# Patient Record
Sex: FEMALE | Race: WHITE | Employment: OTHER
[De-identification: names, ages, dates, MRNs, and addresses within clinical notes are randomized per-mention and may not be internally consistent; named-entity substitution may affect disease eponyms.]

---

## 2017-09-05 PROBLEM — G89.4 CHRONIC PAIN SYNDROME: Status: ACTIVE | Noted: 2017-09-05

## 2017-09-11 PROBLEM — M71.9 BURSITIS: Chronic | Status: ACTIVE | Noted: 2017-09-11

## 2017-09-25 ENCOUNTER — TELEPHONE (OUTPATIENT)
Dept: CASE MANAGEMENT | Age: 57
End: 2017-09-25

## 2017-10-10 PROBLEM — Z15.89 HLA B27 (HLA B27 POSITIVE): Status: ACTIVE | Noted: 2017-10-10

## 2018-03-06 PROBLEM — D72.820 LYMPHOCYTOSIS: Status: ACTIVE | Noted: 2018-03-06

## 2018-03-15 ENCOUNTER — TELEPHONE (OUTPATIENT)
Dept: ENT CLINIC | Age: 58
End: 2018-03-15

## 2018-03-15 NOTE — TELEPHONE ENCOUNTER
Called pt from referral queue to schedule with Dr. Rosalina Jarvis. Patient is a hem onc patient. I left a message for her to call us back to be scheduled. If you can call her to schedule her asap please call her back. Thank you.

## 2018-03-20 ENCOUNTER — TELEPHONE (OUTPATIENT)
Dept: ENT CLINIC | Age: 58
End: 2018-03-20

## 2018-04-03 ENCOUNTER — OFFICE VISIT (OUTPATIENT)
Dept: ENT CLINIC | Age: 58
End: 2018-04-03
Payer: MEDICAID

## 2018-04-03 VITALS
WEIGHT: 154.5 LBS | HEIGHT: 57 IN | HEART RATE: 53 BPM | DIASTOLIC BLOOD PRESSURE: 83 MMHG | SYSTOLIC BLOOD PRESSURE: 141 MMHG | BODY MASS INDEX: 33.33 KG/M2

## 2018-04-03 DIAGNOSIS — K21.9 LPRD (LARYNGOPHARYNGEAL REFLUX DISEASE): ICD-10-CM

## 2018-04-03 DIAGNOSIS — E04.1 THYROID NODULE: Primary | ICD-10-CM

## 2018-04-03 DIAGNOSIS — R49.0 HOARSE: ICD-10-CM

## 2018-04-03 PROCEDURE — G8417 CALC BMI ABV UP PARAM F/U: HCPCS | Performed by: OTOLARYNGOLOGY

## 2018-04-03 PROCEDURE — G8427 DOCREV CUR MEDS BY ELIG CLIN: HCPCS | Performed by: OTOLARYNGOLOGY

## 2018-04-03 PROCEDURE — 4004F PT TOBACCO SCREEN RCVD TLK: CPT | Performed by: OTOLARYNGOLOGY

## 2018-04-03 PROCEDURE — 99204 OFFICE O/P NEW MOD 45 MIN: CPT | Performed by: OTOLARYNGOLOGY

## 2018-04-03 PROCEDURE — 3017F COLORECTAL CA SCREEN DOC REV: CPT | Performed by: OTOLARYNGOLOGY

## 2018-04-03 PROCEDURE — 3014F SCREEN MAMMO DOC REV: CPT | Performed by: OTOLARYNGOLOGY

## 2018-04-03 RX ORDER — AZELASTINE 1 MG/ML
2 SPRAY, METERED NASAL 2 TIMES DAILY
Qty: 1 BOTTLE | Refills: 3 | Status: SHIPPED | OUTPATIENT
Start: 2018-04-03 | End: 2018-05-16 | Stop reason: SDUPTHER

## 2018-04-03 RX ORDER — OMEPRAZOLE 40 MG/1
40 CAPSULE, DELAYED RELEASE ORAL DAILY
Qty: 30 CAPSULE | Refills: 3 | Status: SHIPPED | OUTPATIENT
Start: 2018-04-03 | End: 2018-05-16 | Stop reason: SDUPTHER

## 2018-04-05 ENCOUNTER — TELEPHONE (OUTPATIENT)
Dept: FAMILY MEDICINE CLINIC | Age: 58
End: 2018-04-05

## 2018-04-05 PROBLEM — G47.33 OSA (OBSTRUCTIVE SLEEP APNEA): Status: ACTIVE | Noted: 2018-04-05

## 2018-04-06 ENCOUNTER — TELEPHONE (OUTPATIENT)
Dept: ENT CLINIC | Age: 58
End: 2018-04-06

## 2018-04-06 DIAGNOSIS — E04.1 LEFT THYROID NODULE: Primary | ICD-10-CM

## 2018-04-14 ASSESSMENT — ENCOUNTER SYMPTOMS
BLURRED VISION: 0
SHORTNESS OF BREATH: 0
DOUBLE VISION: 0
COUGH: 0
VOMITING: 0
HEARTBURN: 0

## 2018-04-20 ENCOUNTER — HOSPITAL ENCOUNTER (OUTPATIENT)
Dept: ULTRASOUND IMAGING | Age: 58
Discharge: HOME | End: 2018-04-22
Payer: MEDICAID

## 2018-04-20 DIAGNOSIS — E04.1 THYROID NODULE: ICD-10-CM

## 2018-04-20 DIAGNOSIS — E04.1 LEFT THYROID NODULE: ICD-10-CM

## 2018-04-20 PROCEDURE — 88173 CYTOPATH EVAL FNA REPORT: CPT

## 2018-04-20 PROCEDURE — 76942 ECHO GUIDE FOR BIOPSY: CPT

## 2018-04-20 PROCEDURE — 88305 TISSUE EXAM BY PATHOLOGIST: CPT

## 2018-04-20 PROCEDURE — 10022 US FINE NEEDLE ASPIRATION: CPT

## 2018-05-01 ENCOUNTER — TELEPHONE (OUTPATIENT)
Dept: ADMINISTRATIVE | Age: 58
End: 2018-05-01

## 2018-05-03 DIAGNOSIS — Z12.39 SCREENING FOR BREAST CANCER: Primary | ICD-10-CM

## 2018-05-15 ENCOUNTER — OFFICE VISIT (OUTPATIENT)
Dept: FAMILY MEDICINE CLINIC | Age: 58
End: 2018-05-15
Payer: MEDICAID

## 2018-05-15 ENCOUNTER — HOSPITAL ENCOUNTER (OUTPATIENT)
Age: 58
Discharge: HOME | End: 2018-05-17
Payer: MEDICAID

## 2018-05-15 VITALS
BODY MASS INDEX: 36.29 KG/M2 | TEMPERATURE: 97.9 F | HEIGHT: 59 IN | WEIGHT: 180 LBS | DIASTOLIC BLOOD PRESSURE: 80 MMHG | SYSTOLIC BLOOD PRESSURE: 118 MMHG | OXYGEN SATURATION: 94 % | HEART RATE: 56 BPM

## 2018-05-15 DIAGNOSIS — R63.5 RAPID WEIGHT GAIN: ICD-10-CM

## 2018-05-15 DIAGNOSIS — I10 ESSENTIAL HYPERTENSION: Primary | ICD-10-CM

## 2018-05-15 DIAGNOSIS — R49.0 HOARSENESS: ICD-10-CM

## 2018-05-15 DIAGNOSIS — Z15.89 HLA B27 (HLA B27 POSITIVE): ICD-10-CM

## 2018-05-15 LAB — TSH SERPL DL<=0.05 MIU/L-ACNC: 2.56 UIU/ML (ref 0.27–4.2)

## 2018-05-15 PROCEDURE — 84443 ASSAY THYROID STIM HORMONE: CPT

## 2018-05-15 PROCEDURE — 99214 OFFICE O/P EST MOD 30 MIN: CPT | Performed by: FAMILY MEDICINE

## 2018-05-15 PROCEDURE — 36415 COLL VENOUS BLD VENIPUNCTURE: CPT | Performed by: FAMILY MEDICINE

## 2018-05-15 PROCEDURE — 3017F COLORECTAL CA SCREEN DOC REV: CPT | Performed by: FAMILY MEDICINE

## 2018-05-15 PROCEDURE — G8427 DOCREV CUR MEDS BY ELIG CLIN: HCPCS | Performed by: FAMILY MEDICINE

## 2018-05-15 PROCEDURE — 4004F PT TOBACCO SCREEN RCVD TLK: CPT | Performed by: FAMILY MEDICINE

## 2018-05-15 PROCEDURE — G8417 CALC BMI ABV UP PARAM F/U: HCPCS | Performed by: FAMILY MEDICINE

## 2018-05-15 RX ORDER — CHLORTHALIDONE 25 MG/1
25 TABLET ORAL DAILY
Qty: 30 TABLET | Refills: 2 | Status: SHIPPED | OUTPATIENT
Start: 2018-05-15 | End: 2018-07-24 | Stop reason: SDUPTHER

## 2018-05-15 RX ORDER — MIRTAZAPINE 30 MG/1
30 TABLET, FILM COATED ORAL NIGHTLY
COMMUNITY
Start: 2018-05-15 | End: 2018-11-01 | Stop reason: SDUPTHER

## 2018-05-15 RX ORDER — LOSARTAN POTASSIUM 50 MG/1
50 TABLET ORAL DAILY
Qty: 30 TABLET | Refills: 2 | Status: SHIPPED | OUTPATIENT
Start: 2018-05-15 | End: 2018-07-15 | Stop reason: SDUPTHER

## 2018-05-16 ENCOUNTER — OFFICE VISIT (OUTPATIENT)
Dept: ENT CLINIC | Age: 58
End: 2018-05-16
Payer: MEDICAID

## 2018-05-16 VITALS
HEIGHT: 57 IN | DIASTOLIC BLOOD PRESSURE: 78 MMHG | SYSTOLIC BLOOD PRESSURE: 139 MMHG | WEIGHT: 182.2 LBS | BODY MASS INDEX: 39.31 KG/M2 | HEART RATE: 55 BPM

## 2018-05-16 DIAGNOSIS — E04.1 LEFT THYROID NODULE: Primary | ICD-10-CM

## 2018-05-16 DIAGNOSIS — J38.1 REINKE'S EDEMA OF VOCAL FOLDS: ICD-10-CM

## 2018-05-16 DIAGNOSIS — R49.0 HOARSE: ICD-10-CM

## 2018-05-16 PROCEDURE — 3017F COLORECTAL CA SCREEN DOC REV: CPT | Performed by: OTOLARYNGOLOGY

## 2018-05-16 PROCEDURE — 4004F PT TOBACCO SCREEN RCVD TLK: CPT | Performed by: OTOLARYNGOLOGY

## 2018-05-16 PROCEDURE — G8417 CALC BMI ABV UP PARAM F/U: HCPCS | Performed by: OTOLARYNGOLOGY

## 2018-05-16 PROCEDURE — G8427 DOCREV CUR MEDS BY ELIG CLIN: HCPCS | Performed by: OTOLARYNGOLOGY

## 2018-05-16 PROCEDURE — 31575 DIAGNOSTIC LARYNGOSCOPY: CPT | Performed by: OTOLARYNGOLOGY

## 2018-05-16 PROCEDURE — 99214 OFFICE O/P EST MOD 30 MIN: CPT | Performed by: OTOLARYNGOLOGY

## 2018-05-16 RX ORDER — AZELASTINE 1 MG/ML
2 SPRAY, METERED NASAL 2 TIMES DAILY
Qty: 1 BOTTLE | Refills: 3 | Status: SHIPPED | OUTPATIENT
Start: 2018-05-16 | End: 2018-08-30 | Stop reason: SDUPTHER

## 2018-05-16 RX ORDER — OMEPRAZOLE 40 MG/1
40 CAPSULE, DELAYED RELEASE ORAL DAILY
Qty: 30 CAPSULE | Refills: 3 | Status: SHIPPED | OUTPATIENT
Start: 2018-05-16 | End: 2018-08-30 | Stop reason: SDUPTHER

## 2018-05-16 ASSESSMENT — ENCOUNTER SYMPTOMS
EYES NEGATIVE: 1
ALLERGIC/IMMUNOLOGIC NEGATIVE: 1
GASTROINTESTINAL NEGATIVE: 1
SORE THROAT: 1
TROUBLE SWALLOWING: 0
RESPIRATORY NEGATIVE: 1
VOICE CHANGE: 1

## 2018-06-05 LAB
ALBUMIN SERPL-MCNC: NORMAL G/DL
ALP BLD-CCNC: NORMAL U/L
ALT SERPL-CCNC: NORMAL U/L
ANION GAP SERPL CALCULATED.3IONS-SCNC: NORMAL MMOL/L
AST SERPL-CCNC: NORMAL U/L
BILIRUB SERPL-MCNC: NORMAL MG/DL (ref 0.1–1.4)
BUN BLDV-MCNC: 12 MG/DL
CALCIUM SERPL-MCNC: 9.6 MG/DL
CHLORIDE BLD-SCNC: 97 MMOL/L
CO2: 31 MMOL/L
CREAT SERPL-MCNC: 0.84 MG/DL
GFR CALCULATED: >60
GLUCOSE BLD-MCNC: 86 MG/DL
POTASSIUM SERPL-SCNC: 4.2 MMOL/L
SODIUM BLD-SCNC: 137 MMOL/L
TOTAL PROTEIN: NORMAL

## 2018-07-15 DIAGNOSIS — I10 ESSENTIAL HYPERTENSION: ICD-10-CM

## 2018-07-17 RX ORDER — LOSARTAN POTASSIUM 50 MG/1
50 TABLET ORAL DAILY
Qty: 30 TABLET | Refills: 0 | Status: SHIPPED | OUTPATIENT
Start: 2018-07-17 | End: 2018-08-16

## 2018-07-23 DIAGNOSIS — I10 ESSENTIAL HYPERTENSION: ICD-10-CM

## 2018-07-23 NOTE — TELEPHONE ENCOUNTER
Requested Prescriptions     Pending Prescriptions Disp Refills    metoprolol succinate (TOPROL XL) 100 MG extended release tablet 30 tablet 2     Sig: TAKE 1 TABLET BY MOUTH DAILY       Next appt is Visit date not found  Last appt was 5/15/2018

## 2018-07-24 DIAGNOSIS — K21.9 GASTROESOPHAGEAL REFLUX DISEASE, ESOPHAGITIS PRESENCE NOT SPECIFIED: ICD-10-CM

## 2018-07-24 DIAGNOSIS — R63.5 RAPID WEIGHT GAIN: ICD-10-CM

## 2018-07-24 DIAGNOSIS — I10 ESSENTIAL HYPERTENSION: ICD-10-CM

## 2018-07-24 RX ORDER — RANITIDINE 150 MG/1
TABLET ORAL
Qty: 60 TABLET | Refills: 2 | Status: SHIPPED | OUTPATIENT
Start: 2018-07-24 | End: 2018-08-30 | Stop reason: SDUPTHER

## 2018-07-24 RX ORDER — METOPROLOL SUCCINATE 100 MG/1
TABLET, EXTENDED RELEASE ORAL
Qty: 30 TABLET | Refills: 2 | Status: SHIPPED | OUTPATIENT
Start: 2018-07-24 | End: 2018-08-30 | Stop reason: SDUPTHER

## 2018-07-24 RX ORDER — CHLORTHALIDONE 25 MG/1
25 TABLET ORAL DAILY
Qty: 30 TABLET | Refills: 2 | Status: SHIPPED | OUTPATIENT
Start: 2018-07-24 | End: 2018-11-01 | Stop reason: SDUPTHER

## 2018-08-14 DIAGNOSIS — I10 ESSENTIAL HYPERTENSION: ICD-10-CM

## 2018-08-16 RX ORDER — LOSARTAN POTASSIUM 50 MG/1
50 TABLET ORAL DAILY
Qty: 30 TABLET | Refills: 0 | Status: SHIPPED | OUTPATIENT
Start: 2018-08-16 | End: 2018-08-30 | Stop reason: SDUPTHER

## 2018-08-30 ENCOUNTER — OFFICE VISIT (OUTPATIENT)
Dept: FAMILY MEDICINE CLINIC | Age: 58
End: 2018-08-30
Payer: MEDICAID

## 2018-08-30 ENCOUNTER — HOSPITAL ENCOUNTER (OUTPATIENT)
Age: 58
Discharge: HOME | End: 2018-09-01
Payer: MEDICAID

## 2018-08-30 VITALS
HEART RATE: 80 BPM | SYSTOLIC BLOOD PRESSURE: 128 MMHG | HEIGHT: 59 IN | TEMPERATURE: 98.4 F | BODY MASS INDEX: 36.49 KG/M2 | DIASTOLIC BLOOD PRESSURE: 80 MMHG | WEIGHT: 181 LBS | OXYGEN SATURATION: 94 %

## 2018-08-30 DIAGNOSIS — R23.2 VASOMOTOR FLUSHING: ICD-10-CM

## 2018-08-30 DIAGNOSIS — R23.2 VASOMOTOR FLUSHING: Primary | ICD-10-CM

## 2018-08-30 DIAGNOSIS — R30.0 DYSURIA: ICD-10-CM

## 2018-08-30 DIAGNOSIS — K21.9 GASTROESOPHAGEAL REFLUX DISEASE, ESOPHAGITIS PRESENCE NOT SPECIFIED: ICD-10-CM

## 2018-08-30 DIAGNOSIS — Z12.39 SCREENING FOR BREAST CANCER: ICD-10-CM

## 2018-08-30 DIAGNOSIS — I10 ESSENTIAL HYPERTENSION: ICD-10-CM

## 2018-08-30 LAB
ALBUMIN SERPL-MCNC: 4 G/DL (ref 3.5–5.2)
ALP BLD-CCNC: 79 U/L (ref 35–104)
ALT SERPL-CCNC: 18 U/L (ref 0–32)
ANION GAP SERPL CALCULATED.3IONS-SCNC: 17 MMOL/L (ref 7–16)
AST SERPL-CCNC: 24 U/L (ref 0–31)
BASOPHILS ABSOLUTE: 0.09 E9/L (ref 0–0.2)
BASOPHILS RELATIVE PERCENT: 0.9 % (ref 0–2)
BILIRUB SERPL-MCNC: 0.3 MG/DL (ref 0–1.2)
BILIRUBIN URINE: NEGATIVE
BILIRUBIN, POC: NORMAL
BLOOD URINE, POC: NORMAL
BLOOD, URINE: NEGATIVE
BUN BLDV-MCNC: 19 MG/DL (ref 6–20)
CALCIUM SERPL-MCNC: 9.2 MG/DL (ref 8.6–10.2)
CHLORIDE BLD-SCNC: 98 MMOL/L (ref 98–107)
CLARITY, POC: CLEAR
CLARITY: CLEAR
CO2: 26 MMOL/L (ref 22–29)
COLOR, POC: YELLOW
COLOR: YELLOW
CREAT SERPL-MCNC: 0.9 MG/DL (ref 0.5–1)
EOSINOPHILS ABSOLUTE: 0.31 E9/L (ref 0.05–0.5)
EOSINOPHILS RELATIVE PERCENT: 3 % (ref 0–6)
FOLLICLE STIMULATING HORMONE: 82.8 MIU/ML
GFR AFRICAN AMERICAN: >60
GFR NON-AFRICAN AMERICAN: >60 ML/MIN/1.73
GLUCOSE BLD-MCNC: 96 MG/DL (ref 74–109)
GLUCOSE URINE, POC: NORMAL
GLUCOSE URINE: NEGATIVE MG/DL
HCT VFR BLD CALC: 48.8 % (ref 34–48)
HEMOGLOBIN: 15.7 G/DL (ref 11.5–15.5)
IMMATURE GRANULOCYTES #: 0.04 E9/L
IMMATURE GRANULOCYTES %: 0.4 % (ref 0–5)
KETONES, POC: NORMAL
KETONES, URINE: NEGATIVE MG/DL
LEUKOCYTE EST, POC: NORMAL
LEUKOCYTE ESTERASE, URINE: NEGATIVE
LUTEINIZING HORMONE: 36.6 MIU/ML
LYMPHOCYTES ABSOLUTE: 2.91 E9/L (ref 1.5–4)
LYMPHOCYTES RELATIVE PERCENT: 27.7 % (ref 20–42)
MCH RBC QN AUTO: 30.5 PG (ref 26–35)
MCHC RBC AUTO-ENTMCNC: 32.2 % (ref 32–34.5)
MCV RBC AUTO: 94.8 FL (ref 80–99.9)
MONOCYTES ABSOLUTE: 1.04 E9/L (ref 0.1–0.95)
MONOCYTES RELATIVE PERCENT: 9.9 % (ref 2–12)
NEUTROPHILS ABSOLUTE: 6.11 E9/L (ref 1.8–7.3)
NEUTROPHILS RELATIVE PERCENT: 58.1 % (ref 43–80)
NITRITE, POC: NORMAL
NITRITE, URINE: NEGATIVE
PDW BLD-RTO: 13.9 FL (ref 11.5–15)
PH UA: 5 (ref 5–9)
PH, POC: 5
PLATELET # BLD: 290 E9/L (ref 130–450)
PMV BLD AUTO: 11.6 FL (ref 7–12)
POTASSIUM SERPL-SCNC: 4 MMOL/L (ref 3.5–5)
PROTEIN UA: NEGATIVE MG/DL
PROTEIN, POC: NORMAL
RBC # BLD: 5.15 E12/L (ref 3.5–5.5)
SODIUM BLD-SCNC: 141 MMOL/L (ref 132–146)
SPECIFIC GRAVITY UA: >=1.03 (ref 1–1.03)
SPECIFIC GRAVITY, POC: 1.03
T4 FREE: 0.96 NG/DL (ref 0.93–1.7)
TOTAL PROTEIN: 7.1 G/DL (ref 6.4–8.3)
TSH SERPL DL<=0.05 MIU/L-ACNC: 3.32 UIU/ML (ref 0.27–4.2)
UROBILINOGEN, POC: 0.2
UROBILINOGEN, URINE: 0.2 E.U./DL
WBC # BLD: 10.5 E9/L (ref 4.5–11.5)

## 2018-08-30 PROCEDURE — 3017F COLORECTAL CA SCREEN DOC REV: CPT | Performed by: FAMILY MEDICINE

## 2018-08-30 PROCEDURE — 81002 URINALYSIS NONAUTO W/O SCOPE: CPT | Performed by: FAMILY MEDICINE

## 2018-08-30 PROCEDURE — 87088 URINE BACTERIA CULTURE: CPT

## 2018-08-30 PROCEDURE — 84443 ASSAY THYROID STIM HORMONE: CPT

## 2018-08-30 PROCEDURE — 4004F PT TOBACCO SCREEN RCVD TLK: CPT | Performed by: FAMILY MEDICINE

## 2018-08-30 PROCEDURE — 81003 URINALYSIS AUTO W/O SCOPE: CPT

## 2018-08-30 PROCEDURE — 85025 COMPLETE CBC W/AUTO DIFF WBC: CPT

## 2018-08-30 PROCEDURE — 83001 ASSAY OF GONADOTROPIN (FSH): CPT

## 2018-08-30 PROCEDURE — 36415 COLL VENOUS BLD VENIPUNCTURE: CPT | Performed by: FAMILY MEDICINE

## 2018-08-30 PROCEDURE — G8417 CALC BMI ABV UP PARAM F/U: HCPCS | Performed by: FAMILY MEDICINE

## 2018-08-30 PROCEDURE — 80053 COMPREHEN METABOLIC PANEL: CPT

## 2018-08-30 PROCEDURE — 99214 OFFICE O/P EST MOD 30 MIN: CPT | Performed by: FAMILY MEDICINE

## 2018-08-30 PROCEDURE — G8428 CUR MEDS NOT DOCUMENT: HCPCS | Performed by: FAMILY MEDICINE

## 2018-08-30 PROCEDURE — 84439 ASSAY OF FREE THYROXINE: CPT

## 2018-08-30 PROCEDURE — 83002 ASSAY OF GONADOTROPIN (LH): CPT

## 2018-08-30 RX ORDER — OMEPRAZOLE 20 MG/1
20 CAPSULE, DELAYED RELEASE ORAL DAILY PRN
Qty: 30 CAPSULE | Refills: 2 | Status: SHIPPED | OUTPATIENT
Start: 2018-08-30 | End: 2018-11-01 | Stop reason: SDUPTHER

## 2018-08-30 RX ORDER — MELOXICAM 15 MG/1
TABLET ORAL
Qty: 30 TABLET | Refills: 3 | Status: SHIPPED | OUTPATIENT
Start: 2018-08-30 | End: 2018-11-01

## 2018-08-30 RX ORDER — VARENICLINE TARTRATE 1 MG/1
1 TABLET, FILM COATED ORAL 2 TIMES DAILY
Qty: 60 TABLET | Refills: 2 | Status: SHIPPED | OUTPATIENT
Start: 2018-08-30 | End: 2019-05-30 | Stop reason: SDUPTHER

## 2018-08-30 RX ORDER — METOPROLOL SUCCINATE 100 MG/1
TABLET, EXTENDED RELEASE ORAL
Qty: 30 TABLET | Refills: 2 | Status: SHIPPED | OUTPATIENT
Start: 2018-08-30 | End: 2018-11-01 | Stop reason: SDUPTHER

## 2018-08-30 RX ORDER — AZELASTINE 1 MG/ML
2 SPRAY, METERED NASAL 2 TIMES DAILY
Qty: 1 BOTTLE | Refills: 3 | Status: SHIPPED | OUTPATIENT
Start: 2018-08-30 | End: 2019-07-02 | Stop reason: SDUPTHER

## 2018-08-30 RX ORDER — CLONIDINE HYDROCHLORIDE 0.1 MG/1
0.1 TABLET ORAL 3 TIMES DAILY
Qty: 90 TABLET | Refills: 2 | Status: ON HOLD | OUTPATIENT
Start: 2018-08-30 | End: 2018-09-25 | Stop reason: HOSPADM

## 2018-08-30 RX ORDER — FLUTICASONE PROPIONATE 50 MCG
2 SPRAY, SUSPENSION (ML) NASAL DAILY
Qty: 1 BOTTLE | Refills: 3 | Status: SHIPPED | OUTPATIENT
Start: 2018-08-30 | End: 2018-09-22 | Stop reason: ALTCHOICE

## 2018-08-30 RX ORDER — LOSARTAN POTASSIUM 50 MG/1
50 TABLET ORAL DAILY
Qty: 30 TABLET | Refills: 0 | Status: SHIPPED | OUTPATIENT
Start: 2018-08-30 | End: 2018-10-09 | Stop reason: SDUPTHER

## 2018-08-30 RX ORDER — ACETAMINOPHEN 160 MG
2000 TABLET,DISINTEGRATING ORAL DAILY
Qty: 30 CAPSULE | Refills: 5 | Status: SHIPPED | OUTPATIENT
Start: 2018-08-30 | End: 2018-11-01 | Stop reason: SDUPTHER

## 2018-08-30 RX ORDER — RANITIDINE 150 MG/1
TABLET ORAL
Qty: 60 TABLET | Refills: 2 | Status: ON HOLD | OUTPATIENT
Start: 2018-08-30 | End: 2018-09-25 | Stop reason: HOSPADM

## 2018-08-30 ASSESSMENT — PATIENT HEALTH QUESTIONNAIRE - PHQ9
1. LITTLE INTEREST OR PLEASURE IN DOING THINGS: 1
SUM OF ALL RESPONSES TO PHQ QUESTIONS 1-9: 1
SUM OF ALL RESPONSES TO PHQ9 QUESTIONS 1 & 2: 1
SUM OF ALL RESPONSES TO PHQ QUESTIONS 1-9: 1
2. FEELING DOWN, DEPRESSED OR HOPELESS: 0

## 2018-08-30 ASSESSMENT — ENCOUNTER SYMPTOMS
WHEEZING: 0
COUGH: 0

## 2018-08-30 NOTE — PROGRESS NOTES
NERVE BLOCK Right 09/11/2017    hip #2    OTHER SURGICAL HISTORY Right 2 9 15    lumbar radiofrequency    OTHER SURGICAL HISTORY Right 3/9/16    lumbar radiofrequency    OTHER SURGICAL HISTORY Right 11/14/2016    right hip injection #1    OTHER SURGICAL HISTORY Left 12/19/2016    genicular radiofrequency    ROTATOR CUFF REPAIR  1999 at 42 years    Dr Akbar Abebe Left 7/22/14 at 48years old    Dr. Loraine Boggs  8/20/12       Past Family Hx:  Reviewed with patient  Family History   Problem Relation Age of Onset    Arthritis Mother     High Blood Pressure Mother     Rheum Arthritis Mother     High Blood Pressure Father     Other Father         emphysema    High Blood Pressure Brother     Depression Daughter     High Blood Pressure Brother        Social Hx:  Reviewed with patient  Social History   Substance Use Topics    Smoking status: Current Every Day Smoker     Packs/day: 1.50     Years: 40.00     Types: Cigarettes    Smokeless tobacco: Never Used      Comment: started at age 21-quit three times previously    Alcohol use No       Immunization History   Administered Date(s) Administered    Influenza Virus Vaccine 10/28/2013, 11/11/2015    Influenza, High Dose (Fluzone 65 yrs and older) 10/18/2017    Influenza, Gab Marte, 3 Years and older, IM (Fluzone 3 yrs and older or Afluria 5 yrs and older) 11/04/2016    Influenza, Gab Marte, 3 yrs and older, IM, PF (Fluzone 3 yrs and older or Afluria 5 yrs and older) 10/03/2017    Pneumococcal 13-valent Conjugate ([id number]) 01/26/2017    Tdap (Boostrix, Adacel) 07/20/2017       Review of Systems  Review of Systems   Constitutional: Positive for diaphoresis and malaise/fatigue. Negative for chills and fever. Respiratory: Negative for cough and wheezing. Cardiovascular: Negative for chest pain and leg swelling.    Musculoskeletal: - rto if ua/ urine culture normal  -     POCT Urinalysis no Micro  -     Urinalysis; Future  -     Urine Culture; Future    Screening for breast cancer  -     ALFONSO DIGITAL SCREEN W CAD BILATERAL; Future    refills  -     azelastine (ASTELIN) 0.1 % nasal spray; 2 sprays by Nasal route 2 times daily Use in each nostril as directed  -     fluticasone (FLONASE) 50 MCG/ACT nasal spray; 2 sprays by Nasal route daily  -     omeprazole (PRILOSEC) 20 MG delayed release capsule; Take 1 capsule by mouth daily as needed (heartburn)  -     varenicline (CHANTIX CONTINUING MONTH MARICEL) 1 MG tablet; Take 1 tablet by mouth 2 times daily  -     Cholecalciferol (VITAMIN D3) 2000 units CAPS; Take 1 capsule by mouth daily    Spent 25 minutes in direct patient care with patient of which greater than 50% was spent counseling and/or coordinating care regarding the above issues/ diagnoses      Return in about 1 month (around 9/30/2018). Advised patient to call with any new medication issues. All questions answered.   Call or go to ED immediately if symptoms worsen or persist.

## 2018-09-01 LAB — URINE CULTURE, ROUTINE: NORMAL

## 2018-09-22 ENCOUNTER — APPOINTMENT (OUTPATIENT)
Dept: GENERAL RADIOLOGY | Age: 58
DRG: 190 | End: 2018-09-22
Payer: MEDICAID

## 2018-09-22 ENCOUNTER — HOSPITAL ENCOUNTER (INPATIENT)
Age: 58
LOS: 2 days | Discharge: HOME | DRG: 190 | End: 2018-09-25
Attending: EMERGENCY MEDICINE | Admitting: INTERNAL MEDICINE
Payer: MEDICAID

## 2018-09-22 DIAGNOSIS — R07.89 ATYPICAL CHEST PAIN: Primary | ICD-10-CM

## 2018-09-22 PROBLEM — E78.2 MIXED HYPERLIPIDEMIA: Chronic | Status: ACTIVE | Noted: 2018-09-22

## 2018-09-22 PROBLEM — M71.9 BURSITIS: Chronic | Status: RESOLVED | Noted: 2017-09-11 | Resolved: 2018-09-22

## 2018-09-22 PROBLEM — I31.9 PERICARDITIS: Status: ACTIVE | Noted: 2018-09-22

## 2018-09-22 PROBLEM — G89.4 CHRONIC PAIN SYNDROME: Chronic | Status: ACTIVE | Noted: 2017-09-05

## 2018-09-22 PROBLEM — G47.33 OSA (OBSTRUCTIVE SLEEP APNEA): Chronic | Status: ACTIVE | Noted: 2018-04-05

## 2018-09-22 LAB
ALBUMIN SERPL-MCNC: 4.3 G/DL (ref 3.5–5.2)
ALP BLD-CCNC: 81 U/L (ref 35–104)
ALT SERPL-CCNC: 18 U/L (ref 0–32)
ANION GAP SERPL CALCULATED.3IONS-SCNC: 11 MMOL/L (ref 7–16)
AST SERPL-CCNC: 18 U/L (ref 0–31)
BASOPHILS ABSOLUTE: 0.09 E9/L (ref 0–0.2)
BASOPHILS RELATIVE PERCENT: 0.8 % (ref 0–2)
BILIRUB SERPL-MCNC: 0.2 MG/DL (ref 0–1.2)
BUN BLDV-MCNC: 20 MG/DL (ref 6–20)
C-REACTIVE PROTEIN: 0.4 MG/DL (ref 0–0.4)
CALCIUM SERPL-MCNC: 9.7 MG/DL (ref 8.6–10.2)
CHLORIDE BLD-SCNC: 100 MMOL/L (ref 98–107)
CO2: 29 MMOL/L (ref 22–29)
CREAT SERPL-MCNC: 0.8 MG/DL (ref 0.5–1)
EOSINOPHILS ABSOLUTE: 0.11 E9/L (ref 0.05–0.5)
EOSINOPHILS RELATIVE PERCENT: 0.9 % (ref 0–6)
FILM ARRAY ADENOVIRUS: NORMAL
FILM ARRAY BORDETELLA PERTUSSIS: NORMAL
FILM ARRAY CHLAMYDOPHILIA PNEUMONIAE: NORMAL
FILM ARRAY CORONAVIRUS 229E: NORMAL
FILM ARRAY CORONAVIRUS HKU1: NORMAL
FILM ARRAY CORONAVIRUS NL63: NORMAL
FILM ARRAY CORONAVIRUS OC43: NORMAL
FILM ARRAY INFLUENZA A VIRUS 09H1: NORMAL
FILM ARRAY INFLUENZA A VIRUS H1: NORMAL
FILM ARRAY INFLUENZA A VIRUS H3: NORMAL
FILM ARRAY INFLUENZA A VIRUS: NORMAL
FILM ARRAY INFLUENZA B: NORMAL
FILM ARRAY METAPNEUMOVIRUS: NORMAL
FILM ARRAY MYCOPLASMA PNEUMONIAE: NORMAL
FILM ARRAY PARAINFLUENZA VIRUS 1: NORMAL
FILM ARRAY PARAINFLUENZA VIRUS 2: NORMAL
FILM ARRAY PARAINFLUENZA VIRUS 3: NORMAL
FILM ARRAY PARAINFLUENZA VIRUS 4: NORMAL
FILM ARRAY RESPIRATORY SYNCITIAL VIRUS: NORMAL
FILM ARRAY RHINOVIRUS/ENTEROVIRUS: NORMAL
GFR AFRICAN AMERICAN: >60
GFR NON-AFRICAN AMERICAN: >60 ML/MIN/1.73
GLUCOSE BLD-MCNC: 128 MG/DL (ref 74–109)
HCT VFR BLD CALC: 45.3 % (ref 34–48)
HEMOGLOBIN: 14.8 G/DL (ref 11.5–15.5)
IMMATURE GRANULOCYTES #: 0.06 E9/L
IMMATURE GRANULOCYTES %: 0.5 % (ref 0–5)
INFLUENZA A BY PCR: NOT DETECTED
INFLUENZA B BY PCR: NOT DETECTED
LACTIC ACID: 1.6 MMOL/L (ref 0.5–2.2)
LYMPHOCYTES ABSOLUTE: 1.99 E9/L (ref 1.5–4)
LYMPHOCYTES RELATIVE PERCENT: 17.2 % (ref 20–42)
MCH RBC QN AUTO: 29.9 PG (ref 26–35)
MCHC RBC AUTO-ENTMCNC: 32.7 % (ref 32–34.5)
MCV RBC AUTO: 91.5 FL (ref 80–99.9)
MONOCYTES ABSOLUTE: 0.8 E9/L (ref 0.1–0.95)
MONOCYTES RELATIVE PERCENT: 6.9 % (ref 2–12)
NEUTROPHILS ABSOLUTE: 8.54 E9/L (ref 1.8–7.3)
NEUTROPHILS RELATIVE PERCENT: 73.7 % (ref 43–80)
PDW BLD-RTO: 13.5 FL (ref 11.5–15)
PLATELET # BLD: 304 E9/L (ref 130–450)
PMV BLD AUTO: 11.1 FL (ref 7–12)
POTASSIUM SERPL-SCNC: 4.2 MMOL/L (ref 3.5–5)
PRO-BNP: 423 PG/ML (ref 0–125)
RBC # BLD: 4.95 E12/L (ref 3.5–5.5)
SODIUM BLD-SCNC: 140 MMOL/L (ref 132–146)
TOTAL PROTEIN: 7.5 G/DL (ref 6.4–8.3)
TROPONIN: 0.03 NG/ML (ref 0–0.03)
TROPONIN: 0.41 NG/ML (ref 0–0.03)
TROPONIN: 0.6 NG/ML (ref 0–0.03)
WBC # BLD: 11.6 E9/L (ref 4.5–11.5)

## 2018-09-22 PROCEDURE — 6370000000 HC RX 637 (ALT 250 FOR IP): Performed by: INTERNAL MEDICINE

## 2018-09-22 PROCEDURE — 99285 EMERGENCY DEPT VISIT HI MDM: CPT

## 2018-09-22 PROCEDURE — 80053 COMPREHEN METABOLIC PANEL: CPT

## 2018-09-22 PROCEDURE — 87502 INFLUENZA DNA AMP PROBE: CPT

## 2018-09-22 PROCEDURE — 87040 BLOOD CULTURE FOR BACTERIA: CPT

## 2018-09-22 PROCEDURE — 87486 CHLMYD PNEUM DNA AMP PROBE: CPT

## 2018-09-22 PROCEDURE — 84484 ASSAY OF TROPONIN QUANT: CPT

## 2018-09-22 PROCEDURE — 96374 THER/PROPH/DIAG INJ IV PUSH: CPT

## 2018-09-22 PROCEDURE — G0378 HOSPITAL OBSERVATION PER HR: HCPCS

## 2018-09-22 PROCEDURE — 93005 ELECTROCARDIOGRAM TRACING: CPT | Performed by: INTERNAL MEDICINE

## 2018-09-22 PROCEDURE — 83880 ASSAY OF NATRIURETIC PEPTIDE: CPT

## 2018-09-22 PROCEDURE — 94760 N-INVAS EAR/PLS OXIMETRY 1: CPT

## 2018-09-22 PROCEDURE — 85025 COMPLETE CBC W/AUTO DIFF WBC: CPT

## 2018-09-22 PROCEDURE — 87581 M.PNEUMON DNA AMP PROBE: CPT

## 2018-09-22 PROCEDURE — 87798 DETECT AGENT NOS DNA AMP: CPT

## 2018-09-22 PROCEDURE — 6360000002 HC RX W HCPCS: Performed by: EMERGENCY MEDICINE

## 2018-09-22 PROCEDURE — 86140 C-REACTIVE PROTEIN: CPT

## 2018-09-22 PROCEDURE — 71045 X-RAY EXAM CHEST 1 VIEW: CPT

## 2018-09-22 PROCEDURE — 36415 COLL VENOUS BLD VENIPUNCTURE: CPT

## 2018-09-22 PROCEDURE — 85651 RBC SED RATE NONAUTOMATED: CPT

## 2018-09-22 PROCEDURE — 96375 TX/PRO/DX INJ NEW DRUG ADDON: CPT

## 2018-09-22 PROCEDURE — 87633 RESP VIRUS 12-25 TARGETS: CPT

## 2018-09-22 PROCEDURE — 83605 ASSAY OF LACTIC ACID: CPT

## 2018-09-22 PROCEDURE — 99255 IP/OBS CONSLTJ NEW/EST HI 80: CPT | Performed by: INTERNAL MEDICINE

## 2018-09-22 PROCEDURE — 2580000003 HC RX 258: Performed by: EMERGENCY MEDICINE

## 2018-09-22 PROCEDURE — 2580000003 HC RX 258: Performed by: INTERNAL MEDICINE

## 2018-09-22 RX ORDER — AZELASTINE 1 MG/ML
2 SPRAY, METERED NASAL 2 TIMES DAILY
Status: DISCONTINUED | OUTPATIENT
Start: 2018-09-22 | End: 2018-09-22 | Stop reason: CLARIF

## 2018-09-22 RX ORDER — IPRATROPIUM BROMIDE AND ALBUTEROL SULFATE 2.5; .5 MG/3ML; MG/3ML
1 SOLUTION RESPIRATORY (INHALATION) EVERY 4 HOURS PRN
Status: DISCONTINUED | OUTPATIENT
Start: 2018-09-22 | End: 2018-09-25 | Stop reason: HOSPADM

## 2018-09-22 RX ORDER — HYDROCODONE BITARTRATE AND ACETAMINOPHEN 7.5; 325 MG/1; MG/1
1 TABLET ORAL EVERY 6 HOURS PRN
Status: DISCONTINUED | OUTPATIENT
Start: 2018-09-22 | End: 2018-09-23

## 2018-09-22 RX ORDER — SODIUM CHLORIDE 0.9 % (FLUSH) 0.9 %
10 SYRINGE (ML) INJECTION PRN
Status: DISCONTINUED | OUTPATIENT
Start: 2018-09-22 | End: 2018-09-25 | Stop reason: HOSPADM

## 2018-09-22 RX ORDER — HYDRALAZINE HYDROCHLORIDE 20 MG/ML
10 INJECTION INTRAMUSCULAR; INTRAVENOUS EVERY 6 HOURS PRN
Status: DISCONTINUED | OUTPATIENT
Start: 2018-09-22 | End: 2018-09-23

## 2018-09-22 RX ORDER — PANTOPRAZOLE SODIUM 40 MG/1
40 TABLET, DELAYED RELEASE ORAL
Status: DISCONTINUED | OUTPATIENT
Start: 2018-09-23 | End: 2018-09-25 | Stop reason: HOSPADM

## 2018-09-22 RX ORDER — METOPROLOL SUCCINATE 100 MG/1
100 TABLET, EXTENDED RELEASE ORAL DAILY
Status: DISCONTINUED | OUTPATIENT
Start: 2018-09-23 | End: 2018-09-25 | Stop reason: HOSPADM

## 2018-09-22 RX ORDER — PREGABALIN 100 MG/1
100 CAPSULE ORAL 2 TIMES DAILY
Status: DISCONTINUED | OUTPATIENT
Start: 2018-09-22 | End: 2018-09-25 | Stop reason: HOSPADM

## 2018-09-22 RX ORDER — NITROGLYCERIN 0.4 MG/1
0.4 TABLET SUBLINGUAL EVERY 5 MIN PRN
Status: DISCONTINUED | OUTPATIENT
Start: 2018-09-22 | End: 2018-09-25 | Stop reason: HOSPADM

## 2018-09-22 RX ORDER — CLONIDINE HYDROCHLORIDE 0.1 MG/1
0.1 TABLET ORAL 3 TIMES DAILY
Status: DISCONTINUED | OUTPATIENT
Start: 2018-09-22 | End: 2018-09-23

## 2018-09-22 RX ORDER — ASPIRIN 81 MG/1
324 TABLET, CHEWABLE ORAL ONCE
Status: COMPLETED | OUTPATIENT
Start: 2018-09-22 | End: 2018-09-22

## 2018-09-22 RX ORDER — 0.9 % SODIUM CHLORIDE 0.9 %
500 INTRAVENOUS SOLUTION INTRAVENOUS ONCE
Status: COMPLETED | OUTPATIENT
Start: 2018-09-22 | End: 2018-09-22

## 2018-09-22 RX ORDER — ISOSORBIDE MONONITRATE 30 MG/1
60 TABLET, EXTENDED RELEASE ORAL DAILY
Status: DISCONTINUED | OUTPATIENT
Start: 2018-09-22 | End: 2018-09-25 | Stop reason: HOSPADM

## 2018-09-22 RX ORDER — CETIRIZINE HYDROCHLORIDE 10 MG/1
10 TABLET ORAL NIGHTLY
Status: DISCONTINUED | OUTPATIENT
Start: 2018-09-22 | End: 2018-09-25 | Stop reason: HOSPADM

## 2018-09-22 RX ORDER — ACETAMINOPHEN 325 MG/1
650 TABLET ORAL EVERY 4 HOURS PRN
Status: DISCONTINUED | OUTPATIENT
Start: 2018-09-22 | End: 2018-09-25 | Stop reason: HOSPADM

## 2018-09-22 RX ORDER — MELOXICAM 7.5 MG/1
15 TABLET ORAL DAILY
Status: DISCONTINUED | OUTPATIENT
Start: 2018-09-23 | End: 2018-09-25 | Stop reason: HOSPADM

## 2018-09-22 RX ORDER — MORPHINE SULFATE 4 MG/ML
4 INJECTION, SOLUTION INTRAMUSCULAR; INTRAVENOUS ONCE
Status: COMPLETED | OUTPATIENT
Start: 2018-09-22 | End: 2018-09-22

## 2018-09-22 RX ORDER — KETOROLAC TROMETHAMINE 30 MG/ML
15 INJECTION, SOLUTION INTRAMUSCULAR; INTRAVENOUS ONCE
Status: COMPLETED | OUTPATIENT
Start: 2018-09-22 | End: 2018-09-22

## 2018-09-22 RX ORDER — SODIUM CHLORIDE 0.9 % (FLUSH) 0.9 %
10 SYRINGE (ML) INJECTION EVERY 12 HOURS SCHEDULED
Status: DISCONTINUED | OUTPATIENT
Start: 2018-09-22 | End: 2018-09-25 | Stop reason: HOSPADM

## 2018-09-22 RX ORDER — CHLORTHALIDONE 25 MG/1
25 TABLET ORAL DAILY
Status: DISCONTINUED | OUTPATIENT
Start: 2018-09-23 | End: 2018-09-25 | Stop reason: HOSPADM

## 2018-09-22 RX ORDER — AMLODIPINE BESYLATE 10 MG/1
10 TABLET ORAL DAILY
Status: DISCONTINUED | OUTPATIENT
Start: 2018-09-22 | End: 2018-09-25 | Stop reason: HOSPADM

## 2018-09-22 RX ORDER — BUPROPION HYDROCHLORIDE 150 MG/1
150 TABLET, EXTENDED RELEASE ORAL 2 TIMES DAILY
Status: DISCONTINUED | OUTPATIENT
Start: 2018-09-22 | End: 2018-09-25 | Stop reason: HOSPADM

## 2018-09-22 RX ORDER — LOSARTAN POTASSIUM 50 MG/1
50 TABLET ORAL DAILY
Status: DISCONTINUED | OUTPATIENT
Start: 2018-09-23 | End: 2018-09-25 | Stop reason: HOSPADM

## 2018-09-22 RX ORDER — MIRTAZAPINE 15 MG/1
30 TABLET, FILM COATED ORAL NIGHTLY
Status: DISCONTINUED | OUTPATIENT
Start: 2018-09-22 | End: 2018-09-25 | Stop reason: HOSPADM

## 2018-09-22 RX ORDER — ONDANSETRON 2 MG/ML
4 INJECTION INTRAMUSCULAR; INTRAVENOUS EVERY 6 HOURS PRN
Status: DISCONTINUED | OUTPATIENT
Start: 2018-09-22 | End: 2018-09-25 | Stop reason: HOSPADM

## 2018-09-22 RX ORDER — ZOLPIDEM TARTRATE 5 MG/1
10 TABLET ORAL NIGHTLY PRN
Status: DISCONTINUED | OUTPATIENT
Start: 2018-09-22 | End: 2018-09-25 | Stop reason: HOSPADM

## 2018-09-22 RX ADMIN — ISOSORBIDE MONONITRATE 60 MG: 30 TABLET ORAL at 16:33

## 2018-09-22 RX ADMIN — CLONIDINE HYDROCHLORIDE 0.1 MG: 0.1 TABLET ORAL at 21:07

## 2018-09-22 RX ADMIN — HYDROCODONE BITARTRATE AND ACETAMINOPHEN 1 TABLET: 7.5; 325 TABLET ORAL at 21:06

## 2018-09-22 RX ADMIN — KETOROLAC TROMETHAMINE 15 MG: 30 INJECTION, SOLUTION INTRAMUSCULAR at 13:13

## 2018-09-22 RX ADMIN — Medication 10 ML: at 21:07

## 2018-09-22 RX ADMIN — ASPIRIN 81 MG CHEWABLE TABLET 324 MG: 81 TABLET CHEWABLE at 21:41

## 2018-09-22 RX ADMIN — MORPHINE SULFATE 4 MG: 4 INJECTION INTRAVENOUS at 15:12

## 2018-09-22 RX ADMIN — SODIUM CHLORIDE 500 ML: 9 INJECTION, SOLUTION INTRAVENOUS at 13:12

## 2018-09-22 RX ADMIN — MIRTAZAPINE 30 MG: 15 TABLET, FILM COATED ORAL at 21:07

## 2018-09-22 RX ADMIN — PREGABALIN 100 MG: 100 CAPSULE ORAL at 21:06

## 2018-09-22 RX ADMIN — NITROGLYCERIN 0.4 MG: 0.4 TABLET SUBLINGUAL at 21:41

## 2018-09-22 RX ADMIN — AMLODIPINE BESYLATE 10 MG: 10 TABLET ORAL at 16:32

## 2018-09-22 RX ADMIN — BUPROPION HYDROCHLORIDE 150 MG: 150 TABLET, EXTENDED RELEASE ORAL at 21:07

## 2018-09-22 ASSESSMENT — PAIN DESCRIPTION - LOCATION
LOCATION: CHEST

## 2018-09-22 ASSESSMENT — PAIN SCALES - GENERAL
PAINLEVEL_OUTOF10: 7
PAINLEVEL_OUTOF10: 0
PAINLEVEL_OUTOF10: 6
PAINLEVEL_OUTOF10: 5
PAINLEVEL_OUTOF10: 7

## 2018-09-22 ASSESSMENT — PAIN DESCRIPTION - ORIENTATION: ORIENTATION: MID

## 2018-09-22 ASSESSMENT — PAIN DESCRIPTION - PAIN TYPE
TYPE: ACUTE PAIN

## 2018-09-22 NOTE — CONSULTS
Consult received  Patient interviewed and examined  Sudden onset of substernal chest pain described as a squeezing and wringing sensation without radiation, associated with nausea, diaphoresis, finally dry heaving  Ultimately self resolved after 15 minutes then recurred at lower intensity  The initial severity prompted presentation here  Since arrival has had intermittent discomfort at such a low intensity she has not reported it  Analy 0.03  ECGs reviewed  All NSR, but with precordial STEs that have improved with each ECG  Possible component of pericarditis- definitely agree with viral panel. Will send of CRP and ESR. However the intermittent \"squeezing\" and transient STEs make me suspicious for coronary vasospasm. Can also be stuttering typical anginal symptoms with type 1 NSTEMI. Also she is hypertensive  Will start oral nitrates  If second set ANALY rises will start heparin gtt  Needs CCB for possible vasospasm  NPO after MN and admit observation. Will order TTE. +/- MPI (likely this as opposed to [address] [facility]). Olga Cardoza M.D.   Advanced Heart Failure and Pulmonary Hypertension  Mobile [phone]
2:42 PM       Cardiac Testing:    -will request records from Dr. Manuelito Jacobs     ECG: ECGs reviewed. All NSR, but with precordial STEs that have improved with each ECG      Assessment:  Possible component of pericarditis- definitely agree with viral panel. Will send of CRP and ESR. However the intermittent \"squeezing\" and transient STEs make me suspicious for coronary vasospasm. Can also be stuttering typical anginal symptoms with type 1 NSTEMI. Also she is hypertensive  Will start oral nitrates  If second set ANALY rises will start heparin gtt  Needs CCB for possible vasospasm  TTE, admit observation for now      Roland Jim M.D.   Advanced Heart Failure and Pulmonary Hypertension  Mobile [phone]

## 2018-09-22 NOTE — ED PROVIDER NOTES
Department of Emergency Medicine   ED  Provider Note  Admit Date/RoomTime: 9/22/2018 12:27 PM  ED Room: 12/12   Chief Complaint     Chest Pain (arms and chest hurt since 7am, pt states she thinks it is pnuemonia but denies cough. )    History of Present Illness   Source of history provided by:  patient. History/Exam Limitations: none. Gagan Juares is a 66 y.o. old female who has a past medical history of:   Past Medical History:   Diagnosis Date    Anxiety and depression 9/11/2013    Baker's cyst, ruptured     Bunion     Chronic myocarditis ([address] 12/1/2016    Deformity of right foot 11/30/2016    Dr Eulogio Howell    Diverticulosis of sigmoid colon     Duodenal ulcer     Facet arthropathy ([address]     Fibrocystic breast changes     Fracture of metatarsal bone     RIGHT FOOT    Genital warts     GERD (gastroesophageal reflux disease)     Hammertoe     History of blood transfusion     loss of blood during pregnancy    History of cervical dysplasia 12/16/2013    Hyperkalemia     Hyperlipidemia     Hypertension     IGT (impaired glucose tolerance) 11/11/2015    Myocarditis ([address]     Dr. Oxana Estrada    Occipital headache 2/26/2015    Occipital neuralgia 5/6/2015    OLIVIA (obstructive sleep apnea)     CPAP    Osteoarthritis of multiple joints 8/13/2013    Osteomyelitis ([address]     Primary insomnia 11/11/2015    Protruded cervical disc/DDD with neural foraminal stenosis 6/10/2015    Pulmonary nodule 9/27/2016    Sacroiliitis ([address] 6/2/2014    Shingles     Tobacco abuse     Valvular heart disease 11/30/2016    Per cardiac MRI 11/8/16-Mild TR/Mild AI Follows with [facility] Dr Oxana Estrada     Patient presents with URI symptoms which began last night. She states she feels as though she may have pneumonia. She has sinus congestion and productive cough. She also states she has felt feverish. This morning upon waking at approximately 7 AM, she developed chest tightness.  She states that she follows with  [facility] in [place] and has had recurrent myocarditis. She is a smoker and is currently on Chantix. She also was recently diagnosed with ankylosing spondylitis and is on Humira. She denies history of coronary artery disease. .  ROS    Pertinent positives and negatives are stated within HPI, all other systems reviewed and are negative.     Past Surgical History:   Procedure Laterality Date    BUNIONECTOMY  great toe    COLONOSCOPY  8/20/12    Dr. Adams Sow  11/2016    Dr Monahan Client great toe/skin    FOOT [facility]  2001    elective due to abnormal cells    JOINT REPLACEMENT Right     KNEE SURGERY Bilateral     arthroscopy    NERVE BLOCK  07/16/14    right sacroiliac joint #1    NERVE BLOCK Right 11/3/14    SI injection #2    NERVE BLOCK Right 11 19 14    si inj #3    NERVE BLOCK Bilateral 4/22/2015    greater occipital nerve block  #1    NERVE BLOCK Bilateral 5/6/15    greater occipital nerve block #2    NERVE BLOCK Bilateral 05/13/15    greater occipital nerve block #3    NERVE BLOCK Right 6/10/15    cervical transforaminal #1    NERVE BLOCK      Pain Management    NERVE BLOCK Right 1/18/2016    right hip injection  #1    NERVE BLOCK Right 1/25/16    hip injection #2    NERVE BLOCK  2/17/15    bilateral occipital     NERVE BLOCK Left 08/15/2016    genicular #1    NERVE BLOCK  09/07/2016    left genicular nerve block #2    NERVE BLOCK Right 12/07/2016    hip injection #2    NERVE BLOCK Right 04/19/2017    hip #1    NERVE BLOCK Right 09/11/2017    hip #2    OTHER SURGICAL HISTORY Right 2 9 15    lumbar radiofrequency    OTHER SURGICAL HISTORY Right 3/9/16    lumbar radiofrequency    OTHER SURGICAL HISTORY Right 11/14/2016    right hip injection #1    OTHER SURGICAL HISTORY Left 12/19/2016    genicular radiofrequency    ROTATOR CUFF REPAIR  1999 at 42 years    Dr Miesha Mays Not Detected   CBC Auto Differential   Result Value Ref Range    WBC 11.6 (H) 4.5 - 11.5 E9/L    RBC 4.95 3.50 - 5.50 E12/L    Hemoglobin 14.8 11.5 - 15.5 g/dL    Hematocrit 45.3 34.0 - 48.0 %    MCV 91.5 80.0 - 99.9 fL    MCH 29.9 26.0 - 35.0 pg    MCHC 32.7 32.0 - 34.5 %    RDW 13.5 11.5 - 15.0 fL    Platelets 543 650 - 714 E9/L    MPV 11.1 7.0 - 12.0 fL    Neutrophils % 73.7 43.0 - 80.0 %    Immature Granulocytes % 0.5 0.0 - 5.0 %    Lymphocytes % 17.2 (L) 20.0 - 42.0 %    Monocytes % 6.9 2.0 - 12.0 %    Eosinophils % 0.9 0.0 - 6.0 %    Basophils % 0.8 0.0 - 2.0 %    Neutrophils # 8.54 (H) 1.80 - 7.30 E9/L    Immature Granulocytes # 0.06 E9/L    Lymphocytes # 1.99 1.50 - 4.00 E9/L    Monocytes # 0.80 0.10 - 0.95 E9/L    Eosinophils # 0.11 0.05 - 0.50 E9/L    Basophils # 0.09 0.00 - 0.20 E9/L   Comprehensive Metabolic Panel   Result Value Ref Range    Sodium 140 132 - 146 mmol/L    Potassium 4.2 3.5 - 5.0 mmol/L    Chloride 100 98 - 107 mmol/L    CO2 29 22 - 29 mmol/L    Anion Gap 11 7 - 16 mmol/L    Glucose 128 (H) 74 - 109 mg/dL    BUN 20 6 - 20 mg/dL    CREATININE 0.8 0.5 - 1.0 mg/dL    GFR Non-African American >60 >=60 mL/min/1.73    GFR African American >60     Calcium 9.7 8.6 - 10.2 mg/dL    Total Protein 7.5 6.4 - 8.3 g/dL    Alb 4.3 3.5 - 5.2 g/dL    Total Bilirubin 0.2 0.0 - 1.2 mg/dL    Alkaline Phosphatase 81 35 - 104 U/L    ALT 18 0 - 32 U/L    AST 18 0 - 31 U/L   Troponin   Result Value Ref Range    Troponin 0.03 0.00 - 0.03 ng/mL   Lactic Acid, Plasma   Result Value Ref Range    Lactic Acid 1.6 0.5 - 2.2 mmol/L   Brain Natriuretic Peptide   Result Value Ref Range    Pro- (H) 0 - 125 pg/mL   EKG 12 Lead   Result Value Ref Range    Ventricular Rate 54 BPM    Atrial Rate 54 BPM    P-R Interval 122 ms    QRS Duration 96 ms    Q-T Interval 450 ms    QTc Calculation (Bazett) 426 ms    P Axis 56 degrees    R Axis 50 degrees    T Axis 77 degrees       Imaging:   All Radiology results interpreted by Radiologist unless otherwise noted. XR CHEST PORTABLE   Final Result   Cardiomegaly   Findings compatible with atherosclerotic disease of the aorta. EKG #1:  Interpreted by emergency department physician unless otherwise noted. Time:  12:31    Rate: 58  Rhythm: Sinus. Interpretation: Diffuse ST elevations. ED Course / Medical Decision Making     Medications   0.9 % sodium chloride bolus (0 mLs Intravenous Stopped 9/22/18 1426)   ketorolac (TORADOL) injection 15 mg (15 mg Intravenous Given 9/22/18 1313)   morphine (PF) injection 4 mg (4 mg Intravenous Given 9/22/18 1512)        Re-Evaluations:  9/22/18      Time:14:08  Patients symptoms are improving. Repeat EKG improved, ST elevations less pronounced    Consultations:             IP CONSULT TO CARDIOLOGY  IP CONSULT TO INTERNAL MEDICINE    Procedures:   none    MDM:  Patient with history of recurrent myocarditis presents with shortness of breath, URI symptoms and chest tightness. Initial EKG with diffuse ST elevations. Likely this is infectious rather than ischemic as there are no reciprocal changes. Chest x-ray with no evidence of pneumonia. Troponin 0.03 elevated from her last baseline of less than 0.01. Laboratory studies otherwise grossly unremarkable except for mild leukocytosis, 11.6. Influenza negative. Film array was sent. Repeat EKG shows improvement after Toradol. Patient's symptoms are decreased. She will be seen by cardiology in the emergency department and admitted for further testing. Counseling:   I have spoken with the patient and discussed todays results, in addition to providing specific details for the plan of care and counseling regarding the diagnosis and prognosis and are agreeable with the plan. Assessment      1. Atypical chest pain    2.  Possible pericarditis vs myocarditis    This patient's ED course included: a personal history and physicial examination  This patient has remained hemodynamically stable during their ED course. Plan   Admit to telemetry. Patient condition is stable. New Medications     New Prescriptions    No medications on file     Electronically signed by Lorna Mcfarlane DO   DD: 9/22/18  **This report was transcribed using voice recognition software. Every effort was made to ensure accuracy; however, inadvertent computerized transcription errors may be present.   END OF PROVIDER NOTE        Lorna Mcfarlane DO  09/22/18 [id number]

## 2018-09-22 NOTE — ED NOTES
Pt signed release of records for Dr Manuelito Jacobs RN unable to find a fax number to send release to obtain previous records   No answer on Saturday office is closed  Release of Information is attached to soft chart     Adamaris Guardado RN  09/22/18 [id number]

## 2018-09-23 PROBLEM — I21.4 NON-STEMI (NON-ST ELEVATED MYOCARDIAL INFARCTION) (HCC): Status: ACTIVE | Noted: 2018-09-23

## 2018-09-23 LAB
ANION GAP SERPL CALCULATED.3IONS-SCNC: 12 MMOL/L (ref 7–16)
APTT: 30.2 SEC (ref 24.5–35.1)
APTT: 77.6 SEC (ref 24.5–35.1)
BUN BLDV-MCNC: 17 MG/DL (ref 6–20)
CALCIUM SERPL-MCNC: 8.7 MG/DL (ref 8.6–10.2)
CHLORIDE BLD-SCNC: 102 MMOL/L (ref 98–107)
CO2: 28 MMOL/L (ref 22–29)
CREAT SERPL-MCNC: 0.8 MG/DL (ref 0.5–1)
GFR AFRICAN AMERICAN: >60
GFR NON-AFRICAN AMERICAN: >60 ML/MIN/1.73
GLUCOSE BLD-MCNC: 92 MG/DL (ref 74–109)
HCT VFR BLD CALC: 42.2 % (ref 34–48)
HEMOGLOBIN: 13.6 G/DL (ref 11.5–15.5)
LV EF: 65 %
LVEF MODALITY: NORMAL
MCH RBC QN AUTO: 30.4 PG (ref 26–35)
MCHC RBC AUTO-ENTMCNC: 32.2 % (ref 32–34.5)
MCV RBC AUTO: 94.4 FL (ref 80–99.9)
PDW BLD-RTO: 13.8 FL (ref 11.5–15)
PLATELET # BLD: 282 E9/L (ref 130–450)
PMV BLD AUTO: 11.4 FL (ref 7–12)
POTASSIUM SERPL-SCNC: 3.9 MMOL/L (ref 3.5–5)
RBC # BLD: 4.47 E12/L (ref 3.5–5.5)
SEDIMENTATION RATE, ERYTHROCYTE: 19 MM/HR (ref 0–20)
SODIUM BLD-SCNC: 142 MMOL/L (ref 132–146)
WBC # BLD: 15.3 E9/L (ref 4.5–11.5)

## 2018-09-23 PROCEDURE — 6370000000 HC RX 637 (ALT 250 FOR IP): Performed by: INTERNAL MEDICINE

## 2018-09-23 PROCEDURE — 85027 COMPLETE CBC AUTOMATED: CPT

## 2018-09-23 PROCEDURE — 93005 ELECTROCARDIOGRAM TRACING: CPT | Performed by: INTERNAL MEDICINE

## 2018-09-23 PROCEDURE — 99233 SBSQ HOSP IP/OBS HIGH 50: CPT | Performed by: INTERNAL MEDICINE

## 2018-09-23 PROCEDURE — 80048 BASIC METABOLIC PNL TOTAL CA: CPT

## 2018-09-23 PROCEDURE — 36415 COLL VENOUS BLD VENIPUNCTURE: CPT

## 2018-09-23 PROCEDURE — 93306 TTE W/DOPPLER COMPLETE: CPT

## 2018-09-23 PROCEDURE — 2700000000 HC OXYGEN THERAPY PER DAY

## 2018-09-23 PROCEDURE — 85730 THROMBOPLASTIN TIME PARTIAL: CPT

## 2018-09-23 PROCEDURE — 2140000000 HC CCU INTERMEDIATE R&B

## 2018-09-23 PROCEDURE — 6360000002 HC RX W HCPCS: Performed by: INTERNAL MEDICINE

## 2018-09-23 PROCEDURE — 2580000003 HC RX 258: Performed by: INTERNAL MEDICINE

## 2018-09-23 RX ORDER — HEPARIN SODIUM 1000 [USP'U]/ML
2000 INJECTION, SOLUTION INTRAVENOUS; SUBCUTANEOUS PRN
Status: DISCONTINUED | OUTPATIENT
Start: 2018-09-23 | End: 2018-09-24

## 2018-09-23 RX ORDER — HEPARIN SODIUM 1000 [USP'U]/ML
4000 INJECTION, SOLUTION INTRAVENOUS; SUBCUTANEOUS PRN
Status: DISCONTINUED | OUTPATIENT
Start: 2018-09-23 | End: 2018-09-24

## 2018-09-23 RX ORDER — ATORVASTATIN CALCIUM 40 MG/1
80 TABLET, FILM COATED ORAL NIGHTLY
Status: DISCONTINUED | OUTPATIENT
Start: 2018-09-23 | End: 2018-09-25 | Stop reason: HOSPADM

## 2018-09-23 RX ORDER — HYDROCODONE BITARTRATE AND ACETAMINOPHEN 7.5; 325 MG/1; MG/1
1 TABLET ORAL EVERY 4 HOURS PRN
Status: DISCONTINUED | OUTPATIENT
Start: 2018-09-23 | End: 2018-09-25 | Stop reason: HOSPADM

## 2018-09-23 RX ORDER — HEPARIN SODIUM 10000 [USP'U]/100ML
12 INJECTION, SOLUTION INTRAVENOUS CONTINUOUS
Status: DISCONTINUED | OUTPATIENT
Start: 2018-09-23 | End: 2018-09-24

## 2018-09-23 RX ORDER — HEPARIN SODIUM 1000 [USP'U]/ML
4000 INJECTION, SOLUTION INTRAVENOUS; SUBCUTANEOUS ONCE
Status: COMPLETED | OUTPATIENT
Start: 2018-09-23 | End: 2018-09-23

## 2018-09-23 RX ADMIN — ACETAMINOPHEN 650 MG: 325 TABLET, FILM COATED ORAL at 13:04

## 2018-09-23 RX ADMIN — HEPARIN SODIUM 4000 UNITS: 1000 INJECTION INTRAVENOUS; SUBCUTANEOUS at 15:03

## 2018-09-23 RX ADMIN — CETIRIZINE HYDROCHLORIDE 10 MG: 10 TABLET, FILM COATED ORAL at 21:36

## 2018-09-23 RX ADMIN — Medication 10 ML: at 10:42

## 2018-09-23 RX ADMIN — HYDROCODONE BITARTRATE AND ACETAMINOPHEN 1 TABLET: 7.5; 325 TABLET ORAL at 04:59

## 2018-09-23 RX ADMIN — MELOXICAM 15 MG: 7.5 TABLET ORAL at 17:17

## 2018-09-23 RX ADMIN — MIRTAZAPINE 30 MG: 15 TABLET, FILM COATED ORAL at 21:36

## 2018-09-23 RX ADMIN — BUPROPION HYDROCHLORIDE 150 MG: 150 TABLET, EXTENDED RELEASE ORAL at 21:37

## 2018-09-23 RX ADMIN — ISOSORBIDE MONONITRATE 60 MG: 30 TABLET ORAL at 10:46

## 2018-09-23 RX ADMIN — BUPROPION HYDROCHLORIDE 150 MG: 150 TABLET, EXTENDED RELEASE ORAL at 10:39

## 2018-09-23 RX ADMIN — CHLORTHALIDONE 25 MG: 25 TABLET ORAL at 10:40

## 2018-09-23 RX ADMIN — ACETAMINOPHEN 650 MG: 325 TABLET, FILM COATED ORAL at 07:03

## 2018-09-23 RX ADMIN — PREGABALIN 100 MG: 100 CAPSULE ORAL at 21:36

## 2018-09-23 RX ADMIN — ATORVASTATIN CALCIUM 80 MG: 40 TABLET, FILM COATED ORAL at 21:36

## 2018-09-23 RX ADMIN — LOSARTAN POTASSIUM 50 MG: 50 TABLET, FILM COATED ORAL at 21:36

## 2018-09-23 RX ADMIN — METOPROLOL SUCCINATE 100 MG: 100 TABLET, FILM COATED, EXTENDED RELEASE ORAL at 13:47

## 2018-09-23 RX ADMIN — HEPARIN SODIUM AND DEXTROSE 12 UNITS/KG/HR: 10000; 5 INJECTION INTRAVENOUS at 15:18

## 2018-09-23 RX ADMIN — PANTOPRAZOLE SODIUM 40 MG: 40 TABLET, DELAYED RELEASE ORAL at 07:00

## 2018-09-23 RX ADMIN — PREGABALIN 100 MG: 100 CAPSULE ORAL at 10:42

## 2018-09-23 RX ADMIN — HYDROCODONE BITARTRATE AND ACETAMINOPHEN 1 TABLET: 7.5; 325 TABLET ORAL at 13:48

## 2018-09-23 RX ADMIN — HYDROCODONE BITARTRATE AND ACETAMINOPHEN 1 TABLET: 7.5; 325 TABLET ORAL at 21:37

## 2018-09-23 RX ADMIN — AMLODIPINE BESYLATE 10 MG: 10 TABLET ORAL at 17:16

## 2018-09-23 ASSESSMENT — PAIN SCALES - GENERAL
PAINLEVEL_OUTOF10: 0
PAINLEVEL_OUTOF10: 8
PAINLEVEL_OUTOF10: 8
PAINLEVEL_OUTOF10: 0
PAINLEVEL_OUTOF10: 4
PAINLEVEL_OUTOF10: 10
PAINLEVEL_OUTOF10: 0
PAINLEVEL_OUTOF10: 8
PAINLEVEL_OUTOF10: 9
PAINLEVEL_OUTOF10: 7

## 2018-09-23 ASSESSMENT — PAIN DESCRIPTION - LOCATION
LOCATION: HEAD
LOCATION: HEAD
LOCATION: BACK

## 2018-09-23 ASSESSMENT — PAIN DESCRIPTION - PAIN TYPE
TYPE: CHRONIC PAIN
TYPE: CHRONIC PAIN
TYPE: ACUTE PAIN
TYPE: ACUTE PAIN

## 2018-09-23 ASSESSMENT — PAIN DESCRIPTION - DESCRIPTORS: DESCRIPTORS: ACHING

## 2018-09-23 NOTE — H&P
rash  Neuro:  Cranial nerves 2-12 intact, no focal deficits  Breast: deferred  Rectal: deferred  Genitalia:  deferred    LABS:  CBC:   Lab Results   Component Value Date    WBC 15.3 09/23/2018    RBC 4.47 09/23/2018    HGB 13.6 09/23/2018    HCT 42.2 09/23/2018    MCV 94.4 09/23/2018    MCH 30.4 09/23/2018    MCHC 32.2 09/23/2018    RDW 13.8 09/23/2018     09/23/2018    MPV 11.4 09/23/2018     BMP:    Lab Results   Component Value Date     09/23/2018    K 3.9 09/23/2018     09/23/2018    CO2 28 09/23/2018    BUN 17 09/23/2018    CREATININE 0.8 09/23/2018    CALCIUM 8.7 09/23/2018    GFRAA >60 09/23/2018    LABGLOM >60 09/23/2018    GLUCOSE 92 09/23/2018     PTT:    Lab Results   Component Value Date    APTT 30.2 09/23/2018     Last 3 Troponin:    Lab Results   Component Value Date    TROPONINI 0.60 09/22/2018    TROPONINI 0.41 09/22/2018    TROPONINI 0.03 09/22/2018       EKG and imaging studies reviewed      ASSESSMENT:      Patient Active Problem List   Diagnosis    Non-STEMI (non-ST elevated myocardial infarction)    HTN (hypertension), benign    History of myocarditis     Ankylosing spondylitis    OLIVIA (obstructive sleep apnea)    Mixed hyperlipidemia    Morbid obesity       PLAN:    1) admit to monitored bed  2) cardiology consult (done)  3) daily aspirin  4) continue beta blocker and start statin  5) for heart cath tomorrow  6) the patient is expected to stay 2 or more midnights to evaluate and treat her non STEMI      Please note that over 50 minutes was spent in evaluating the patient, review of records and results, discussion with staff/family, etc.    Marielena Upton MD  3:45 PM  9/23/2018

## 2018-09-24 ENCOUNTER — APPOINTMENT (OUTPATIENT)
Dept: CARDIAC CATH/INVASIVE PROCEDURES | Age: 58
DRG: 190 | End: 2018-09-24
Payer: MEDICAID

## 2018-09-24 LAB
ABO/RH: NORMAL
ANION GAP SERPL CALCULATED.3IONS-SCNC: 13 MMOL/L (ref 7–16)
ANTIBODY SCREEN: NORMAL
APTT: 116.2 SEC (ref 24.5–35.1)
BUN BLDV-MCNC: 22 MG/DL (ref 6–20)
CALCIUM SERPL-MCNC: 9 MG/DL (ref 8.6–10.2)
CHLORIDE BLD-SCNC: 103 MMOL/L (ref 98–107)
CHOLESTEROL, TOTAL: 247 MG/DL (ref 0–199)
CO2: 27 MMOL/L (ref 22–29)
CREAT SERPL-MCNC: 0.9 MG/DL (ref 0.5–1)
EKG ATRIAL RATE: 53 BPM
EKG P AXIS: 49 DEGREES
EKG P-R INTERVAL: 118 MS
EKG Q-T INTERVAL: 486 MS
EKG QRS DURATION: 92 MS
EKG QTC CALCULATION (BAZETT): 456 MS
EKG R AXIS: 12 DEGREES
EKG T AXIS: 23 DEGREES
EKG VENTRICULAR RATE: 53 BPM
GFR AFRICAN AMERICAN: >60
GFR NON-AFRICAN AMERICAN: >60 ML/MIN/1.73
GLUCOSE BLD-MCNC: 106 MG/DL (ref 74–109)
HCT VFR BLD CALC: 43.5 % (ref 34–48)
HDLC SERPL-MCNC: 35 MG/DL
HEMOGLOBIN: 14.1 G/DL (ref 11.5–15.5)
LDL CHOLESTEROL CALCULATED: 158 MG/DL (ref 0–99)
MCH RBC QN AUTO: 30.1 PG (ref 26–35)
MCHC RBC AUTO-ENTMCNC: 32.4 % (ref 32–34.5)
MCV RBC AUTO: 92.9 FL (ref 80–99.9)
PDW BLD-RTO: 13.8 FL (ref 11.5–15)
PLATELET # BLD: 290 E9/L (ref 130–450)
PMV BLD AUTO: 11.4 FL (ref 7–12)
POTASSIUM SERPL-SCNC: 4 MMOL/L (ref 3.5–5)
RBC # BLD: 4.68 E12/L (ref 3.5–5.5)
SODIUM BLD-SCNC: 143 MMOL/L (ref 132–146)
TRIGL SERPL-MCNC: 272 MG/DL (ref 0–149)
VLDLC SERPL CALC-MCNC: 54 MG/DL
WBC # BLD: 10.8 E9/L (ref 4.5–11.5)

## 2018-09-24 PROCEDURE — 2580000003 HC RX 258: Performed by: INTERNAL MEDICINE

## 2018-09-24 PROCEDURE — 6370000000 HC RX 637 (ALT 250 FOR IP): Performed by: INTERNAL MEDICINE

## 2018-09-24 PROCEDURE — C1725 CATH, TRANSLUMIN NON-LASER: HCPCS

## 2018-09-24 PROCEDURE — 86850 RBC ANTIBODY SCREEN: CPT

## 2018-09-24 PROCEDURE — 2709999900 HC NON-CHARGEABLE SUPPLY

## 2018-09-24 PROCEDURE — 86900 BLOOD TYPING SEROLOGIC ABO: CPT

## 2018-09-24 PROCEDURE — B2151ZZ FLUOROSCOPY OF LEFT HEART USING LOW OSMOLAR CONTRAST: ICD-10-PCS | Performed by: INTERNAL MEDICINE

## 2018-09-24 PROCEDURE — 99233 SBSQ HOSP IP/OBS HIGH 50: CPT | Performed by: INTERNAL MEDICINE

## 2018-09-24 PROCEDURE — C1769 GUIDE WIRE: HCPCS

## 2018-09-24 PROCEDURE — 2700000000 HC OXYGEN THERAPY PER DAY

## 2018-09-24 PROCEDURE — 85027 COMPLETE CBC AUTOMATED: CPT

## 2018-09-24 PROCEDURE — 4A023N7 MEASUREMENT OF CARDIAC SAMPLING AND PRESSURE, LEFT HEART, PERCUTANEOUS APPROACH: ICD-10-PCS | Performed by: INTERNAL MEDICINE

## 2018-09-24 PROCEDURE — 93010 ELECTROCARDIOGRAM REPORT: CPT | Performed by: INTERNAL MEDICINE

## 2018-09-24 PROCEDURE — 6360000002 HC RX W HCPCS

## 2018-09-24 PROCEDURE — 86901 BLOOD TYPING SEROLOGIC RH(D): CPT

## 2018-09-24 PROCEDURE — 36415 COLL VENOUS BLD VENIPUNCTURE: CPT

## 2018-09-24 PROCEDURE — 2500000003 HC RX 250 WO HCPCS

## 2018-09-24 PROCEDURE — 2140000000 HC CCU INTERMEDIATE R&B

## 2018-09-24 PROCEDURE — 80061 LIPID PANEL: CPT

## 2018-09-24 PROCEDURE — C1887 CATHETER, GUIDING: HCPCS

## 2018-09-24 PROCEDURE — 6370000000 HC RX 637 (ALT 250 FOR IP)

## 2018-09-24 PROCEDURE — 93458 L HRT ARTERY/VENTRICLE ANGIO: CPT | Performed by: INTERNAL MEDICINE

## 2018-09-24 PROCEDURE — B2111ZZ FLUOROSCOPY OF MULTIPLE CORONARY ARTERIES USING LOW OSMOLAR CONTRAST: ICD-10-PCS | Performed by: INTERNAL MEDICINE

## 2018-09-24 PROCEDURE — 80048 BASIC METABOLIC PNL TOTAL CA: CPT

## 2018-09-24 PROCEDURE — 85730 THROMBOPLASTIN TIME PARTIAL: CPT

## 2018-09-24 PROCEDURE — C1894 INTRO/SHEATH, NON-LASER: HCPCS

## 2018-09-24 RX ORDER — MORPHINE SULFATE 2 MG/ML
2 INJECTION, SOLUTION INTRAMUSCULAR; INTRAVENOUS ONCE
Status: DISCONTINUED | OUTPATIENT
Start: 2018-09-24 | End: 2018-09-25 | Stop reason: HOSPADM

## 2018-09-24 RX ORDER — SODIUM CHLORIDE 9 MG/ML
INJECTION, SOLUTION INTRAVENOUS CONTINUOUS
Status: DISCONTINUED | OUTPATIENT
Start: 2018-09-24 | End: 2018-09-25 | Stop reason: HOSPADM

## 2018-09-24 RX ORDER — SODIUM CHLORIDE 0.9 % (FLUSH) 0.9 %
10 SYRINGE (ML) INJECTION EVERY 12 HOURS SCHEDULED
Status: DISCONTINUED | OUTPATIENT
Start: 2018-09-24 | End: 2018-09-24 | Stop reason: SDUPTHER

## 2018-09-24 RX ORDER — SODIUM CHLORIDE 0.9 % (FLUSH) 0.9 %
10 SYRINGE (ML) INJECTION PRN
Status: DISCONTINUED | OUTPATIENT
Start: 2018-09-24 | End: 2018-09-24 | Stop reason: SDUPTHER

## 2018-09-24 RX ORDER — CLOPIDOGREL BISULFATE 75 MG/1
75 TABLET ORAL DAILY
Status: DISCONTINUED | OUTPATIENT
Start: 2018-09-25 | End: 2018-09-25 | Stop reason: HOSPADM

## 2018-09-24 RX ADMIN — PREGABALIN 100 MG: 100 CAPSULE ORAL at 09:28

## 2018-09-24 RX ADMIN — BUPROPION HYDROCHLORIDE 150 MG: 150 TABLET, EXTENDED RELEASE ORAL at 20:41

## 2018-09-24 RX ADMIN — HYDROCODONE BITARTRATE AND ACETAMINOPHEN 1 TABLET: 7.5; 325 TABLET ORAL at 20:41

## 2018-09-24 RX ADMIN — PANTOPRAZOLE SODIUM 40 MG: 40 TABLET, DELAYED RELEASE ORAL at 09:28

## 2018-09-24 RX ADMIN — LOSARTAN POTASSIUM 50 MG: 50 TABLET, FILM COATED ORAL at 09:28

## 2018-09-24 RX ADMIN — Medication 10 ML: at 20:42

## 2018-09-24 RX ADMIN — AMLODIPINE BESYLATE 10 MG: 10 TABLET ORAL at 09:28

## 2018-09-24 RX ADMIN — CHLORTHALIDONE 25 MG: 25 TABLET ORAL at 09:28

## 2018-09-24 RX ADMIN — BUPROPION HYDROCHLORIDE 150 MG: 150 TABLET, EXTENDED RELEASE ORAL at 09:28

## 2018-09-24 RX ADMIN — HYDROCODONE BITARTRATE AND ACETAMINOPHEN 1 TABLET: 7.5; 325 TABLET ORAL at 14:38

## 2018-09-24 RX ADMIN — HYDROCODONE BITARTRATE AND ACETAMINOPHEN 1 TABLET: 7.5; 325 TABLET ORAL at 05:14

## 2018-09-24 RX ADMIN — ATORVASTATIN CALCIUM 80 MG: 40 TABLET, FILM COATED ORAL at 20:42

## 2018-09-24 RX ADMIN — Medication 10 ML: at 09:28

## 2018-09-24 RX ADMIN — MELOXICAM 15 MG: 7.5 TABLET ORAL at 09:28

## 2018-09-24 RX ADMIN — PREGABALIN 100 MG: 100 CAPSULE ORAL at 20:41

## 2018-09-24 RX ADMIN — CETIRIZINE HYDROCHLORIDE 10 MG: 10 TABLET, FILM COATED ORAL at 20:41

## 2018-09-24 RX ADMIN — ACETAMINOPHEN 650 MG: 325 TABLET, FILM COATED ORAL at 18:53

## 2018-09-24 RX ADMIN — ISOSORBIDE MONONITRATE 60 MG: 30 TABLET ORAL at 09:28

## 2018-09-24 RX ADMIN — HYDROCODONE BITARTRATE AND ACETAMINOPHEN 1 TABLET: 7.5; 325 TABLET ORAL at 10:58

## 2018-09-24 RX ADMIN — MIRTAZAPINE 30 MG: 15 TABLET, FILM COATED ORAL at 20:41

## 2018-09-24 RX ADMIN — METOPROLOL SUCCINATE 100 MG: 100 TABLET, FILM COATED, EXTENDED RELEASE ORAL at 09:28

## 2018-09-24 RX ADMIN — ACETAMINOPHEN 650 MG: 325 TABLET, FILM COATED ORAL at 09:24

## 2018-09-24 ASSESSMENT — PAIN SCALES - GENERAL
PAINLEVEL_OUTOF10: 9
PAINLEVEL_OUTOF10: 3
PAINLEVEL_OUTOF10: 9
PAINLEVEL_OUTOF10: 8
PAINLEVEL_OUTOF10: 8
PAINLEVEL_OUTOF10: 9

## 2018-09-24 ASSESSMENT — PAIN DESCRIPTION - LOCATION
LOCATION: BACK
LOCATION: BACK;SHOULDER
LOCATION: BACK;ARM

## 2018-09-24 ASSESSMENT — PAIN DESCRIPTION - PROGRESSION: CLINICAL_PROGRESSION: NOT CHANGED

## 2018-09-24 ASSESSMENT — PAIN DESCRIPTION - PAIN TYPE
TYPE: ACUTE PAIN
TYPE: ACUTE PAIN

## 2018-09-24 ASSESSMENT — PAIN DESCRIPTION - ORIENTATION
ORIENTATION: MID;RIGHT
ORIENTATION: MID;RIGHT

## 2018-09-24 ASSESSMENT — PAIN DESCRIPTION - ONSET
ONSET: ON-GOING
ONSET: ON-GOING

## 2018-09-24 ASSESSMENT — PAIN DESCRIPTION - FREQUENCY
FREQUENCY: CONTINUOUS
FREQUENCY: CONTINUOUS

## 2018-09-24 ASSESSMENT — PAIN DESCRIPTION - DESCRIPTORS
DESCRIPTORS: ACHING;SORE;DISCOMFORT
DESCRIPTORS: ACHING;SORE;DISCOMFORT
DESCRIPTORS: ACHING

## 2018-09-24 NOTE — PROGRESS NOTES
[facility] Cardiology Inpatient Progress Note    Patient is a 66 y.o. female of Lissett Longoria MD seen in hospital follow up. Chief complaint: CP/NSTEMI    HPI: No CP or SOB.      Patient Active Problem List   Diagnosis    HTN (hypertension), benign    Osteoarthritis of multiple joints    Anxiety and depression    IGT (impaired glucose tolerance)    Diverticulosis of sigmoid colon    GERD (gastroesophageal reflux disease)    Osteoarthritis of bilateral hip    Facet syndrome, lumbar ([facility]    Pulmonary nodule    Valvular heart disease    Deformity of right foot    Chronic myocarditis ([facility]    Chronic pain syndrome    HLA B27 (HLA B27 positive)    OLIVIA (obstructive sleep apnea)    Mixed hyperlipidemia    Non-STEMI (non-ST elevated myocardial infarction) ([facility]       No Known Allergies    Current Facility-Administered Medications   Medication Dose Route Frequency Provider Last Rate Last Dose    heparin (porcine) injection 4,000 Units  4,000 Units Intravenous PRN Kar Graves MD        heparin (porcine) injection 2,000 Units  2,000 Units Intravenous PRN Kar Graves MD        heparin 25,000 units in dextrose 5% 250 mL infusion  12 Units/kg/hr Intravenous Continuous Bethany Bell MD 6.9 mL/hr at 09/24/18 0800 9 Units/kg/hr at 09/24/18 0800    atorvastatin (LIPITOR) tablet 80 mg  80 mg Oral Nightly Kar Graves MD   80 mg at 09/23/18 2136    HYDROcodone-acetaminophen (Yolie Dillon) 7.5-325 MG per tablet 1 tablet  1 tablet Oral Q4H PRN Zeina Cooney MD   1 tablet at 09/24/18 0514    isosorbide mononitrate (IMDUR) extended release tablet 60 mg  60 mg Oral Daily Bethany Bell MD   60 mg at 09/24/18 0928    amLODIPine (NORVASC) tablet 10 mg  10 mg Oral Daily Kar Graves MD   10 mg at 09/24/18 0928    buPROPion [facility]) extended release tablet 150 mg  150 mg Oral BID Zeina Cooney MD   150 mg at 09/24/18 0928    chlorthalidone (HYGROTON) tablet mood changed, anxiety, depression. Physical Exam   /67   Pulse 57   Temp 98.1 °F (36.7 °C) (Oral)   Resp 16   Ht 4' 9\" (1.448 m)   Wt 170 lb (77.1 kg)   SpO2 96%   BMI 36.79 kg/m²   Constitutional: Oriented to person, place, and time. No distress. Well developed. Head: Normocephalic and atraumatic. Neck: Neck supple. No hepatojugular reflux. No JVD present. Carotid bruit is not present. No tracheal deviation present. No thyromegaly present. Cardiovascular: Normal rate, regular rhythm, normal heart sounds. intact distal pulses. No gallop and no friction rub. No murmur heard. Pulmonary: Breath sounds normal. No respiratory distress. No wheezes. No rales. Chest: Effort normal. No tenderness. Abdominal: Soft. Bowel sounds are normal. No distension or mass. No tenderness, rebound or guarding. Musculoskeletal: . No tenderness. No clubbing or cyanosis. Extremitites: Intact distal pulses. No edema  Neurological: Alert and oriented to person, place, and time. Skin: Skin is warm and dry. No rash noted. Not diaphoretic. No erythema. Psychiatric: Normal mood and affect. Behavior is normal.   Lymphadenopathy: No cervical adenopathy. No groin adenopathy.       CBC:   Lab Results   Component Value Date    WBC 10.8 09/24/2018    RBC 4.68 09/24/2018    HGB 14.1 09/24/2018    HCT 43.5 09/24/2018    MCV 92.9 09/24/2018    MCH 30.1 09/24/2018    MCHC 32.4 09/24/2018    RDW 13.8 09/24/2018     09/24/2018    MPV 11.4 09/24/2018     BMP:   Lab Results   Component Value Date     09/24/2018    K 4.0 09/24/2018     09/24/2018    CO2 27 09/24/2018    BUN 22 09/24/2018    LABALBU 4.3 09/22/2018    LABALBU 4.4 04/05/2012    CREATININE 0.9 09/24/2018    CALCIUM 9.0 09/24/2018    GFRAA >60 09/24/2018    LABGLOM >60 09/24/2018     Magnesium:    Lab Results   Component Value Date    MG 1.8 09/12/2016     Cardiac Enzymes:   Lab Results   Component Value Date    CKTOTAL 70 09/17/2016    CKTOTAL

## 2018-09-24 NOTE — OP NOTE
CARDIAC CATHETERIZATION    : Neelam Sommers        Date of procedure: 9/24/2018      Indication:  1. NSTEMI  2. AUC indication: 3  3. AUC score: 9    Procedure: Left Heart Catheterization, coronary angiography, left ventriculography    Anesthesia: local and moderate conscious sedation  Time sedation was administered: 12.50. I was present in the room when sedation was administered. Procedure end time: 13.35  Time spent with face to face monitoring of moderate sedation: 45min    LHC performed via right radial approach using a Slender 6F sheath. 2.5mg of diluted Verapamil and 200mcg of nitroglycerine administered through the sheath. 5000U heparin administered IV after access. Informed consent was obtained. The patient was transferred to the catheterization laboratory in a stable condition. The right groin and right wrist were sterilely prepped and draped. Two mg of Versed and 50mcg of fentanyl was administered for conscious sedation. Then, 2% Xylocaine was infiltrated in the right wrist. Using Seldinger technique, the right radial arteriotomy was performed and a Slender 6F sheath was placed in the right radial artery. Sheath was adequately aspirated and flushed. Using diluted contrast, angiogram of the radial and brachial artery was obtained. Then, 2.5 mg of diluted verapamil and 200 mcg of diluted nitroglycerin was administered through a sheath. Angiogram of the left and right coronary system were obtained using a 5-Dutch Ritesh catheter. A pigtail was then used to prolapse into the aortic valve of the left ventricle. LVEDP was obtained. The LV was opacified using a power injection. Catheter was pulled back to measure a gradient    Based on the angiogram it was unclear if the RCA was nondominant or co dominant since it was 100% occluded. Heparin bolus was administered. RCA was engaged with a 6F JR4 guide. Angiogram revealed recannalization of the RCA.  There were two significant lesions in the RCA: however the caliber was < 2.0mm. In setting of normal EF and small caliber vessel that recannalized: it was decided not to proceed with PCI. At the end of the procedure, a TR band was successfully deployed with good hemostasis. There were no complications. Patient tolerated the procedure well and was transferred to floor for monitoring. Findings:  Left main: 0% stenosis  LAD: mild diffuse disease. Circumflex: CD. OM1: moderate proximal disease. RCA: CD.  Mid 90 and 99% stenosis. Small caliber vessel. LV angio: 60%  ejection fraction    Hemodynamics:  LV: 99/11(21) mmHg. No gradient across AV. Ao: 111/63(88). Sheath removed and TR band applied. There was good hemostasis achieved and the distal pulses were intact. Complication: None   Estimated blood loss: 10ml. Contrast use: 60vv    Post op diagnosis:  1. Severe sequential lesion in small caliber CD RCA  2. Normal EF    PLAN:  1. Medical therapy.

## 2018-09-25 ENCOUNTER — APPOINTMENT (OUTPATIENT)
Dept: GENERAL RADIOLOGY | Age: 58
DRG: 190 | End: 2018-09-25
Payer: MEDICAID

## 2018-09-25 VITALS
WEIGHT: 170 LBS | BODY MASS INDEX: 36.68 KG/M2 | TEMPERATURE: 97.8 F | SYSTOLIC BLOOD PRESSURE: 124 MMHG | RESPIRATION RATE: 16 BRPM | HEIGHT: 57 IN | HEART RATE: 60 BPM | DIASTOLIC BLOOD PRESSURE: 68 MMHG | OXYGEN SATURATION: 98 %

## 2018-09-25 LAB
ANION GAP SERPL CALCULATED.3IONS-SCNC: 14 MMOL/L (ref 7–16)
BUN BLDV-MCNC: 29 MG/DL (ref 6–20)
CALCIUM SERPL-MCNC: 8.6 MG/DL (ref 8.6–10.2)
CHLORIDE BLD-SCNC: 101 MMOL/L (ref 98–107)
CO2: 27 MMOL/L (ref 22–29)
CREAT SERPL-MCNC: 1.2 MG/DL (ref 0.5–1)
EKG ATRIAL RATE: 53 BPM
EKG P AXIS: 50 DEGREES
EKG P-R INTERVAL: 114 MS
EKG Q-T INTERVAL: 466 MS
EKG QRS DURATION: 90 MS
EKG QTC CALCULATION (BAZETT): 437 MS
EKG R AXIS: 27 DEGREES
EKG T AXIS: 32 DEGREES
EKG VENTRICULAR RATE: 53 BPM
GFR AFRICAN AMERICAN: 56
GFR NON-AFRICAN AMERICAN: 46 ML/MIN/1.73
GLUCOSE BLD-MCNC: 114 MG/DL (ref 74–109)
POTASSIUM REFLEX MAGNESIUM: 4.3 MMOL/L (ref 3.5–5)
SODIUM BLD-SCNC: 142 MMOL/L (ref 132–146)

## 2018-09-25 PROCEDURE — 6370000000 HC RX 637 (ALT 250 FOR IP): Performed by: INTERNAL MEDICINE

## 2018-09-25 PROCEDURE — 2580000003 HC RX 258: Performed by: INTERNAL MEDICINE

## 2018-09-25 PROCEDURE — 99233 SBSQ HOSP IP/OBS HIGH 50: CPT | Performed by: INTERNAL MEDICINE

## 2018-09-25 PROCEDURE — 93005 ELECTROCARDIOGRAM TRACING: CPT | Performed by: INTERNAL MEDICINE

## 2018-09-25 PROCEDURE — 71045 X-RAY EXAM CHEST 1 VIEW: CPT

## 2018-09-25 PROCEDURE — 36415 COLL VENOUS BLD VENIPUNCTURE: CPT

## 2018-09-25 PROCEDURE — 2700000000 HC OXYGEN THERAPY PER DAY

## 2018-09-25 PROCEDURE — 80048 BASIC METABOLIC PNL TOTAL CA: CPT

## 2018-09-25 RX ORDER — AMLODIPINE BESYLATE 10 MG/1
10 TABLET ORAL DAILY
Qty: 30 TABLET | Refills: 3 | Status: SHIPPED | OUTPATIENT
Start: 2018-09-26 | End: 2018-10-12 | Stop reason: SDUPTHER

## 2018-09-25 RX ORDER — ISOSORBIDE MONONITRATE 60 MG/1
60 TABLET, EXTENDED RELEASE ORAL DAILY
Qty: 30 TABLET | Refills: 3 | Status: ON HOLD | OUTPATIENT
Start: 2018-09-26 | End: 2018-10-02 | Stop reason: HOSPADM

## 2018-09-25 RX ORDER — CLOPIDOGREL BISULFATE 75 MG/1
75 TABLET ORAL DAILY
Qty: 30 TABLET | Refills: 3 | Status: SHIPPED | OUTPATIENT
Start: 2018-09-26 | End: 2020-03-13 | Stop reason: SDUPTHER

## 2018-09-25 RX ORDER — ATORVASTATIN CALCIUM 80 MG/1
80 TABLET, FILM COATED ORAL NIGHTLY
Qty: 30 TABLET | Refills: 3 | Status: SHIPPED | OUTPATIENT
Start: 2018-09-25 | End: 2019-03-07 | Stop reason: SDUPTHER

## 2018-09-25 RX ADMIN — ISOSORBIDE MONONITRATE 60 MG: 30 TABLET ORAL at 09:16

## 2018-09-25 RX ADMIN — PREGABALIN 100 MG: 100 CAPSULE ORAL at 09:16

## 2018-09-25 RX ADMIN — PANTOPRAZOLE SODIUM 40 MG: 40 TABLET, DELAYED RELEASE ORAL at 05:06

## 2018-09-25 RX ADMIN — AMLODIPINE BESYLATE 10 MG: 10 TABLET ORAL at 09:16

## 2018-09-25 RX ADMIN — BUPROPION HYDROCHLORIDE 150 MG: 150 TABLET, EXTENDED RELEASE ORAL at 09:16

## 2018-09-25 RX ADMIN — HYDROCODONE BITARTRATE AND ACETAMINOPHEN 1 TABLET: 7.5; 325 TABLET ORAL at 16:22

## 2018-09-25 RX ADMIN — HYDROCODONE BITARTRATE AND ACETAMINOPHEN 1 TABLET: 7.5; 325 TABLET ORAL at 06:11

## 2018-09-25 RX ADMIN — MELOXICAM 15 MG: 7.5 TABLET ORAL at 09:16

## 2018-09-25 RX ADMIN — HYDROCODONE BITARTRATE AND ACETAMINOPHEN 1 TABLET: 7.5; 325 TABLET ORAL at 10:47

## 2018-09-25 RX ADMIN — METOPROLOL SUCCINATE 100 MG: 100 TABLET, FILM COATED, EXTENDED RELEASE ORAL at 09:16

## 2018-09-25 RX ADMIN — CHLORTHALIDONE 25 MG: 25 TABLET ORAL at 09:16

## 2018-09-25 RX ADMIN — CLOPIDOGREL 75 MG: 75 TABLET, FILM COATED ORAL at 09:16

## 2018-09-25 RX ADMIN — Medication 10 ML: at 09:17

## 2018-09-25 RX ADMIN — LOSARTAN POTASSIUM 50 MG: 50 TABLET, FILM COATED ORAL at 09:16

## 2018-09-25 RX ADMIN — ACETAMINOPHEN 650 MG: 325 TABLET, FILM COATED ORAL at 00:22

## 2018-09-25 ASSESSMENT — PAIN DESCRIPTION - ONSET: ONSET: ON-GOING

## 2018-09-25 ASSESSMENT — PAIN SCALES - GENERAL
PAINLEVEL_OUTOF10: 0
PAINLEVEL_OUTOF10: 5
PAINLEVEL_OUTOF10: 9
PAINLEVEL_OUTOF10: 3
PAINLEVEL_OUTOF10: 8
PAINLEVEL_OUTOF10: 9
PAINLEVEL_OUTOF10: 0
PAINLEVEL_OUTOF10: 7

## 2018-09-25 ASSESSMENT — PAIN DESCRIPTION - FREQUENCY: FREQUENCY: CONTINUOUS

## 2018-09-25 ASSESSMENT — PAIN DESCRIPTION - LOCATION: LOCATION: BACK;SHOULDER

## 2018-09-25 ASSESSMENT — PAIN DESCRIPTION - DESCRIPTORS: DESCRIPTORS: ACHING;SORE;DISCOMFORT

## 2018-09-25 ASSESSMENT — PAIN DESCRIPTION - PROGRESSION
CLINICAL_PROGRESSION: NOT CHANGED

## 2018-09-25 ASSESSMENT — PAIN DESCRIPTION - PAIN TYPE: TYPE: ACUTE PAIN

## 2018-09-25 ASSESSMENT — PAIN DESCRIPTION - ORIENTATION: ORIENTATION: MID;RIGHT

## 2018-09-25 NOTE — PROGRESS NOTES
CLINICAL PHARMACY: DISCHARGE MED RECONCILIATION/REVIEW    [facility]) Select Patient?: No  Total # of Interventions Recommended: 0   -   Total # Interventions Accepted: 0  Intervention Severity:   - Level 1 Intervention Present?: No   - Level 2 #: 0   - Level 3 #: 0   Time Spent (min): 30    Additional Documentation:

## 2018-09-25 NOTE — PROGRESS NOTES
Outpatient Medication Discontinued   (or on Hold During Admission) 1. Clonidine  2. Ranitidine         Other 1. Pharmacist Patient Education:    Pharmacist Education Log:   Date  Person Educated Content of Education and   Printed Materials Provided     (Include Pharmacist Initials)    9/25/18 patient Isosorbide mononitrate, atorvastatin, amlodipine, clopidogrel - JR                         If applicable:   []  Unable to be complete education at this time due to: **       []  Barriers to counseling were identified: **    []  Follow-up education is required: **     []  In addition to the above, the patient was provided with the following additional        compliance tools: **     Documentation of Pharmacist Interventions and Follow-up Plan: The following Pharmacist Transition of Care Services were completed during the admission:  []  Entire home medication list was reviewed for accuracy (best possible medication        history was obtained)   []  Home medication list was updated or corrected     []  Reviewed and summarized home medication changes and new inpatient         medications    []  Patient education was provided on home medication changes  []  Patient education was provided on new inpatient medications    The following Pharmacist Transition of Care Services were completed as part of the discharge process:  []  Reviewed and/or assisted with discharge medication reconciliation  [x]  Discharge patient medication education was provided   []  Reviewed the After Visit Summary (AVS) with patient      Additional Interventions:  []  Inpatient prescriber was contacted and the following pharmacy recommendations        were accepted: **    [] Outpatient primary care physician was informed of medication changes that were       made during admission. **    [] Population [facility] Clinical Pharmacist was contacted to arrange post-discharge       review of medications. **     [] Other - see below:  1. Pharmacist: Gabriella Carter PharmD, [facility]  Date:  9/25/2018 3:32 PM

## 2018-09-25 NOTE — PROGRESS NOTES
[facility]) extended release tablet 150 mg  150 mg Oral BID Valentin Galo MD   150 mg at 09/25/18 8926    chlorthalidone (HYGROTON) tablet 25 mg  25 mg Oral Daily Valentin Galo MD   25 mg at 09/25/18 1212    losartan (COZAAR) tablet 50 mg  50 mg Oral Daily Valentin Galo MD   50 mg at 09/25/18 3383    cetirizine (ZYRTEC) tablet 10 mg  10 mg Oral Nightly Valentin Galo MD   10 mg at 09/24/18 2041    metoprolol succinate (TOPROL XL) extended release tablet 100 mg  100 mg Oral Daily Valentin Galo MD   100 mg at 09/25/18 0680    mirtazapine (REMERON) tablet 30 mg  30 mg Oral Nightly Valentin Galo MD   30 mg at 09/24/18 2041    pantoprazole (PROTONIX) tablet 40 mg  40 mg Oral QAM AC Valentin Galo MD   40 mg at 09/25/18 0506    pregabalin (LYRICA) capsule 100 mg  100 mg Oral BID Valentin Galo MD   100 mg at 09/25/18 2777    zolpidem (AMBIEN) tablet 10 mg  10 mg Oral Nightly PRN Valentin Galo MD        sodium chloride flush 0.9 % injection 10 mL  10 mL Intravenous 2 times per day Valentin Galo MD   10 mL at 09/25/18 0917    sodium chloride flush 0.9 % injection 10 mL  10 mL Intravenous PRN Valentin Galo MD        ondansetron [facility]) injection 4 mg  4 mg Intravenous Q6H PRN Valentin Galo MD        acetaminophen (TYLENOL) tablet 650 mg  650 mg Oral Q4H PRN Valentin Galo MD   650 mg at 09/25/18 0022    ipratropium-albuterol (DUONEB) nebulizer solution 1 ampule  1 ampule Inhalation Q4H PRN Valentin Galo MD        meloxicam [facility]) tablet 15 mg  15 mg Oral Daily Valentin Galo MD   15 mg at 09/25/18 0916    nitroGLYCERIN (NITROSTAT) SL tablet 0.4 mg  0.4 mg Sublingual Q5 Min PRN Maria Del Carmen Urbina MD   0.4 mg at 09/22/18 3642       Review of systems:   Heart: as above   Lungs: as above   Eyes: denies changes in vision or discharge. Ears: denies changes in hearing or pain. Nose: denies epistaxis or masses   Throat: denies sore throat or trouble swallowing.    Neuro: Lab Results   Component Value Date    MG 1.8 09/12/2016     Cardiac Enzymes:   Lab Results   Component Value Date    CKTOTAL 70 09/17/2016    CKTOTAL 72 09/17/2016    CKMB 2.7 09/17/2016    CKMB 2.4 09/17/2016    TROPONINI 0.60 (H) 09/22/2018    TROPONINI 0.41 (H) 09/22/2018    TROPONINI 0.03 09/22/2018      PT/INR:    Lab Results   Component Value Date    PROTIME 10.0 10/19/2017    INR 0.9 10/19/2017     TSH:    Lab Results   Component Value Date    TSH 3.320 08/30/2018     Rhythm Strip: normal sinus rhythm. ASSESSMENT & PLAN:    Patient Active Problem List   Diagnosis    HTN (hypertension), benign    Osteoarthritis of multiple joints    Anxiety and depression    IGT (impaired glucose tolerance)    Diverticulosis of sigmoid colon    GERD (gastroesophageal reflux disease)    Osteoarthritis of bilateral hip    Facet syndrome, lumbar (HCC)    Pulmonary nodule    Valvular heart disease    Deformity of right foot    Chronic myocarditis (HCC)    Chronic pain syndrome    HLA B27 (HLA B27 positive)    OLIVIA (obstructive sleep apnea)    Mixed hyperlipidemia    Non-STEMI (non-ST elevated myocardial infarction) ([facility] [place] [address]    CAD in native artery     1. Chest pain/NSTEMI/CAD:  CAD with severely diseased small RCA that is not amenable to PCI. Medical management. ASA/plavix/BB/statin/nitrate. 2. HTN: Observe. 3. OLIVIA    Gurvinder Rodarte D.O.   Cardiologist  Cardiology, [address] [place] [address]

## 2018-09-25 NOTE — CARE COORDINATION
SOCIAL WORK/DISCHARGE PLANNING;  Met with pt who is alert and oriented. Pt reported that she is  and lives with her  in a third floor apartment. Pt reported that her  has medical problems and is in a hospital bed. She is his primary care giver but her daughter is checking on him while she is here. Pt reports many current stressors . In addition to her 's issues, she has a son that is incarcerated. Pt also reported that she currently stopped smoking. Discussed care transition with pt. She feels she will be able to return home. She does not feel any services or HHC will be needed at discharge. Pt feels she remains at an independent level and does not feel she is homebound. Pt states her car is here and per pt, she's already discussed with her pcp driving herself home. Pt use [facility] pharmacy on [address]. Supportive counseling provided and pt encouraged to talk about her stressors. She feels that she does this and feels she is appropriately handling her stress. Continue support.   Hilario SANABRIA

## 2018-09-25 NOTE — DISCHARGE SUMMARY
tablet  Commonly known as:  HYGROTON  Take 1 tablet by mouth daily     ENBREL 50 MG/ML injection  Generic drug:  etanercept     HYDROcodone-acetaminophen 7.5-325 MG per tablet  Commonly known as:  NORCO  Take 1 tablet by mouth every 8 hours as needed for Pain for up to 30 days THIS PRESCRIPTION IS A 30 DAY SUPPLY. ( ICD-10:  G 89.4). Earliest Fill Date: 1/5/18     loratadine 10 MG capsule  Commonly known as:  CLARITIN  Take 1 capsule by mouth daily allergies     losartan 50 MG tablet  Commonly known as:  COZAAR  Take 1 tablet by mouth daily     meloxicam 15 MG tablet  Commonly known as:  MOBIC  TAKE 1 TABLET BY MOUTH EVERY DAY     metoprolol succinate 100 MG extended release tablet  Commonly known as:  TOPROL XL  TAKE 1 TABLET BY MOUTH DAILY     mirtazapine 30 MG tablet  Commonly known as:  REMERON     omeprazole 20 MG delayed release capsule  Commonly known as:  PRILOSEC  Take 1 capsule by mouth daily as needed (heartburn)     pregabalin 100 MG capsule  Commonly known as:  LYRICA  Take 1 capsule by mouth 2 times daily for 30 days.      therapeutic multivitamin-minerals tablet     varenicline 1 MG tablet  Commonly known as:  CHANTIX CONTINUING MONTH MARICEL  Take 1 tablet by mouth 2 times daily     VENTOLIN  (90 Base) MCG/ACT inhaler  Generic drug:  albuterol sulfate HFA  INHALE 2 PUFFS INTO THE LUNGS EVERY 6 HOURS AS NEEDED FOR WHEEZING OR SHORTNESS OF BREATH     Vitamin D3 2000 units Caps  Take 1 capsule by mouth daily     zolpidem 10 MG tablet  Commonly known as:  AMBIEN  Take 1 tablet by mouth nightly as needed for Sleep        STOP taking these medications    cloNIDine 0.1 MG tablet  Commonly known as:  CATAPRES     fluticasone 50 MCG/ACT nasal spray  Commonly known as:  FLONASE     HUMIRA 40 MG/0.8ML injection  Generic drug:  adalimumab     ranitidine 150 MG tablet  Commonly known as:  ZANTAC           Where to Get Your Medications      These medications were sent to [facility]

## 2018-09-25 NOTE — PROGRESS NOTES
Subjective: The patient is awake and alert. No problems overnight. Denies chest pain, angina, and dyspnea. Denies abdominal pain. Tolerating diet. No nausea or vomiting. Objective:    /68   Pulse 60   Temp 97.8 °F (36.6 °C) (Temporal)   Resp 16   Ht 4' 9\" (1.448 m)   Wt 170 lb (77.1 kg)   SpO2 98%   BMI 36.79 kg/m²     In: 600 [P.O.:600]  Out: 0     HEENT: NCAT,  PERRLA, No JVD  Heart:  RRR, no murmurs, gallops, or rubs.   Lungs:  CTA bilaterally, no wheeze, rales or rhonchi  Abd: bowel sounds present, nontender, nondistended, no masses  Extrem:  No clubbing, cyanosis, or edema     Recent Labs      09/23/18   1333  09/24/18   0459   WBC  15.3*  10.8   HGB  13.6  14.1   HCT  42.2  43.5   PLT  282  290       Recent Labs      09/23/18   0444  09/24/18   0459  09/25/18   0519   NA  142  143  142   K  3.9  4.0  4.3   CL  102  103  101   CO2  28  27  27   BUN  17  22*  29*   CREATININE  0.8  0.9  1.2*   CALCIUM  8.7  9.0  8.6       Assessment:    Patient Active Problem List   Diagnosis    HTN (hypertension), benign    Osteoarthritis of multiple joints    Anxiety and depression    IGT (impaired glucose tolerance)    Diverticulosis of sigmoid colon    GERD (gastroesophageal reflux disease)    Osteoarthritis of bilateral hip    Facet syndrome, lumbar (HCC)    Pulmonary nodule    Valvular heart disease    Deformity of right foot    Chronic myocarditis (HCC)    Chronic pain syndrome    HLA B27 (HLA B27 positive)    OLIVIA (obstructive sleep apnea)    Mixed hyperlipidemia    Non-STEMI (non-ST elevated myocardial infarction) ([facility] [address]    CAD in native artery       Plan:    Admitted for evaluation of NSTEMI - s/p LHC per cards   RCA not amenable to PCI   Placed on Statin and Plavix  CXR unremarkable   Tobacco cessation discussed at length , lifestyle modification discussed at length   Petra Jiang for home     DVT and GERD prophylaxis    All consultants notes reviewed    Rosario Wilson MD  3:19 PM  9/25/2018

## 2018-09-26 ENCOUNTER — TELEPHONE (OUTPATIENT)
Dept: FAMILY MEDICINE CLINIC | Age: 58
End: 2018-09-26

## 2018-09-26 NOTE — TELEPHONE ENCOUNTER
[facility] Initial Follow Up Call yes    Outreach made within 2 business days of discharge:     Patient: Gagan Juares Patient : 1960   MRN: [id number]  Reason for Admission: There are no discharge diagnoses documented for the most recent discharge. Discharge Date: 18       Spoke with:Mrs. Rosette Miranda     Discharge department/facility: [facility]'s  TCM Interactive Patient Contact:  Was patient able to fill all prescriptions: Yes  Was patient instructed to bring all medications to the follow-up visit: Yes  Is patient taking all medications as directed in the discharge summary?  Yes  Does patient understand their discharge instructions: Yes  Does patient have questions or concerns that need addressed prior to 7-14 day follow up office visit: yes -     Scheduled appointment with PCP within 7-14 days    Follow Up  Future Appointments  Date Time Provider Sofia Ibrahim   2018 8:45 AM MIKALA Jena 11   2018 1:15 PM Eliot Bush MD [address] [place] [address]   2018 10:45 AM Osman Amin, 8 [facility] [address] [place] [address]

## 2018-09-27 LAB
BLOOD CULTURE, ROUTINE: NORMAL
CULTURE, BLOOD 2: NORMAL
EKG ATRIAL RATE: 54 BPM
EKG ATRIAL RATE: 58 BPM
EKG ATRIAL RATE: 60 BPM
EKG P AXIS: 24 DEGREES
EKG P AXIS: 56 DEGREES
EKG P AXIS: 69 DEGREES
EKG P-R INTERVAL: 116 MS
EKG P-R INTERVAL: 122 MS
EKG P-R INTERVAL: 122 MS
EKG Q-T INTERVAL: 430 MS
EKG Q-T INTERVAL: 432 MS
EKG Q-T INTERVAL: 450 MS
EKG QRS DURATION: 100 MS
EKG QRS DURATION: 90 MS
EKG QRS DURATION: 96 MS
EKG QTC CALCULATION (BAZETT): 422 MS
EKG QTC CALCULATION (BAZETT): 426 MS
EKG QTC CALCULATION (BAZETT): 432 MS
EKG R AXIS: 15 DEGREES
EKG R AXIS: 50 DEGREES
EKG R AXIS: 74 DEGREES
EKG T AXIS: -28 DEGREES
EKG T AXIS: 77 DEGREES
EKG T AXIS: 82 DEGREES
EKG VENTRICULAR RATE: 54 BPM
EKG VENTRICULAR RATE: 58 BPM
EKG VENTRICULAR RATE: 60 BPM

## 2018-09-27 PROCEDURE — 93010 ELECTROCARDIOGRAM REPORT: CPT | Performed by: INTERNAL MEDICINE

## 2018-09-28 ENCOUNTER — TELEPHONE (OUTPATIENT)
Dept: CARDIOLOGY CLINIC | Age: 58
End: 2018-09-28

## 2018-09-28 ENCOUNTER — HOSPITAL ENCOUNTER (OUTPATIENT)
Age: 58
Discharge: HOME | End: 2018-09-30
Payer: MEDICAID

## 2018-09-28 ENCOUNTER — OFFICE VISIT (OUTPATIENT)
Dept: FAMILY MEDICINE CLINIC | Age: 58
End: 2018-09-28
Payer: MEDICAID

## 2018-09-28 VITALS
OXYGEN SATURATION: 96 % | DIASTOLIC BLOOD PRESSURE: 64 MMHG | TEMPERATURE: 97.6 F | HEIGHT: 57 IN | BODY MASS INDEX: 40.56 KG/M2 | WEIGHT: 188 LBS | HEART RATE: 87 BPM | SYSTOLIC BLOOD PRESSURE: 124 MMHG

## 2018-09-28 DIAGNOSIS — R07.9 CHEST PAIN, UNSPECIFIED TYPE: ICD-10-CM

## 2018-09-28 DIAGNOSIS — N28.9 IMPAIRED RENAL FUNCTION: ICD-10-CM

## 2018-09-28 DIAGNOSIS — I21.4 NON-STEMI (NON-ST ELEVATED MYOCARDIAL INFARCTION) (HCC): Primary | ICD-10-CM

## 2018-09-28 LAB
ANION GAP SERPL CALCULATED.3IONS-SCNC: 17 MMOL/L (ref 7–16)
BUN BLDV-MCNC: 17 MG/DL (ref 6–20)
CALCIUM SERPL-MCNC: 10 MG/DL (ref 8.6–10.2)
CHLORIDE BLD-SCNC: 99 MMOL/L (ref 98–107)
CO2: 25 MMOL/L (ref 22–29)
CREAT SERPL-MCNC: 0.9 MG/DL (ref 0.5–1)
GFR AFRICAN AMERICAN: >60
GFR NON-AFRICAN AMERICAN: >60 ML/MIN/1.73
GLUCOSE BLD-MCNC: 112 MG/DL (ref 74–109)
HCT VFR BLD CALC: 45.4 % (ref 34–48)
HEMOGLOBIN: 14.4 G/DL (ref 11.5–15.5)
MCH RBC QN AUTO: 30.2 PG (ref 26–35)
MCHC RBC AUTO-ENTMCNC: 31.7 % (ref 32–34.5)
MCV RBC AUTO: 95.2 FL (ref 80–99.9)
PDW BLD-RTO: 13.9 FL (ref 11.5–15)
PLATELET # BLD: 335 E9/L (ref 130–450)
PMV BLD AUTO: 11.5 FL (ref 7–12)
POTASSIUM SERPL-SCNC: 5.5 MMOL/L (ref 3.5–5)
RBC # BLD: 4.77 E12/L (ref 3.5–5.5)
SODIUM BLD-SCNC: 141 MMOL/L (ref 132–146)
WBC # BLD: 12.5 E9/L (ref 4.5–11.5)

## 2018-09-28 PROCEDURE — 93000 ELECTROCARDIOGRAM COMPLETE: CPT | Performed by: NURSE PRACTITIONER

## 2018-09-28 PROCEDURE — 3017F COLORECTAL CA SCREEN DOC REV: CPT | Performed by: NURSE PRACTITIONER

## 2018-09-28 PROCEDURE — G8417 CALC BMI ABV UP PARAM F/U: HCPCS | Performed by: NURSE PRACTITIONER

## 2018-09-28 PROCEDURE — 80048 BASIC METABOLIC PNL TOTAL CA: CPT

## 2018-09-28 PROCEDURE — G8598 ASA/ANTIPLAT THER USED: HCPCS | Performed by: NURSE PRACTITIONER

## 2018-09-28 PROCEDURE — 1036F TOBACCO NON-USER: CPT | Performed by: NURSE PRACTITIONER

## 2018-09-28 PROCEDURE — 1111F DSCHRG MED/CURRENT MED MERGE: CPT | Performed by: NURSE PRACTITIONER

## 2018-09-28 PROCEDURE — G8427 DOCREV CUR MEDS BY ELIG CLIN: HCPCS | Performed by: NURSE PRACTITIONER

## 2018-09-28 PROCEDURE — 36415 COLL VENOUS BLD VENIPUNCTURE: CPT | Performed by: NURSE PRACTITIONER

## 2018-09-28 PROCEDURE — 85027 COMPLETE CBC AUTOMATED: CPT

## 2018-09-28 PROCEDURE — 99214 OFFICE O/P EST MOD 30 MIN: CPT | Performed by: NURSE PRACTITIONER

## 2018-09-28 NOTE — PROGRESS NOTES
Chief Complaint   Patient presents with    Care Management     TRANS CARE VISIT: pt D/C from hospital on 9/25/18, was admitted x3 days for non-stemi     Referral - General     pt would like referral to new cardiologist       HPI:  Patient presents today for  Follow up of ED from 9/22/18 for chest pain, Chest heaviness. She reports that she thought that at the time of her admission she had pneumonia. While she was there - her troponin's peaked at 0.60. She was also found to have a stemi per her EKG. She reports that she still feels very weak, tired. She also reports that she has had episodes of chest pain since she has been home. She denies chest pain at this time. She reports that she did quit smoking at that time. She was placed on imdur, norvasc, plavix and atorvastatin. She is concerned about the results of her heart catheterization as she was told that there is not much that can be done for the blockages that she does have. Prior to Visit Medications    Medication Sig Taking?  Authorizing Provider   isosorbide mononitrate (IMDUR) 60 MG extended release tablet Take 1 tablet by mouth daily Yes Selin Ramirez MD   atorvastatin (LIPITOR) 80 MG tablet Take 1 tablet by mouth nightly Yes Selin Ramirez MD   amLODIPine (NORVASC) 10 MG tablet Take 1 tablet by mouth daily Yes Selin Ramirez MD   clopidogrel (PLAVIX) 75 MG tablet Take 1 tablet by mouth daily Yes Selin Ramirez MD   etanercept (ENBREL) 50 MG/ML injection  Yes Historical Provider, MD   azelastine (ASTELIN) 0.1 % nasal spray 2 sprays by Nasal route 2 times daily Use in each nostril as directed Yes Natty Madrid MD   losartan (COZAAR) 50 MG tablet Take 1 tablet by mouth daily Yes Natty Madrid MD   metoprolol succinate (TOPROL XL) 100 MG extended release tablet TAKE 1 TABLET BY MOUTH DAILY Yes Natty Madrid MD   meloxicam (MOBIC) 15 MG tablet TAKE 1 TABLET BY MOUTH EVERY DAY Yes Natty Madrid MD   omeprazole (PRILOSEC) 20 MG delayed release capsule

## 2018-09-28 NOTE — TELEPHONE ENCOUNTER
Pt was recently released from the hospital.  Needs a hfu with Dr. Cathy Lilly. Pt also saw Dr. Ami Kirby.

## 2018-09-29 ENCOUNTER — TELEPHONE (OUTPATIENT)
Dept: ENT CLINIC | Age: 58
End: 2018-09-29

## 2018-10-01 ENCOUNTER — TELEPHONE (OUTPATIENT)
Dept: FAMILY MEDICINE CLINIC | Age: 58
End: 2018-10-01

## 2018-10-01 ENCOUNTER — APPOINTMENT (OUTPATIENT)
Dept: GENERAL RADIOLOGY | Age: 58
End: 2018-10-01
Payer: MEDICARE

## 2018-10-01 ENCOUNTER — HOSPITAL ENCOUNTER (OUTPATIENT)
Age: 58
Setting detail: OBSERVATION
Discharge: HOME | End: 2018-10-02
Attending: EMERGENCY MEDICINE | Admitting: INTERNAL MEDICINE
Payer: MEDICARE

## 2018-10-01 DIAGNOSIS — R07.9 CHEST PAIN, UNSPECIFIED TYPE: Primary | ICD-10-CM

## 2018-10-01 PROBLEM — I20.0 UNSTABLE ANGINA (HCC): Status: ACTIVE | Noted: 2018-10-01

## 2018-10-01 PROBLEM — I21.4 NON-STEMI (NON-ST ELEVATED MYOCARDIAL INFARCTION) (HCC): Status: RESOLVED | Noted: 2018-09-23 | Resolved: 2018-10-01

## 2018-10-01 LAB
ALBUMIN SERPL-MCNC: 4.3 G/DL (ref 3.5–5.2)
ALP BLD-CCNC: 100 U/L (ref 35–104)
ALT SERPL-CCNC: 112 U/L (ref 0–32)
ANION GAP SERPL CALCULATED.3IONS-SCNC: 15 MMOL/L (ref 7–16)
AST SERPL-CCNC: 62 U/L (ref 0–31)
BASOPHILS ABSOLUTE: 0.12 E9/L (ref 0–0.2)
BASOPHILS RELATIVE PERCENT: 1 % (ref 0–2)
BILIRUB SERPL-MCNC: 0.4 MG/DL (ref 0–1.2)
BUN BLDV-MCNC: 16 MG/DL (ref 6–20)
CALCIUM SERPL-MCNC: 9.6 MG/DL (ref 8.6–10.2)
CHLORIDE BLD-SCNC: 101 MMOL/L (ref 98–107)
CO2: 25 MMOL/L (ref 22–29)
CREAT SERPL-MCNC: 0.8 MG/DL (ref 0.5–1)
EKG ATRIAL RATE: 92 BPM
EKG P AXIS: 72 DEGREES
EKG P-R INTERVAL: 98 MS
EKG Q-T INTERVAL: 394 MS
EKG QRS DURATION: 80 MS
EKG QTC CALCULATION (BAZETT): 487 MS
EKG R AXIS: 47 DEGREES
EKG T AXIS: -23 DEGREES
EKG VENTRICULAR RATE: 92 BPM
EOSINOPHILS ABSOLUTE: 0.4 E9/L (ref 0.05–0.5)
EOSINOPHILS RELATIVE PERCENT: 3.2 % (ref 0–6)
GFR AFRICAN AMERICAN: >60
GFR NON-AFRICAN AMERICAN: >60 ML/MIN/1.73
GLUCOSE BLD-MCNC: 121 MG/DL (ref 74–109)
HCT VFR BLD CALC: 45 % (ref 34–48)
HEMOGLOBIN: 14.8 G/DL (ref 11.5–15.5)
IMMATURE GRANULOCYTES #: 0.04 E9/L
IMMATURE GRANULOCYTES %: 0.3 % (ref 0–5)
LYMPHOCYTES ABSOLUTE: 3.31 E9/L (ref 1.5–4)
LYMPHOCYTES RELATIVE PERCENT: 26.8 % (ref 20–42)
MCH RBC QN AUTO: 29.7 PG (ref 26–35)
MCHC RBC AUTO-ENTMCNC: 32.9 % (ref 32–34.5)
MCV RBC AUTO: 90.2 FL (ref 80–99.9)
MONOCYTES ABSOLUTE: 1.73 E9/L (ref 0.1–0.95)
MONOCYTES RELATIVE PERCENT: 14 % (ref 2–12)
NEUTROPHILS ABSOLUTE: 6.73 E9/L (ref 1.8–7.3)
NEUTROPHILS RELATIVE PERCENT: 54.7 % (ref 43–80)
PDW BLD-RTO: 13.4 FL (ref 11.5–15)
PLATELET # BLD: 349 E9/L (ref 130–450)
PMV BLD AUTO: 10.7 FL (ref 7–12)
POTASSIUM SERPL-SCNC: 3.4 MMOL/L (ref 3.5–5)
RBC # BLD: 4.99 E12/L (ref 3.5–5.5)
SODIUM BLD-SCNC: 141 MMOL/L (ref 132–146)
TOTAL PROTEIN: 7.6 G/DL (ref 6.4–8.3)
TROPONIN: <0.01 NG/ML (ref 0–0.03)
WBC # BLD: 12.3 E9/L (ref 4.5–11.5)

## 2018-10-01 PROCEDURE — 71046 X-RAY EXAM CHEST 2 VIEWS: CPT

## 2018-10-01 PROCEDURE — 85025 COMPLETE CBC W/AUTO DIFF WBC: CPT

## 2018-10-01 PROCEDURE — 6360000002 HC RX W HCPCS: Performed by: INTERNAL MEDICINE

## 2018-10-01 PROCEDURE — 84484 ASSAY OF TROPONIN QUANT: CPT

## 2018-10-01 PROCEDURE — 6370000000 HC RX 637 (ALT 250 FOR IP): Performed by: INTERNAL MEDICINE

## 2018-10-01 PROCEDURE — 93005 ELECTROCARDIOGRAM TRACING: CPT | Performed by: NURSE PRACTITIONER

## 2018-10-01 PROCEDURE — 99285 EMERGENCY DEPT VISIT HI MDM: CPT

## 2018-10-01 PROCEDURE — 2060000000 HC ICU INTERMEDIATE R&B

## 2018-10-01 PROCEDURE — 80053 COMPREHEN METABOLIC PANEL: CPT

## 2018-10-01 PROCEDURE — 36415 COLL VENOUS BLD VENIPUNCTURE: CPT

## 2018-10-01 RX ORDER — ACETAMINOPHEN 325 MG/1
650 TABLET ORAL ONCE
Status: COMPLETED | OUTPATIENT
Start: 2018-10-01 | End: 2018-10-01

## 2018-10-01 RX ORDER — OXYCODONE HYDROCHLORIDE 5 MG/1
5 TABLET ORAL ONCE
Status: COMPLETED | OUTPATIENT
Start: 2018-10-01 | End: 2018-10-01

## 2018-10-01 RX ORDER — POTASSIUM CHLORIDE 20 MEQ/1
40 TABLET, EXTENDED RELEASE ORAL ONCE
Status: COMPLETED | OUTPATIENT
Start: 2018-10-01 | End: 2018-10-01

## 2018-10-01 RX ORDER — OMEPRAZOLE 40 MG/1
CAPSULE, DELAYED RELEASE ORAL
Qty: 30 CAPSULE | Refills: 0 | Status: SHIPPED | OUTPATIENT
Start: 2018-10-01 | End: 2018-11-01 | Stop reason: DRUGHIGH

## 2018-10-01 RX ADMIN — ACETAMINOPHEN 650 MG: 325 TABLET, FILM COATED ORAL at 20:16

## 2018-10-01 RX ADMIN — POTASSIUM CHLORIDE 40 MEQ: 20 TABLET, EXTENDED RELEASE ORAL at 22:21

## 2018-10-01 RX ADMIN — TRIMETHOBENZAMIDE HYDROCHLORIDE 200 MG: 100 INJECTION INTRAMUSCULAR at 22:55

## 2018-10-01 RX ADMIN — OXYCODONE HYDROCHLORIDE 5 MG: 5 TABLET ORAL at 22:21

## 2018-10-01 ASSESSMENT — ENCOUNTER SYMPTOMS
COUGH: 0
SINUS PRESSURE: 0
SHORTNESS OF BREATH: 0
DIARRHEA: 0
PHOTOPHOBIA: 0
CONSTIPATION: 0
ABDOMINAL PAIN: 0
SORE THROAT: 1
NAUSEA: 0

## 2018-10-01 ASSESSMENT — PAIN DESCRIPTION - FREQUENCY: FREQUENCY: CONTINUOUS

## 2018-10-01 ASSESSMENT — PAIN SCALES - GENERAL
PAINLEVEL_OUTOF10: 9
PAINLEVEL_OUTOF10: 0
PAINLEVEL_OUTOF10: 9
PAINLEVEL_OUTOF10: 9

## 2018-10-01 ASSESSMENT — PAIN DESCRIPTION - LOCATION: LOCATION: CHEST

## 2018-10-01 ASSESSMENT — PAIN DESCRIPTION - ORIENTATION: ORIENTATION: LEFT

## 2018-10-01 ASSESSMENT — PAIN DESCRIPTION - ONSET: ONSET: ON-GOING

## 2018-10-01 ASSESSMENT — PAIN DESCRIPTION - PAIN TYPE: TYPE: ACUTE PAIN

## 2018-10-01 ASSESSMENT — PAIN DESCRIPTION - DESCRIPTORS: DESCRIPTORS: SHARP

## 2018-10-01 NOTE — TELEPHONE ENCOUNTER
1st attempt to contact pt for lab results, lm for pt to contact office.      Electronically signed by Yara Kelley MA on 10/1/2018 at 2:20 PM

## 2018-10-02 VITALS
HEIGHT: 57 IN | HEART RATE: 92 BPM | WEIGHT: 179.8 LBS | SYSTOLIC BLOOD PRESSURE: 125 MMHG | BODY MASS INDEX: 38.79 KG/M2 | DIASTOLIC BLOOD PRESSURE: 74 MMHG | OXYGEN SATURATION: 93 % | RESPIRATION RATE: 18 BRPM | TEMPERATURE: 96.7 F

## 2018-10-02 PROBLEM — R07.9 CHEST PAIN: Status: ACTIVE | Noted: 2018-10-02

## 2018-10-02 LAB
ANION GAP SERPL CALCULATED.3IONS-SCNC: 18 MMOL/L (ref 7–16)
BUN BLDV-MCNC: 16 MG/DL (ref 6–20)
CALCIUM SERPL-MCNC: 9.2 MG/DL (ref 8.6–10.2)
CHLORIDE BLD-SCNC: 98 MMOL/L (ref 98–107)
CK MB: 2 NG/ML (ref 0–4.3)
CK MB: 2 NG/ML (ref 0–4.3)
CO2: 26 MMOL/L (ref 22–29)
CREAT SERPL-MCNC: 0.9 MG/DL (ref 0.5–1)
GFR AFRICAN AMERICAN: >60
GFR NON-AFRICAN AMERICAN: >60 ML/MIN/1.73
GLUCOSE BLD-MCNC: 111 MG/DL (ref 74–109)
HCT VFR BLD CALC: 43.1 % (ref 34–48)
HEMOGLOBIN: 14.1 G/DL (ref 11.5–15.5)
MCH RBC QN AUTO: 30.2 PG (ref 26–35)
MCHC RBC AUTO-ENTMCNC: 32.7 % (ref 32–34.5)
MCV RBC AUTO: 92.3 FL (ref 80–99.9)
PDW BLD-RTO: 13.6 FL (ref 11.5–15)
PLATELET # BLD: 336 E9/L (ref 130–450)
PMV BLD AUTO: 10.8 FL (ref 7–12)
POTASSIUM SERPL-SCNC: 3.5 MMOL/L (ref 3.5–5)
PROCALCITONIN: 0.04 NG/ML (ref 0–0.08)
RBC # BLD: 4.67 E12/L (ref 3.5–5.5)
SODIUM BLD-SCNC: 142 MMOL/L (ref 132–146)
TOTAL CK: 109 U/L (ref 20–180)
TOTAL CK: 109 U/L (ref 20–180)
TROPONIN: <0.01 NG/ML (ref 0–0.03)
TROPONIN: <0.01 NG/ML (ref 0–0.03)
WBC # BLD: 10.1 E9/L (ref 4.5–11.5)

## 2018-10-02 PROCEDURE — 2580000003 HC RX 258: Performed by: INTERNAL MEDICINE

## 2018-10-02 PROCEDURE — 6360000002 HC RX W HCPCS: Performed by: INTERNAL MEDICINE

## 2018-10-02 PROCEDURE — 96372 THER/PROPH/DIAG INJ SC/IM: CPT

## 2018-10-02 PROCEDURE — 82553 CREATINE MB FRACTION: CPT

## 2018-10-02 PROCEDURE — 6370000000 HC RX 637 (ALT 250 FOR IP): Performed by: INTERNAL MEDICINE

## 2018-10-02 PROCEDURE — 36415 COLL VENOUS BLD VENIPUNCTURE: CPT

## 2018-10-02 PROCEDURE — G0378 HOSPITAL OBSERVATION PER HR: HCPCS

## 2018-10-02 PROCEDURE — 85027 COMPLETE CBC AUTOMATED: CPT

## 2018-10-02 PROCEDURE — 84484 ASSAY OF TROPONIN QUANT: CPT

## 2018-10-02 PROCEDURE — 99223 1ST HOSP IP/OBS HIGH 75: CPT | Performed by: INTERNAL MEDICINE

## 2018-10-02 PROCEDURE — 82550 ASSAY OF CK (CPK): CPT

## 2018-10-02 PROCEDURE — 84145 PROCALCITONIN (PCT): CPT

## 2018-10-02 PROCEDURE — 80048 BASIC METABOLIC PNL TOTAL CA: CPT

## 2018-10-02 RX ORDER — CLOPIDOGREL BISULFATE 75 MG/1
75 TABLET ORAL DAILY
Status: DISCONTINUED | OUTPATIENT
Start: 2018-10-02 | End: 2018-10-02 | Stop reason: HOSPADM

## 2018-10-02 RX ORDER — ASPIRIN 81 MG/1
81 TABLET, CHEWABLE ORAL DAILY
Status: DISCONTINUED | OUTPATIENT
Start: 2018-10-02 | End: 2018-10-02 | Stop reason: HOSPADM

## 2018-10-02 RX ORDER — BUPROPION HYDROCHLORIDE 150 MG/1
150 TABLET, EXTENDED RELEASE ORAL 2 TIMES DAILY
Status: DISCONTINUED | OUTPATIENT
Start: 2018-10-02 | End: 2018-10-02 | Stop reason: HOSPADM

## 2018-10-02 RX ORDER — HYDROCODONE BITARTRATE AND ACETAMINOPHEN 5; 325 MG/1; MG/1
1 TABLET ORAL EVERY 6 HOURS PRN
Status: DISCONTINUED | OUTPATIENT
Start: 2018-10-02 | End: 2018-10-02 | Stop reason: HOSPADM

## 2018-10-02 RX ORDER — MIRTAZAPINE 15 MG/1
30 TABLET, FILM COATED ORAL NIGHTLY
Status: DISCONTINUED | OUTPATIENT
Start: 2018-10-02 | End: 2018-10-02 | Stop reason: HOSPADM

## 2018-10-02 RX ORDER — RANOLAZINE 500 MG/1
500 TABLET, EXTENDED RELEASE ORAL 2 TIMES DAILY
Qty: 60 TABLET | Refills: 3 | Status: SHIPPED | OUTPATIENT
Start: 2018-10-02 | End: 2020-11-19 | Stop reason: DRUGHIGH

## 2018-10-02 RX ORDER — NITROGLYCERIN 0.4 MG/1
TABLET SUBLINGUAL
Qty: 25 TABLET | Refills: 3 | Status: SHIPPED | OUTPATIENT
Start: 2018-10-02 | End: 2021-12-22 | Stop reason: CLARIF

## 2018-10-02 RX ORDER — FUROSEMIDE 20 MG/1
20 TABLET ORAL DAILY
Status: DISCONTINUED | OUTPATIENT
Start: 2018-10-02 | End: 2018-10-02 | Stop reason: HOSPADM

## 2018-10-02 RX ORDER — METOPROLOL SUCCINATE 100 MG/1
100 TABLET, EXTENDED RELEASE ORAL DAILY
Status: DISCONTINUED | OUTPATIENT
Start: 2018-10-02 | End: 2018-10-02 | Stop reason: HOSPADM

## 2018-10-02 RX ORDER — SODIUM CHLORIDE 0.9 % (FLUSH) 0.9 %
10 SYRINGE (ML) INJECTION PRN
Status: DISCONTINUED | OUTPATIENT
Start: 2018-10-02 | End: 2018-10-02 | Stop reason: HOSPADM

## 2018-10-02 RX ORDER — NITROGLYCERIN 0.4 MG/1
0.4 TABLET SUBLINGUAL EVERY 5 MIN PRN
Status: DISCONTINUED | OUTPATIENT
Start: 2018-10-02 | End: 2018-10-02 | Stop reason: HOSPADM

## 2018-10-02 RX ORDER — ISOSORBIDE MONONITRATE 60 MG/1
60 TABLET, EXTENDED RELEASE ORAL DAILY
Status: DISCONTINUED | OUTPATIENT
Start: 2018-10-02 | End: 2018-10-02

## 2018-10-02 RX ORDER — ACETAMINOPHEN 325 MG/1
650 TABLET ORAL EVERY 4 HOURS PRN
Status: DISCONTINUED | OUTPATIENT
Start: 2018-10-02 | End: 2018-10-02 | Stop reason: HOSPADM

## 2018-10-02 RX ORDER — SODIUM CHLORIDE 0.9 % (FLUSH) 0.9 %
10 SYRINGE (ML) INJECTION EVERY 12 HOURS SCHEDULED
Status: DISCONTINUED | OUTPATIENT
Start: 2018-10-02 | End: 2018-10-02 | Stop reason: HOSPADM

## 2018-10-02 RX ORDER — ASPIRIN 81 MG/1
81 TABLET, CHEWABLE ORAL DAILY
Qty: 30 TABLET | Refills: 3 | Status: SHIPPED | OUTPATIENT
Start: 2018-10-03 | End: 2019-03-21 | Stop reason: SDUPTHER

## 2018-10-02 RX ORDER — PREGABALIN 100 MG/1
100 CAPSULE ORAL 2 TIMES DAILY
Status: DISCONTINUED | OUTPATIENT
Start: 2018-10-02 | End: 2018-10-02 | Stop reason: HOSPADM

## 2018-10-02 RX ORDER — CHLORTHALIDONE 25 MG/1
25 TABLET ORAL DAILY
Status: DISCONTINUED | OUTPATIENT
Start: 2018-10-02 | End: 2018-10-02 | Stop reason: HOSPADM

## 2018-10-02 RX ORDER — POTASSIUM CHLORIDE 20 MEQ/1
20 TABLET, EXTENDED RELEASE ORAL
Status: DISCONTINUED | OUTPATIENT
Start: 2018-10-02 | End: 2018-10-02 | Stop reason: HOSPADM

## 2018-10-02 RX ORDER — RANOLAZINE 500 MG/1
500 TABLET, EXTENDED RELEASE ORAL 2 TIMES DAILY
Status: DISCONTINUED | OUTPATIENT
Start: 2018-10-02 | End: 2018-10-02 | Stop reason: HOSPADM

## 2018-10-02 RX ORDER — ZOLPIDEM TARTRATE 5 MG/1
10 TABLET ORAL NIGHTLY PRN
Status: DISCONTINUED | OUTPATIENT
Start: 2018-10-02 | End: 2018-10-02 | Stop reason: HOSPADM

## 2018-10-02 RX ORDER — AMLODIPINE BESYLATE 10 MG/1
10 TABLET ORAL DAILY
Status: DISCONTINUED | OUTPATIENT
Start: 2018-10-02 | End: 2018-10-02 | Stop reason: HOSPADM

## 2018-10-02 RX ORDER — PANTOPRAZOLE SODIUM 40 MG/1
40 TABLET, DELAYED RELEASE ORAL
Status: DISCONTINUED | OUTPATIENT
Start: 2018-10-02 | End: 2018-10-02 | Stop reason: HOSPADM

## 2018-10-02 RX ORDER — ATORVASTATIN CALCIUM 40 MG/1
80 TABLET, FILM COATED ORAL NIGHTLY
Status: DISCONTINUED | OUTPATIENT
Start: 2018-10-02 | End: 2018-10-02 | Stop reason: HOSPADM

## 2018-10-02 RX ORDER — FUROSEMIDE 20 MG/1
20 TABLET ORAL DAILY
Qty: 60 TABLET | Refills: 3 | Status: SHIPPED | OUTPATIENT
Start: 2018-10-03 | End: 2018-11-01

## 2018-10-02 RX ORDER — LOSARTAN POTASSIUM 50 MG/1
50 TABLET ORAL DAILY
Status: DISCONTINUED | OUTPATIENT
Start: 2018-10-02 | End: 2018-10-02 | Stop reason: HOSPADM

## 2018-10-02 RX ADMIN — METOPROLOL SUCCINATE 100 MG: 100 TABLET, FILM COATED, EXTENDED RELEASE ORAL at 08:52

## 2018-10-02 RX ADMIN — CHLORTHALIDONE 25 MG: 25 TABLET ORAL at 08:53

## 2018-10-02 RX ADMIN — ACETAMINOPHEN 650 MG: 325 TABLET, FILM COATED ORAL at 08:52

## 2018-10-02 RX ADMIN — BUPROPION HYDROCHLORIDE 150 MG: 150 TABLET, EXTENDED RELEASE ORAL at 08:52

## 2018-10-02 RX ADMIN — PREGABALIN 100 MG: 100 CAPSULE ORAL at 02:23

## 2018-10-02 RX ADMIN — RANOLAZINE 500 MG: 500 TABLET, FILM COATED, EXTENDED RELEASE ORAL at 08:53

## 2018-10-02 RX ADMIN — POTASSIUM CHLORIDE 20 MEQ: 20 TABLET, EXTENDED RELEASE ORAL at 08:52

## 2018-10-02 RX ADMIN — AMLODIPINE BESYLATE 10 MG: 10 TABLET ORAL at 08:52

## 2018-10-02 RX ADMIN — CLOPIDOGREL 75 MG: 75 TABLET, FILM COATED ORAL at 08:53

## 2018-10-02 RX ADMIN — Medication 10 ML: at 08:53

## 2018-10-02 RX ADMIN — ASPIRIN 81 MG CHEWABLE TABLET 81 MG: 81 TABLET CHEWABLE at 08:52

## 2018-10-02 RX ADMIN — BUPROPION HYDROCHLORIDE 150 MG: 150 TABLET, EXTENDED RELEASE ORAL at 02:21

## 2018-10-02 RX ADMIN — MIRTAZAPINE 30 MG: 15 TABLET, FILM COATED ORAL at 02:21

## 2018-10-02 RX ADMIN — ENOXAPARIN SODIUM 40 MG: 40 INJECTION SUBCUTANEOUS at 08:51

## 2018-10-02 RX ADMIN — PANTOPRAZOLE SODIUM 40 MG: 40 TABLET, DELAYED RELEASE ORAL at 06:01

## 2018-10-02 RX ADMIN — HYDROCODONE BITARTRATE AND ACETAMINOPHEN 1 TABLET: 5; 325 TABLET ORAL at 12:38

## 2018-10-02 RX ADMIN — PREGABALIN 100 MG: 100 CAPSULE ORAL at 08:52

## 2018-10-02 RX ADMIN — FUROSEMIDE 20 MG: 20 TABLET ORAL at 12:38

## 2018-10-02 RX ADMIN — LOSARTAN POTASSIUM 50 MG: 50 TABLET, FILM COATED ORAL at 08:52

## 2018-10-02 RX ADMIN — DESMOPRESSIN ACETATE 80 MG: 0.2 TABLET ORAL at 02:21

## 2018-10-02 ASSESSMENT — PAIN DESCRIPTION - PROGRESSION: CLINICAL_PROGRESSION: GRADUALLY WORSENING

## 2018-10-02 ASSESSMENT — PAIN SCALES - GENERAL
PAINLEVEL_OUTOF10: 8
PAINLEVEL_OUTOF10: 9
PAINLEVEL_OUTOF10: 6
PAINLEVEL_OUTOF10: 8

## 2018-10-02 ASSESSMENT — PAIN DESCRIPTION - ORIENTATION: ORIENTATION: LOWER;MID

## 2018-10-02 ASSESSMENT — PAIN DESCRIPTION - DESCRIPTORS: DESCRIPTORS: CONSTANT;ACHING;DISCOMFORT

## 2018-10-02 ASSESSMENT — PAIN DESCRIPTION - ONSET: ONSET: ON-GOING

## 2018-10-02 ASSESSMENT — PAIN DESCRIPTION - LOCATION: LOCATION: BACK

## 2018-10-02 ASSESSMENT — PAIN DESCRIPTION - FREQUENCY: FREQUENCY: CONTINUOUS

## 2018-10-02 ASSESSMENT — PAIN DESCRIPTION - PAIN TYPE: TYPE: CHRONIC PAIN

## 2018-10-02 NOTE — ED PROVIDER NOTES
Musculoskeletal: Negative for neck pain and neck stiffness. Skin: Negative for rash and wound. Neurological: Positive for headaches. Negative for syncope and numbness. Psychiatric/Behavioral: Negative for behavioral problems and confusion. All other systems reviewed and are negative. Physical Exam   Constitutional: She is oriented to person, place, and time. She appears well-developed and well-nourished. No distress. HENT:   Head: Normocephalic and atraumatic. Eyes: Pupils are equal, round, and reactive to light. EOM are normal.   Neck: Normal range of motion. Neck supple. No thyromegaly present. Cardiovascular: Normal rate, regular rhythm, normal heart sounds and intact distal pulses. No murmur heard. Pulmonary/Chest: Effort normal and breath sounds normal. No respiratory distress. She has no wheezes. Abdominal: Soft. Bowel sounds are normal. She exhibits no distension. There is no tenderness. Musculoskeletal: Normal range of motion. She exhibits no edema or tenderness. Neurological: She is alert and oriented to person, place, and time. She has normal reflexes. No cranial nerve deficit. Skin: Skin is warm and dry. Nursing note and vitals reviewed. Procedures    MDM  Number of Diagnoses or Management Options  Chest pain, unspecified type:   Diagnosis management comments: Diane Pearl is a 65 yo Female with PMHx significant for HTN, HLD, arthritis and recent admission for NSTEMI s/p Cath (09/22/2018) presented to ED with CC of worsening chest pain and headache over the past couple of days. Her initial troponin <0.01 however, EKG showed new T-wave inversion in leads II, III, V4, deepening of T-wave on V6 comparing to EKG on 09/28/2018. However, still complains of active chest pain and headache. Tylenol 650 mg given which didn't help with the headache. Oxycodone 5 mg given. Due to her extensive cardiac issues, active chest pain and new EKG changes.  Decision made to admit her for observation. Cardiology contacted and agree to the plan. Labs    Results for orders placed or performed during the hospital encounter of 10/01/18   CBC auto differential   Result Value Ref Range    WBC 12.3 (H) 4.5 - 11.5 E9/L    RBC 4.99 3.50 - 5.50 E12/L    Hemoglobin 14.8 11.5 - 15.5 g/dL    Hematocrit 45.0 34.0 - 48.0 %    MCV 90.2 80.0 - 99.9 fL    MCH 29.7 26.0 - 35.0 pg    MCHC 32.9 32.0 - 34.5 %    RDW 13.4 11.5 - 15.0 fL    Platelets 399 933 - 501 E9/L    MPV 10.7 7.0 - 12.0 fL    Neutrophils % 54.7 43.0 - 80.0 %    Immature Granulocytes % 0.3 0.0 - 5.0 %    Lymphocytes % 26.8 20.0 - 42.0 %    Monocytes % 14.0 (H) 2.0 - 12.0 %    Eosinophils % 3.2 0.0 - 6.0 %    Basophils % 1.0 0.0 - 2.0 %    Neutrophils # 6.73 1.80 - 7.30 E9/L    Immature Granulocytes # 0.04 E9/L    Lymphocytes # 3.31 1.50 - 4.00 E9/L    Monocytes # 1.73 (H) 0.10 - 0.95 E9/L    Eosinophils # 0.40 0.05 - 0.50 E9/L    Basophils # 0.12 0.00 - 0.20 E9/L   Comprehensive Metabolic Panel   Result Value Ref Range    Sodium 141 132 - 146 mmol/L    Potassium 3.4 (L) 3.5 - 5.0 mmol/L    Chloride 101 98 - 107 mmol/L    CO2 25 22 - 29 mmol/L    Anion Gap 15 7 - 16 mmol/L    Glucose 121 (H) 74 - 109 mg/dL    BUN 16 6 - 20 mg/dL    CREATININE 0.8 0.5 - 1.0 mg/dL    GFR Non-African American >60 >=60 mL/min/1.73    GFR African American >60     Calcium 9.6 8.6 - 10.2 mg/dL    Total Protein 7.6 6.4 - 8.3 g/dL    Alb 4.3 3.5 - 5.2 g/dL    Total Bilirubin 0.4 0.0 - 1.2 mg/dL    Alkaline Phosphatase 100 35 - 104 U/L     (H) 0 - 32 U/L    AST 62 (H) 0 - 31 U/L   Troponin   Result Value Ref Range    Troponin <0.01 0.00 - 0.03 ng/mL   EKG 12 Lead   Result Value Ref Range    Ventricular Rate 92 BPM    Atrial Rate 92 BPM    P-R Interval 98 ms    QRS Duration 80 ms    Q-T Interval 394 ms    QTc Calculation (Bazett) 487 ms    P Axis 72 degrees    R Axis 47 degrees    T Axis -23 degrees         Radiology    EKG Interpretation.   EKG: normal sinus

## 2018-10-02 NOTE — CONSULTS
[address]                   [address] [place],  [facility]                                   CONSULTATION    PATIENT NAME: Angel Patino                    :        1960  MED REC NO:   [id number]                            ROOM:       7090  ACCOUNT NO:   [de-identified]                           ADMIT DATE: 10/01/2018  PROVIDER:     Magda John MD    CONSULT DATE:  10/02/2018    REASON FOR CONSULTATION:  Chest discomfort. HISTORY OF PRESENT ILLNESS:  The patient is a 32-year-old female known to  our group through Dr. Vanesa Wood. She follows with Dr. Tutu Bautista. She  has a past medical history of \"recurrent myocarditis,\" ankylosing  spondylitis, anxiety, depression, and a recent cardiac catheterization. She presented to the hospital on 2018 with chest discomfort. She was  taken to the cardiac catheterization laboratory for a mini troponin bump of  0.41 and then 0.6. Dr. Dragan Keys performed cardiac catheterization which  revealed severe sequential lesions in small caliber, right coronary artery,  along with normal left ventricular systolic function and moderate lesion  and obtuse marginal and circumflex. There was no intervention performed  because of very small caliber of the right coronary artery, and its at  most, codominant status. She was discharged to home on single antiplatelet  therapy as well as amlodipine 10 mg daily, atorvastatin 80 daily, and  isosorbide. She is an extremely difficult historian and claims that she  has had continual discomfort which she describes as a pneumonia-like  sensation over the last week. She also has a severe headache. She  eventually came to the emergency room because of complaints. She is  resting comfortably now, but with continued complaints. PAST MEDICAL/SURGICAL HISTORY:  As outline above.     MEDICATIONS:  Norvasc 10, atorvastatin 80, Wellbutrin 150, Hygroton 25,  Plavix 75 daily, Imdur 60 daily, Cozaar 50 daily, Toprol- daily, and  Lyrica. ALLERGIES:  None known. REVIEW OF SYSTEMS:  See H and P per PCP. FAMILY HISTORY:  Noncontributory secondary to age. SOCIAL HISTORY:  The patient is a nonsmoker, nondrinker. PHYSICAL EXAMINATION:  VITAL SIGNS:  Blood pressure is 140/80, heart rate 88 and regular,  respiratory rate 16, temp 97.5. HEENT:  Normocephalic, atraumatic. Conjunctivae pink. The oral mucosa is  moist.  There is no stridor. Trachea is midline. NECK:  There is no jugular venous distention. No carotid bruits. RESPIRATORY:  The lungs are clear. There are no rales or wheeze. CARDIOVASCULAR:  The apical impulse is nondisplaced. Normal S1, single S2. No obvious murmur, gallop, rub, or click detected. ABDOMEN:  Soft and nontender to palpation. Bowel sounds present. No  palpable masses. MUSCULOSKELETAL:  Good muscle strength and tone. No atrophy or abnormal  movement. SKIN:  Warm and dry. No stasis dermatitis or ulcers. NEUROLOGIC/PSYCHIATRIC:  Oriented to person, place, and time. Speech clear  and appropriate. EKG On 10/01/2018 at 1444:  Sinus rhythm, nonspecific ST-T wave changes;  not substantially different from last week's tracings. LABORATORY DATA:  CK, CK-MB, and troponin x2 negative for acute coronary  event. Potassium 3.4. WBC 10.1, hemoglobin 14.1. Chest x-ray:  No clear cut heart failure. IMPRESSION/PLAN:  The patient is a very difficult historian. She  apparently has had recurrent chest discomfort over the years that has been  described pericarditis/myocarditis by outside cardiology. However, last  week she did make cardiac enzymes which prompted a cardiac catheterization  that revealed severe disease of a very small RCA not only in caliber but in  distribution. I personally reviewed the angiogram.  Appropriately, medical management was only advised.   She  presents now with atypical symptoms unassociated with cardiac

## 2018-10-04 ENCOUNTER — HOSPITAL ENCOUNTER (OUTPATIENT)
Age: 58
Discharge: HOME | End: 2018-10-04
Payer: MEDICARE

## 2018-10-04 LAB
ALBUMIN SERPL-MCNC: 3.8 G/DL (ref 3.5–5.2)
ALP BLD-CCNC: 97 U/L (ref 35–104)
ALT SERPL-CCNC: 53 U/L (ref 0–32)
AST SERPL-CCNC: 26 U/L (ref 0–31)
BILIRUB SERPL-MCNC: 0.3 MG/DL (ref 0–1.2)
BILIRUBIN DIRECT: <0.2 MG/DL (ref 0–0.3)
BILIRUBIN, INDIRECT: ABNORMAL MG/DL (ref 0–1)
TOTAL PROTEIN: 6.7 G/DL (ref 6.4–8.3)

## 2018-10-04 PROCEDURE — 36415 COLL VENOUS BLD VENIPUNCTURE: CPT

## 2018-10-04 PROCEDURE — 80076 HEPATIC FUNCTION PANEL: CPT

## 2018-10-09 DIAGNOSIS — I10 ESSENTIAL HYPERTENSION: ICD-10-CM

## 2018-10-09 RX ORDER — LOSARTAN POTASSIUM 50 MG/1
50 TABLET ORAL DAILY
Qty: 30 TABLET | Refills: 0 | Status: SHIPPED | OUTPATIENT
Start: 2018-10-09 | End: 2018-10-12 | Stop reason: SDUPTHER

## 2018-10-12 ENCOUNTER — OFFICE VISIT (OUTPATIENT)
Dept: FAMILY MEDICINE CLINIC | Age: 58
End: 2018-10-12
Payer: MEDICARE

## 2018-10-12 VITALS
DIASTOLIC BLOOD PRESSURE: 68 MMHG | TEMPERATURE: 98 F | HEART RATE: 55 BPM | OXYGEN SATURATION: 97 % | WEIGHT: 190 LBS | SYSTOLIC BLOOD PRESSURE: 118 MMHG | BODY MASS INDEX: 38.3 KG/M2 | HEIGHT: 59 IN

## 2018-10-12 DIAGNOSIS — I10 HTN (HYPERTENSION), BENIGN: Primary | Chronic | ICD-10-CM

## 2018-10-12 DIAGNOSIS — I25.2 STATUS POST NON-ST ELEVATION MYOCARDIAL INFARCTION (NSTEMI): ICD-10-CM

## 2018-10-12 DIAGNOSIS — J02.9 SORE THROAT: ICD-10-CM

## 2018-10-12 DIAGNOSIS — F32.4 MAJOR DEPRESSIVE DISORDER IN PARTIAL REMISSION, UNSPECIFIED WHETHER RECURRENT (HCC): ICD-10-CM

## 2018-10-12 LAB — S PYO AG THROAT QL: NORMAL

## 2018-10-12 PROCEDURE — G8484 FLU IMMUNIZE NO ADMIN: HCPCS | Performed by: FAMILY MEDICINE

## 2018-10-12 PROCEDURE — 1036F TOBACCO NON-USER: CPT | Performed by: FAMILY MEDICINE

## 2018-10-12 PROCEDURE — 3017F COLORECTAL CA SCREEN DOC REV: CPT | Performed by: FAMILY MEDICINE

## 2018-10-12 PROCEDURE — G8427 DOCREV CUR MEDS BY ELIG CLIN: HCPCS | Performed by: FAMILY MEDICINE

## 2018-10-12 PROCEDURE — G8417 CALC BMI ABV UP PARAM F/U: HCPCS | Performed by: FAMILY MEDICINE

## 2018-10-12 PROCEDURE — 1111F DSCHRG MED/CURRENT MED MERGE: CPT | Performed by: FAMILY MEDICINE

## 2018-10-12 PROCEDURE — 87880 STREP A ASSAY W/OPTIC: CPT | Performed by: FAMILY MEDICINE

## 2018-10-12 PROCEDURE — 99214 OFFICE O/P EST MOD 30 MIN: CPT | Performed by: FAMILY MEDICINE

## 2018-10-12 PROCEDURE — G8598 ASA/ANTIPLAT THER USED: HCPCS | Performed by: FAMILY MEDICINE

## 2018-10-12 RX ORDER — LOSARTAN POTASSIUM 50 MG/1
50 TABLET ORAL DAILY
Qty: 30 TABLET | Refills: 1 | Status: SHIPPED | OUTPATIENT
Start: 2018-10-12 | End: 2018-11-01 | Stop reason: SDUPTHER

## 2018-10-12 RX ORDER — AMLODIPINE BESYLATE 5 MG/1
5 TABLET ORAL DAILY
Qty: 30 TABLET | Refills: 2 | Status: SHIPPED | OUTPATIENT
Start: 2018-10-12 | End: 2018-11-01 | Stop reason: SDUPTHER

## 2018-10-12 RX ORDER — DIPHENHYDRAMINE HYDROCHLORIDE AND LIDOCAINE HYDROCHLORIDE AND ALUMINUM HYDROXIDE AND MAGNESIUM HYDRO
5 KIT 4 TIMES DAILY PRN
Qty: 1 BOTTLE | Refills: 0 | Status: SHIPPED | OUTPATIENT
Start: 2018-10-12 | End: 2018-11-01

## 2018-10-12 NOTE — PROGRESS NOTES
mouthwash qid   F/u if no improvement  -     POCT rapid strep A  -     DPH-Lido-AlHydr-MgHydr-Simeth (MAGIC MOUTHWASH) SUSP; Swish and spit 5 mLs 4 times daily as needed for Irritation    Status post non-ST elevation myocardial infarction (NSTEMI)  Discussed at length with patient medical vs interventions for CAD  Emphasized importance of smoking cessation   Consideration for cardiac rehab - will check with cardiologist to see if she is cleared to participate    Quality & Risk Score Accuracy    Visit Dx:  F41.9, F32.9 - Anxiety and depression  Assessment and plan: The condition is stable based upon exam, symptoms, history and labs. Continue current treatment plan. Follow up in 1 month. Aggravated by recent medical illness, support offered. No medication changes today  Last edited 10/14/18 08:16 EDT by Shaka Tay MD           Return in about 1 month (around 11/12/2018) for cardiac, okay to add. Advised patient to call with any new medication issues. All questions answered.   Call or go to ED immediately if symptoms worsen or persist.

## 2018-10-14 ENCOUNTER — TELEPHONE (OUTPATIENT)
Dept: FAMILY MEDICINE CLINIC | Age: 58
End: 2018-10-14

## 2018-10-14 PROBLEM — I25.2 STATUS POST NON-ST ELEVATION MYOCARDIAL INFARCTION (NSTEMI): Status: ACTIVE | Noted: 2018-10-14

## 2018-10-14 ASSESSMENT — ENCOUNTER SYMPTOMS
CONSTIPATION: 0
WHEEZING: 0
VOMITING: 0
SHORTNESS OF BREATH: 1
DOUBLE VISION: 0
COUGH: 0
BLURRED VISION: 0
NAUSEA: 0
DIARRHEA: 0
SORE THROAT: 1
WHEEZING: 0
COUGH: 0

## 2018-10-15 DIAGNOSIS — I21.9 MYOCARDIAL INFARCTION, UNSPECIFIED MI TYPE, UNSPECIFIED ARTERY (HCC): Primary | ICD-10-CM

## 2018-11-01 ENCOUNTER — OFFICE VISIT (OUTPATIENT)
Dept: FAMILY MEDICINE CLINIC | Age: 58
End: 2018-11-01
Payer: MEDICARE

## 2018-11-01 ENCOUNTER — HOSPITAL ENCOUNTER (OUTPATIENT)
Dept: CARDIAC REHAB | Age: 58
Setting detail: THERAPIES SERIES
Discharge: HOME | End: 2018-11-01
Payer: MEDICARE

## 2018-11-01 VITALS
RESPIRATION RATE: 16 BRPM | WEIGHT: 188.25 LBS | OXYGEN SATURATION: 98 % | HEART RATE: 52 BPM | BODY MASS INDEX: 40.61 KG/M2 | SYSTOLIC BLOOD PRESSURE: 98 MMHG | HEIGHT: 57 IN | DIASTOLIC BLOOD PRESSURE: 62 MMHG

## 2018-11-01 VITALS
TEMPERATURE: 97.7 F | DIASTOLIC BLOOD PRESSURE: 72 MMHG | HEIGHT: 57 IN | BODY MASS INDEX: 41.21 KG/M2 | HEART RATE: 69 BPM | OXYGEN SATURATION: 95 % | SYSTOLIC BLOOD PRESSURE: 124 MMHG | WEIGHT: 191 LBS | RESPIRATION RATE: 16 BRPM

## 2018-11-01 DIAGNOSIS — E66.01 MORBID OBESITY WITH BMI OF 40.0-44.9, ADULT (HCC): ICD-10-CM

## 2018-11-01 DIAGNOSIS — G47.33 OSA (OBSTRUCTIVE SLEEP APNEA): Primary | Chronic | ICD-10-CM

## 2018-11-01 DIAGNOSIS — R05.3 CHRONIC COUGH: ICD-10-CM

## 2018-11-01 DIAGNOSIS — I25.10 CAD IN NATIVE ARTERY: Chronic | ICD-10-CM

## 2018-11-01 DIAGNOSIS — I10 ESSENTIAL HYPERTENSION: ICD-10-CM

## 2018-11-01 PROCEDURE — 1036F TOBACCO NON-USER: CPT | Performed by: FAMILY MEDICINE

## 2018-11-01 PROCEDURE — 3017F COLORECTAL CA SCREEN DOC REV: CPT | Performed by: FAMILY MEDICINE

## 2018-11-01 PROCEDURE — G8417 CALC BMI ABV UP PARAM F/U: HCPCS | Performed by: FAMILY MEDICINE

## 2018-11-01 PROCEDURE — G8484 FLU IMMUNIZE NO ADMIN: HCPCS | Performed by: FAMILY MEDICINE

## 2018-11-01 PROCEDURE — 99213 OFFICE O/P EST LOW 20 MIN: CPT | Performed by: FAMILY MEDICINE

## 2018-11-01 PROCEDURE — G8598 ASA/ANTIPLAT THER USED: HCPCS | Performed by: FAMILY MEDICINE

## 2018-11-01 PROCEDURE — G8427 DOCREV CUR MEDS BY ELIG CLIN: HCPCS | Performed by: FAMILY MEDICINE

## 2018-11-01 RX ORDER — VARENICLINE TARTRATE 1 MG/1
1 TABLET, FILM COATED ORAL 2 TIMES DAILY
Qty: 60 TABLET | Refills: 2 | Status: CANCELLED | OUTPATIENT
Start: 2018-11-01

## 2018-11-01 RX ORDER — ACETAMINOPHEN 160 MG
2000 TABLET,DISINTEGRATING ORAL DAILY
Qty: 30 CAPSULE | Refills: 5 | Status: SHIPPED | OUTPATIENT
Start: 2018-11-01 | End: 2019-03-21 | Stop reason: SDUPTHER

## 2018-11-01 RX ORDER — OMEPRAZOLE 20 MG/1
20 CAPSULE, DELAYED RELEASE ORAL DAILY PRN
Qty: 30 CAPSULE | Refills: 2 | Status: SHIPPED | OUTPATIENT
Start: 2018-11-01 | End: 2019-03-21 | Stop reason: SDUPTHER

## 2018-11-01 RX ORDER — ALBUTEROL SULFATE 90 UG/1
AEROSOL, METERED RESPIRATORY (INHALATION)
Qty: 18 G | Refills: 0 | Status: CANCELLED | OUTPATIENT
Start: 2018-11-01

## 2018-11-01 RX ORDER — METOPROLOL SUCCINATE 100 MG/1
TABLET, EXTENDED RELEASE ORAL
Qty: 30 TABLET | Refills: 2 | Status: SHIPPED | OUTPATIENT
Start: 2018-11-01 | End: 2019-03-21 | Stop reason: SDUPTHER

## 2018-11-01 RX ORDER — MIRTAZAPINE 30 MG/1
30 TABLET, FILM COATED ORAL NIGHTLY
Qty: 30 TABLET | Refills: 2 | Status: SHIPPED | OUTPATIENT
Start: 2018-11-01 | End: 2019-07-02

## 2018-11-01 RX ORDER — AZELASTINE 1 MG/ML
2 SPRAY, METERED NASAL 2 TIMES DAILY
Qty: 1 BOTTLE | Refills: 3 | Status: CANCELLED | OUTPATIENT
Start: 2018-11-01

## 2018-11-01 RX ORDER — ALBUTEROL SULFATE 90 UG/1
2 AEROSOL, METERED RESPIRATORY (INHALATION) EVERY 6 HOURS PRN
Qty: 18 G | Refills: 0 | Status: CANCELLED | OUTPATIENT
Start: 2018-11-01

## 2018-11-01 RX ORDER — DIPHENHYDRAMINE HYDROCHLORIDE AND LIDOCAINE HYDROCHLORIDE AND ALUMINUM HYDROXIDE AND MAGNESIUM HYDRO
5 KIT 4 TIMES DAILY PRN
Qty: 1 BOTTLE | Refills: 0 | Status: CANCELLED | OUTPATIENT
Start: 2018-11-01

## 2018-11-01 RX ORDER — LOSARTAN POTASSIUM 50 MG/1
50 TABLET ORAL DAILY
Qty: 30 TABLET | Refills: 2 | Status: SHIPPED | OUTPATIENT
Start: 2018-11-01 | End: 2019-03-21 | Stop reason: SDUPTHER

## 2018-11-01 RX ORDER — LORATADINE 10 MG/1
10 CAPSULE, LIQUID FILLED ORAL DAILY
Qty: 30 CAPSULE | Refills: 2 | Status: CANCELLED | OUTPATIENT
Start: 2018-11-01

## 2018-11-01 RX ORDER — CHLORTHALIDONE 25 MG/1
25 TABLET ORAL DAILY
Qty: 30 TABLET | Refills: 2 | Status: SHIPPED | OUTPATIENT
Start: 2018-11-01 | End: 2019-02-21 | Stop reason: SDUPTHER

## 2018-11-01 RX ORDER — AMLODIPINE BESYLATE 5 MG/1
5 TABLET ORAL DAILY
Qty: 30 TABLET | Refills: 2 | Status: SHIPPED | OUTPATIENT
Start: 2018-11-01 | End: 2019-03-21 | Stop reason: SDUPTHER

## 2018-11-01 ASSESSMENT — PATIENT HEALTH QUESTIONNAIRE - PHQ9: SUM OF ALL RESPONSES TO PHQ QUESTIONS 1-9: 17

## 2018-11-06 DIAGNOSIS — R68.89 FLU-LIKE SYMPTOMS: Primary | ICD-10-CM

## 2018-11-09 ENCOUNTER — TELEPHONE (OUTPATIENT)
Dept: ENT CLINIC | Age: 58
End: 2018-11-09

## 2018-11-14 ENCOUNTER — HOSPITAL ENCOUNTER (OUTPATIENT)
Dept: CARDIAC REHAB | Age: 58
Setting detail: THERAPIES SERIES
Discharge: HOME | End: 2018-11-14
Payer: MEDICARE

## 2018-11-14 PROCEDURE — 93798 PHYS/QHP OP CAR RHAB W/ECG: CPT

## 2018-11-16 DIAGNOSIS — R23.2 VASOMOTOR FLUSHING: ICD-10-CM

## 2018-11-20 ENCOUNTER — OFFICE VISIT (OUTPATIENT)
Dept: ENT CLINIC | Age: 58
End: 2018-11-20
Payer: MEDICARE

## 2018-11-20 VITALS
DIASTOLIC BLOOD PRESSURE: 64 MMHG | WEIGHT: 193 LBS | HEIGHT: 57 IN | HEART RATE: 53 BPM | BODY MASS INDEX: 41.64 KG/M2 | SYSTOLIC BLOOD PRESSURE: 108 MMHG

## 2018-11-20 DIAGNOSIS — E04.1 THYROID NODULE: Primary | ICD-10-CM

## 2018-11-20 PROCEDURE — G8417 CALC BMI ABV UP PARAM F/U: HCPCS | Performed by: OTOLARYNGOLOGY

## 2018-11-20 PROCEDURE — 99213 OFFICE O/P EST LOW 20 MIN: CPT | Performed by: OTOLARYNGOLOGY

## 2018-11-20 PROCEDURE — G8484 FLU IMMUNIZE NO ADMIN: HCPCS | Performed by: OTOLARYNGOLOGY

## 2018-11-20 PROCEDURE — G8598 ASA/ANTIPLAT THER USED: HCPCS | Performed by: OTOLARYNGOLOGY

## 2018-11-20 PROCEDURE — 3017F COLORECTAL CA SCREEN DOC REV: CPT | Performed by: OTOLARYNGOLOGY

## 2018-11-20 PROCEDURE — 1036F TOBACCO NON-USER: CPT | Performed by: OTOLARYNGOLOGY

## 2018-11-20 PROCEDURE — G8427 DOCREV CUR MEDS BY ELIG CLIN: HCPCS | Performed by: OTOLARYNGOLOGY

## 2018-11-20 RX ORDER — HYDROCODONE BITARTRATE AND ACETAMINOPHEN 10; 325 MG/1; MG/1
1 TABLET ORAL 4 TIMES DAILY
COMMUNITY
Start: 2018-11-07 | End: 2021-07-02

## 2018-11-20 RX ORDER — CLONIDINE HYDROCHLORIDE 0.1 MG/1
TABLET ORAL
Qty: 90 TABLET | Refills: 0 | OUTPATIENT
Start: 2018-11-20

## 2018-11-20 RX ORDER — DULOXETIN HYDROCHLORIDE 20 MG/1
20 CAPSULE, DELAYED RELEASE ORAL DAILY
COMMUNITY
End: 2019-04-19

## 2018-11-20 NOTE — PROGRESS NOTES
daily, Disp: 60 tablet, Rfl: 2    HYDROcodone-acetaminophen (NORCO) 7.5-325 MG per tablet, Take 1 tablet by mouth every 8 hours as needed for Pain for up to 30 days THIS PRESCRIPTION IS A 30 DAY SUPPLY. ( ICD-10:  G 89.4). Earliest Fill Date: 1/5/18, Disp: 90 tablet, Rfl: 0  Patient has no known allergies. Social History   Substance Use Topics    Smoking status: Former Smoker     Packs/day: 1.50     Years: 40.00     Types: Cigarettes     Quit date: 9/20/2018    Smokeless tobacco: Never Used      Comment: started at age 21-quit three times previously    Alcohol use No     Family History   Problem Relation Age of Onset    Arthritis Mother     High Blood Pressure Mother     Rheum Arthritis Mother     High Blood Pressure Father     Other Father         emphysema    High Blood Pressure Brother     Depression Daughter     High Blood Pressure Brother        Review of Systems   Constitutional: Negative for chills and fever. HENT: Negative for ear discharge and hearing loss. Respiratory: Negative for cough and shortness of breath. Cardiovascular: Negative for chest pain and palpitations. Gastrointestinal: Negative for vomiting. Skin: Negative for rash. Allergic/Immunologic: Negative for environmental allergies. Neurological: Negative for dizziness and headaches. Hematological: Does not bruise/bleed easily. All other systems reviewed and are negative. /64 (Site: Left Upper Arm, Position: Sitting, Cuff Size: Medium Adult)   Pulse 53   Ht 4' 9\" (1.448 m)   Wt 193 lb (87.5 kg)   BMI 41.76 kg/m²   Physical Exam   Constitutional: She appears well-developed and well-nourished. HENT:   Head: Normocephalic and atraumatic. Right Ear: Tympanic membrane, external ear and ear canal normal.   Left Ear: Tympanic membrane, external ear and ear canal normal.   Nose: No mucosal edema, rhinorrhea, nose lacerations or septal deviation. Mouth/Throat: Uvula is midline.  Mucous membranes are not

## 2018-12-09 ASSESSMENT — ENCOUNTER SYMPTOMS
VOMITING: 0
COUGH: 0
SHORTNESS OF BREATH: 0

## 2019-02-21 DIAGNOSIS — I10 ESSENTIAL HYPERTENSION: ICD-10-CM

## 2019-02-22 RX ORDER — CHLORTHALIDONE 25 MG/1
25 TABLET ORAL DAILY
Qty: 30 TABLET | Refills: 0 | Status: SHIPPED | OUTPATIENT
Start: 2019-02-22 | End: 2019-03-21 | Stop reason: SDUPTHER

## 2019-03-07 RX ORDER — ATORVASTATIN CALCIUM 80 MG/1
TABLET, FILM COATED ORAL
Qty: 90 TABLET | Refills: 0 | Status: SHIPPED | OUTPATIENT
Start: 2019-03-07 | End: 2019-03-21 | Stop reason: SDUPTHER

## 2019-03-07 RX ORDER — ATORVASTATIN CALCIUM 80 MG/1
80 TABLET, FILM COATED ORAL NIGHTLY
Qty: 30 TABLET | Refills: 3 | Status: SHIPPED | OUTPATIENT
Start: 2019-03-07 | End: 2019-03-07 | Stop reason: SDUPTHER

## 2019-03-21 ENCOUNTER — HOSPITAL ENCOUNTER (OUTPATIENT)
Age: 59
Discharge: HOME | End: 2019-03-23
Payer: MEDICARE

## 2019-03-21 ENCOUNTER — OFFICE VISIT (OUTPATIENT)
Dept: FAMILY MEDICINE CLINIC | Age: 59
End: 2019-03-21
Payer: MEDICARE

## 2019-03-21 VITALS
HEART RATE: 64 BPM | OXYGEN SATURATION: 93 % | WEIGHT: 205 LBS | TEMPERATURE: 97.6 F | SYSTOLIC BLOOD PRESSURE: 110 MMHG | DIASTOLIC BLOOD PRESSURE: 74 MMHG | BODY MASS INDEX: 41.33 KG/M2 | HEIGHT: 59 IN

## 2019-03-21 DIAGNOSIS — I10 ESSENTIAL HYPERTENSION: ICD-10-CM

## 2019-03-21 DIAGNOSIS — R63.5 WEIGHT GAIN: ICD-10-CM

## 2019-03-21 DIAGNOSIS — I25.10 CAD IN NATIVE ARTERY: Chronic | ICD-10-CM

## 2019-03-21 DIAGNOSIS — R05.3 CHRONIC COUGH: ICD-10-CM

## 2019-03-21 DIAGNOSIS — R42 VERTIGO: Primary | ICD-10-CM

## 2019-03-21 DIAGNOSIS — R42 VERTIGO: ICD-10-CM

## 2019-03-21 DIAGNOSIS — E66.01 MORBID OBESITY WITH BMI OF 40.0-44.9, ADULT (HCC): ICD-10-CM

## 2019-03-21 LAB
ALBUMIN SERPL-MCNC: 4 G/DL (ref 3.5–5.2)
ALP BLD-CCNC: 103 U/L (ref 35–104)
ALT SERPL-CCNC: 21 U/L (ref 0–32)
ANION GAP SERPL CALCULATED.3IONS-SCNC: 13 MMOL/L (ref 7–16)
ANISOCYTOSIS: ABNORMAL
AST SERPL-CCNC: 21 U/L (ref 0–31)
BASOPHILS ABSOLUTE: 0.11 E9/L (ref 0–0.2)
BASOPHILS RELATIVE PERCENT: 0.8 % (ref 0–2)
BILIRUB SERPL-MCNC: 0.2 MG/DL (ref 0–1.2)
BUN BLDV-MCNC: 13 MG/DL (ref 6–20)
C-REACTIVE PROTEIN: 0.9 MG/DL (ref 0–0.4)
CALCIUM SERPL-MCNC: 9 MG/DL (ref 8.6–10.2)
CHLORIDE BLD-SCNC: 100 MMOL/L (ref 98–107)
CO2: 27 MMOL/L (ref 22–29)
CREAT SERPL-MCNC: 0.9 MG/DL (ref 0.5–1)
EOSINOPHILS ABSOLUTE: 0.24 E9/L (ref 0.05–0.5)
EOSINOPHILS RELATIVE PERCENT: 1.7 % (ref 0–6)
GFR AFRICAN AMERICAN: >60
GFR NON-AFRICAN AMERICAN: >60 ML/MIN/1.73
GLUCOSE BLD-MCNC: 88 MG/DL (ref 74–99)
HCT VFR BLD CALC: 45.8 % (ref 34–48)
HEMOGLOBIN: 14.6 G/DL (ref 11.5–15.5)
IMMATURE GRANULOCYTES #: 0.05 E9/L
IMMATURE GRANULOCYTES %: 0.4 % (ref 0–5)
LYMPHOCYTES ABSOLUTE: 3.25 E9/L (ref 1.5–4)
LYMPHOCYTES RELATIVE PERCENT: 23.5 % (ref 20–42)
MCH RBC QN AUTO: 29.8 PG (ref 26–35)
MCHC RBC AUTO-ENTMCNC: 31.9 % (ref 32–34.5)
MCV RBC AUTO: 93.5 FL (ref 80–99.9)
MONOCYTES ABSOLUTE: 1.51 E9/L (ref 0.1–0.95)
MONOCYTES RELATIVE PERCENT: 10.9 % (ref 2–12)
NEUTROPHILS ABSOLUTE: 8.68 E9/L (ref 1.8–7.3)
NEUTROPHILS RELATIVE PERCENT: 62.7 % (ref 43–80)
PDW BLD-RTO: 14 FL (ref 11.5–15)
PLATELET # BLD: 369 E9/L (ref 130–450)
PMV BLD AUTO: 11.6 FL (ref 7–12)
POLYCHROMASIA: ABNORMAL
POTASSIUM SERPL-SCNC: 4.5 MMOL/L (ref 3.5–5)
RBC # BLD: 4.9 E12/L (ref 3.5–5.5)
SODIUM BLD-SCNC: 140 MMOL/L (ref 132–146)
TOTAL PROTEIN: 7.3 G/DL (ref 6.4–8.3)
TSH SERPL DL<=0.05 MIU/L-ACNC: 3.51 UIU/ML (ref 0.27–4.2)
VITAMIN B-12: 1023 PG/ML (ref 211–946)
WBC # BLD: 13.8 E9/L (ref 4.5–11.5)

## 2019-03-21 PROCEDURE — 36415 COLL VENOUS BLD VENIPUNCTURE: CPT | Performed by: FAMILY MEDICINE

## 2019-03-21 PROCEDURE — 1036F TOBACCO NON-USER: CPT | Performed by: FAMILY MEDICINE

## 2019-03-21 PROCEDURE — G8484 FLU IMMUNIZE NO ADMIN: HCPCS | Performed by: FAMILY MEDICINE

## 2019-03-21 PROCEDURE — G8417 CALC BMI ABV UP PARAM F/U: HCPCS | Performed by: FAMILY MEDICINE

## 2019-03-21 PROCEDURE — 80053 COMPREHEN METABOLIC PANEL: CPT

## 2019-03-21 PROCEDURE — 85025 COMPLETE CBC W/AUTO DIFF WBC: CPT

## 2019-03-21 PROCEDURE — G8427 DOCREV CUR MEDS BY ELIG CLIN: HCPCS | Performed by: FAMILY MEDICINE

## 2019-03-21 PROCEDURE — 82607 VITAMIN B-12: CPT

## 2019-03-21 PROCEDURE — 99214 OFFICE O/P EST MOD 30 MIN: CPT | Performed by: FAMILY MEDICINE

## 2019-03-21 PROCEDURE — 86140 C-REACTIVE PROTEIN: CPT

## 2019-03-21 PROCEDURE — 3017F COLORECTAL CA SCREEN DOC REV: CPT | Performed by: FAMILY MEDICINE

## 2019-03-21 PROCEDURE — 84443 ASSAY THYROID STIM HORMONE: CPT

## 2019-03-21 PROCEDURE — G8598 ASA/ANTIPLAT THER USED: HCPCS | Performed by: FAMILY MEDICINE

## 2019-03-21 RX ORDER — METOPROLOL SUCCINATE 100 MG/1
TABLET, EXTENDED RELEASE ORAL
Qty: 90 TABLET | Refills: 0 | Status: SHIPPED | OUTPATIENT
Start: 2019-03-21 | End: 2019-07-02 | Stop reason: SDUPTHER

## 2019-03-21 RX ORDER — LOSARTAN POTASSIUM 50 MG/1
50 TABLET ORAL DAILY
Qty: 90 TABLET | Refills: 1 | Status: SHIPPED | OUTPATIENT
Start: 2019-03-21 | End: 2019-08-13 | Stop reason: SDUPTHER

## 2019-03-21 RX ORDER — PREGABALIN 100 MG/1
100 CAPSULE ORAL 2 TIMES DAILY
Qty: 60 CAPSULE | Refills: 0 | Status: CANCELLED | OUTPATIENT
Start: 2019-03-21 | End: 2019-04-20

## 2019-03-21 RX ORDER — ALBUTEROL SULFATE 90 UG/1
AEROSOL, METERED RESPIRATORY (INHALATION)
Qty: 18 G | Refills: 0 | Status: SHIPPED | OUTPATIENT
Start: 2019-03-21 | End: 2019-07-02 | Stop reason: SDUPTHER

## 2019-03-21 RX ORDER — MECLIZINE HYDROCHLORIDE 25 MG/1
25 TABLET ORAL 3 TIMES DAILY PRN
Qty: 30 TABLET | Refills: 1 | Status: SHIPPED | OUTPATIENT
Start: 2019-03-21 | End: 2019-03-31

## 2019-03-21 RX ORDER — CHLORTHALIDONE 25 MG/1
25 TABLET ORAL DAILY
Qty: 90 TABLET | Refills: 0 | Status: SHIPPED | OUTPATIENT
Start: 2019-03-21 | End: 2019-07-02 | Stop reason: SDUPTHER

## 2019-03-21 RX ORDER — LOSARTAN POTASSIUM 50 MG/1
50 TABLET ORAL DAILY
Qty: 30 TABLET | Refills: 3 | Status: SHIPPED | OUTPATIENT
Start: 2019-03-21 | End: 2019-03-21 | Stop reason: SDUPTHER

## 2019-03-21 RX ORDER — AMLODIPINE BESYLATE 5 MG/1
5 TABLET ORAL DAILY
Qty: 30 TABLET | Refills: 2 | Status: SHIPPED | OUTPATIENT
Start: 2019-03-21 | End: 2019-03-21 | Stop reason: SDUPTHER

## 2019-03-21 RX ORDER — VARENICLINE TARTRATE 1 MG/1
1 TABLET, FILM COATED ORAL 2 TIMES DAILY
Qty: 60 TABLET | Refills: 2 | Status: CANCELLED | OUTPATIENT
Start: 2019-03-21

## 2019-03-21 RX ORDER — RANOLAZINE 500 MG/1
500 TABLET, EXTENDED RELEASE ORAL 2 TIMES DAILY
Qty: 60 TABLET | Refills: 3 | Status: CANCELLED | OUTPATIENT
Start: 2019-03-21

## 2019-03-21 RX ORDER — ZOLPIDEM TARTRATE 10 MG/1
10 TABLET ORAL NIGHTLY PRN
Qty: 30 TABLET | Refills: 2 | Status: CANCELLED | OUTPATIENT
Start: 2019-03-21

## 2019-03-21 RX ORDER — AMLODIPINE BESYLATE 5 MG/1
5 TABLET ORAL DAILY
Qty: 90 TABLET | Refills: 0 | Status: SHIPPED | OUTPATIENT
Start: 2019-03-21 | End: 2019-07-02 | Stop reason: SDUPTHER

## 2019-03-21 RX ORDER — MIRTAZAPINE 30 MG/1
30 TABLET, FILM COATED ORAL NIGHTLY
Qty: 30 TABLET | Refills: 2 | Status: CANCELLED | OUTPATIENT
Start: 2019-03-21

## 2019-03-21 RX ORDER — ACETAMINOPHEN 160 MG
2000 TABLET,DISINTEGRATING ORAL DAILY
Qty: 30 CAPSULE | Refills: 5 | Status: SHIPPED
Start: 2019-03-21 | End: 2020-11-19 | Stop reason: SDUPTHER

## 2019-03-21 RX ORDER — OMEPRAZOLE 20 MG/1
CAPSULE, DELAYED RELEASE ORAL
Qty: 90 CAPSULE | Refills: 0 | Status: SHIPPED | OUTPATIENT
Start: 2019-03-21 | End: 2019-07-02 | Stop reason: SDUPTHER

## 2019-03-21 RX ORDER — METOPROLOL SUCCINATE 100 MG/1
TABLET, EXTENDED RELEASE ORAL
Qty: 30 TABLET | Refills: 3 | Status: SHIPPED | OUTPATIENT
Start: 2019-03-21 | End: 2019-03-21 | Stop reason: SDUPTHER

## 2019-03-21 RX ORDER — ASPIRIN 81 MG/1
81 TABLET, CHEWABLE ORAL DAILY
Qty: 30 TABLET | Refills: 3 | Status: SHIPPED
Start: 2019-03-21 | End: 2020-05-12 | Stop reason: SDUPTHER

## 2019-03-21 RX ORDER — LORATADINE 10 MG/1
10 CAPSULE, LIQUID FILLED ORAL DAILY
Qty: 30 CAPSULE | Refills: 2 | Status: SHIPPED | OUTPATIENT
Start: 2019-03-21 | End: 2019-07-02 | Stop reason: ALTCHOICE

## 2019-03-21 RX ORDER — OMEPRAZOLE 20 MG/1
20 CAPSULE, DELAYED RELEASE ORAL DAILY PRN
Qty: 30 CAPSULE | Refills: 2 | Status: SHIPPED | OUTPATIENT
Start: 2019-03-21 | End: 2019-03-21 | Stop reason: SDUPTHER

## 2019-03-21 RX ORDER — ATORVASTATIN CALCIUM 80 MG/1
TABLET, FILM COATED ORAL
Qty: 90 TABLET | Refills: 1 | Status: SHIPPED | OUTPATIENT
Start: 2019-03-21 | End: 2019-09-24 | Stop reason: SDUPTHER

## 2019-03-21 ASSESSMENT — PATIENT HEALTH QUESTIONNAIRE - PHQ9
2. FEELING DOWN, DEPRESSED OR HOPELESS: 1
SUM OF ALL RESPONSES TO PHQ QUESTIONS 1-9: 2
SUM OF ALL RESPONSES TO PHQ QUESTIONS 1-9: 2
SUM OF ALL RESPONSES TO PHQ9 QUESTIONS 1 & 2: 2
1. LITTLE INTEREST OR PLEASURE IN DOING THINGS: 1

## 2019-03-21 ASSESSMENT — ENCOUNTER SYMPTOMS
WHEEZING: 0
COUGH: 0

## 2019-04-01 ENCOUNTER — HOSPITAL ENCOUNTER (EMERGENCY)
Age: 59
Discharge: LEFT BEFORE DISCHARGE | End: 2019-04-01
Payer: MEDICARE

## 2019-04-01 ENCOUNTER — APPOINTMENT (OUTPATIENT)
Dept: GENERAL RADIOLOGY | Age: 59
End: 2019-04-01
Payer: MEDICARE

## 2019-04-01 VITALS — WEIGHT: 194 LBS | BODY MASS INDEX: 41.85 KG/M2 | HEIGHT: 57 IN | HEART RATE: 96 BPM | OXYGEN SATURATION: 93 %

## 2019-04-01 LAB
ALBUMIN SERPL-MCNC: 4 G/DL (ref 3.5–5.2)
ALP BLD-CCNC: 108 U/L (ref 35–104)
ALT SERPL-CCNC: 26 U/L (ref 0–32)
ANION GAP SERPL CALCULATED.3IONS-SCNC: 11 MMOL/L (ref 7–16)
AST SERPL-CCNC: 24 U/L (ref 0–31)
BASOPHILS ABSOLUTE: 0.07 E9/L (ref 0–0.2)
BASOPHILS RELATIVE PERCENT: 0.7 % (ref 0–2)
BILIRUB SERPL-MCNC: 0.3 MG/DL (ref 0–1.2)
BUN BLDV-MCNC: 10 MG/DL (ref 6–20)
CALCIUM SERPL-MCNC: 9.1 MG/DL (ref 8.6–10.2)
CHLORIDE BLD-SCNC: 102 MMOL/L (ref 98–107)
CO2: 28 MMOL/L (ref 22–29)
CREAT SERPL-MCNC: 0.8 MG/DL (ref 0.5–1)
EOSINOPHILS ABSOLUTE: 0.24 E9/L (ref 0.05–0.5)
EOSINOPHILS RELATIVE PERCENT: 2.3 % (ref 0–6)
GFR AFRICAN AMERICAN: >60
GFR NON-AFRICAN AMERICAN: >60 ML/MIN/1.73
GLUCOSE BLD-MCNC: 91 MG/DL (ref 74–99)
HCT VFR BLD CALC: 43.5 % (ref 34–48)
HEMOGLOBIN: 14 G/DL (ref 11.5–15.5)
IMMATURE GRANULOCYTES #: 0.04 E9/L
IMMATURE GRANULOCYTES %: 0.4 % (ref 0–5)
LYMPHOCYTES ABSOLUTE: 3.83 E9/L (ref 1.5–4)
LYMPHOCYTES RELATIVE PERCENT: 36 % (ref 20–42)
MCH RBC QN AUTO: 29.8 PG (ref 26–35)
MCHC RBC AUTO-ENTMCNC: 32.2 % (ref 32–34.5)
MCV RBC AUTO: 92.6 FL (ref 80–99.9)
MONOCYTES ABSOLUTE: 0.92 E9/L (ref 0.1–0.95)
MONOCYTES RELATIVE PERCENT: 8.6 % (ref 2–12)
NEUTROPHILS ABSOLUTE: 5.55 E9/L (ref 1.8–7.3)
NEUTROPHILS RELATIVE PERCENT: 52 % (ref 43–80)
PDW BLD-RTO: 13.8 FL (ref 11.5–15)
PLATELET # BLD: 318 E9/L (ref 130–450)
PMV BLD AUTO: 10.9 FL (ref 7–12)
POTASSIUM SERPL-SCNC: 3.9 MMOL/L (ref 3.5–5)
PRO-BNP: 444 PG/ML (ref 0–125)
RBC # BLD: 4.7 E12/L (ref 3.5–5.5)
SODIUM BLD-SCNC: 141 MMOL/L (ref 132–146)
TOTAL PROTEIN: 7.3 G/DL (ref 6.4–8.3)
TROPONIN: <0.01 NG/ML (ref 0–0.03)
WBC # BLD: 10.7 E9/L (ref 4.5–11.5)

## 2019-04-01 PROCEDURE — 80053 COMPREHEN METABOLIC PANEL: CPT

## 2019-04-01 PROCEDURE — 85025 COMPLETE CBC W/AUTO DIFF WBC: CPT

## 2019-04-01 PROCEDURE — 83880 ASSAY OF NATRIURETIC PEPTIDE: CPT

## 2019-04-01 PROCEDURE — 84484 ASSAY OF TROPONIN QUANT: CPT

## 2019-04-01 PROCEDURE — 71046 X-RAY EXAM CHEST 2 VIEWS: CPT

## 2019-04-01 PROCEDURE — 93005 ELECTROCARDIOGRAM TRACING: CPT | Performed by: PHYSICIAN ASSISTANT

## 2019-04-01 PROCEDURE — 99285 EMERGENCY DEPT VISIT HI MDM: CPT

## 2019-04-01 PROCEDURE — 36415 COLL VENOUS BLD VENIPUNCTURE: CPT

## 2019-04-01 ASSESSMENT — PAIN DESCRIPTION - LOCATION: LOCATION: CHEST

## 2019-04-01 ASSESSMENT — PAIN DESCRIPTION - PAIN TYPE: TYPE: ACUTE PAIN

## 2019-04-01 ASSESSMENT — PAIN SCALES - GENERAL: PAINLEVEL_OUTOF10: 8

## 2019-04-02 LAB
EKG ATRIAL RATE: 87 BPM
EKG P AXIS: 67 DEGREES
EKG P-R INTERVAL: 112 MS
EKG Q-T INTERVAL: 364 MS
EKG QRS DURATION: 82 MS
EKG QTC CALCULATION (BAZETT): 438 MS
EKG R AXIS: 60 DEGREES
EKG T AXIS: 66 DEGREES
EKG VENTRICULAR RATE: 87 BPM

## 2019-04-19 ENCOUNTER — OFFICE VISIT (OUTPATIENT)
Dept: BARIATRICS/WEIGHT MGMT | Age: 59
End: 2019-04-19
Payer: MEDICARE

## 2019-04-19 VITALS
HEART RATE: 72 BPM | HEIGHT: 58 IN | WEIGHT: 196.4 LBS | TEMPERATURE: 97.5 F | DIASTOLIC BLOOD PRESSURE: 87 MMHG | SYSTOLIC BLOOD PRESSURE: 123 MMHG | BODY MASS INDEX: 41.23 KG/M2

## 2019-04-19 DIAGNOSIS — E66.01 CLASS 3 SEVERE OBESITY DUE TO EXCESS CALORIES WITH SERIOUS COMORBIDITY AND BODY MASS INDEX (BMI) OF 40.0 TO 44.9 IN ADULT (HCC): ICD-10-CM

## 2019-04-19 DIAGNOSIS — M54.9 CHRONIC BACK PAIN, UNSPECIFIED BACK LOCATION, UNSPECIFIED BACK PAIN LATERALITY: Primary | ICD-10-CM

## 2019-04-19 DIAGNOSIS — G89.29 CHRONIC BACK PAIN, UNSPECIFIED BACK LOCATION, UNSPECIFIED BACK PAIN LATERALITY: Primary | ICD-10-CM

## 2019-04-19 DIAGNOSIS — R53.83 FATIGUE, UNSPECIFIED TYPE: ICD-10-CM

## 2019-04-19 PROCEDURE — G8598 ASA/ANTIPLAT THER USED: HCPCS | Performed by: INTERNAL MEDICINE

## 2019-04-19 PROCEDURE — G8427 DOCREV CUR MEDS BY ELIG CLIN: HCPCS | Performed by: INTERNAL MEDICINE

## 2019-04-19 PROCEDURE — 99204 OFFICE O/P NEW MOD 45 MIN: CPT | Performed by: INTERNAL MEDICINE

## 2019-04-19 PROCEDURE — 3017F COLORECTAL CA SCREEN DOC REV: CPT | Performed by: INTERNAL MEDICINE

## 2019-04-19 PROCEDURE — 99201 HC NEW PT, E/M LEVEL 1: CPT

## 2019-04-19 PROCEDURE — 4004F PT TOBACCO SCREEN RCVD TLK: CPT | Performed by: INTERNAL MEDICINE

## 2019-04-19 PROCEDURE — G8417 CALC BMI ABV UP PARAM F/U: HCPCS | Performed by: INTERNAL MEDICINE

## 2019-04-19 RX ORDER — DULOXETIN HYDROCHLORIDE 30 MG/1
30 CAPSULE, DELAYED RELEASE ORAL NIGHTLY
Refills: 2 | COMMUNITY
Start: 2019-02-14 | End: 2021-07-29 | Stop reason: ALTCHOICE

## 2019-04-19 RX ORDER — DULOXETIN HYDROCHLORIDE 60 MG/1
60 CAPSULE, DELAYED RELEASE ORAL DAILY
Refills: 0 | COMMUNITY
Start: 2019-03-26 | End: 2020-11-19 | Stop reason: SDUPTHER

## 2019-04-19 RX ORDER — NALOXONE HYDROCHLORIDE 4 MG/.1ML
SPRAY NASAL PRN
Refills: 0 | COMMUNITY
Start: 2019-02-22

## 2019-04-19 RX ORDER — FUROSEMIDE 20 MG/1
20 TABLET ORAL DAILY
Refills: 3 | COMMUNITY
Start: 2019-03-27 | End: 2021-03-26

## 2019-04-19 RX ORDER — ETANERCEPT 50 MG/ML
SOLUTION SUBCUTANEOUS WEEKLY
COMMUNITY
Start: 2019-04-18 | End: 2020-11-19

## 2019-04-19 RX ORDER — FLUTICASONE PROPIONATE 50 MCG
SPRAY, SUSPENSION (ML) NASAL
Refills: 3 | COMMUNITY
Start: 2019-03-02 | End: 2019-09-18

## 2019-04-19 NOTE — PROGRESS NOTES
Date of Encounter: 04/19/19    Chief Complaint   Patient presents with    Obesity    Weight Gain       HPI:     Problem -  Chronic back pain   Duration -  Began in 30's   Severity -  high   Context -  Secondary to RA, AS and OA  Takes Embrel with some relief  Activity is severely compromised due to the pain   Aggravating/Alleviating Factors -  Excess wt is worsening her pain     Problem -  Obesity   Duration -  Began 2016   Severity -  high   Context -  BMI 41.8               Wt 196.4 lbs  HS grad wt                    130 lbs  Highest wt                     205 lbs  Lowest wt                     130 lbs  Pattern of wt gain         Rapid over past two years  Wt change past yr       16 lbs  Most wt lost                  20 lbs (Slim fast)  Diets attempted           Slim fast   Aggravating/Alleviating Factors -  Ad dakotah eating is worsening her excess wt      Diet:    Number of meals per day - 2    Number of snacks per day - 3    Meal volume - 12\" plate, occ seconds    Fast food/convenience store - 2-3x/week    Restaurants (not fast food) - 0x/week   Sweets - 2-3d/week   Chips - 4-5d/week   Crackers/pretzels - 1-2d/week   Nuts - 7d/week (2 single serving bags)   Peanut Butter - 1-2d/week   Popcorn - 1-2d/week   Dried fruit - 0d/week   Whole fruit - 4-5d/week (1 piece)   Breakfast cereal - 7d/week (Life, Special with berries)   Granola/Protein/Energy bar - 0d/week   Sugar sweetened beverages - none     Exercise:    Gym membership - none    Walking - none    Running - none    Resistance - none    Aerobic class - none    PMH/PSH/FamHx/SocHx:   Each of these were reviewed in detail with the patient this visit by me. I recorded the obtained information in the chart under the respective heading. Medications: The patient's current medication list was reviewed by me in detail. I entered or updated all medications.      REVIEW OF SYSTEMS (Past 1 month): Negative if  [], Positive if [x]     Lungs    [] Cough  [] Shortness her activity. Weight reduction will improve the biomechanics on the spine   Will proceed with the weight loss plan outlined below    2. Obesity - uncontrolled   Presently eating ad dakotah and including trouble foods and high risk eating behaviors in her weekly routine   Given that her caloric needs are so low, a VLCD will be the best approach to losing weight   Therefore the instructions given to the patient are:    Breakfast -     one high protein shake      Lunch -           one high protein shake                           + one snack item     Dinner -          one frozen meal                           + one snack item    One snack must be a fat snack    Fat Snack - 1 1/2 tablespoon of peanut butter or 22 almonds (11 grams of fat) or 1 tablespoon of a oil-based dressing on a bed of lettuce     Shake options (>24 grams protein, < 200 ayesha):   Premier, Pure Protein, Ensure Max and Boost Max (check out other options in the Protein Powder Report on the [address] [place] [address] web site)     Snack options (<100 ayesha, no sweets): one small (a Gala size not a Honeycrisp size) piece of fruit, one 100 ayesha serving of low fat [place] yogurt, one low fat mozzarella cheese stick, one 100 ayesha pack of almonds, 100 ayesha of chips/crackers/pretzels, 100 ayesha of frozen vegetables     Frozen meal options:  Weight Watchers Smart Ones, Lean Cuisine, Healthy Choice, Steamers, Michelini's (approx 300 ayesha each)    Take a multivitamin daily    Walk 10 min every day     Check TSH to r/o hypothyroidism    3. Follow up:  See me back in four - six weeks  Call me with any problems:  [phone]    I spent 50 minutes in a face to face encounter with Mark Ivan today.    >40 minutes of this was in education and counseling regarding dietary interventions for weight reduction, weight loss plan options, physiology of wt gain, medications that cause weight gain and exercise    Maurice Clemons MD  Endocrinology/Obesity  4/19/19

## 2019-04-19 NOTE — PATIENT INSTRUCTIONS
Breakfast -     one high protein shake      Lunch -           one high protein shake                           + one snack item     Dinner -          one frozen meal                           + one snack item    One snack must be a fat snack    Fat Snack - 1 1/2 tablespoon of peanut butter or 22 almonds (11 grams of fat) or 1 tablespoon of a oil-based dressing on a bed of lettuce     Shake options (>24 grams protein, < 200 ayesha):   Premier, Pure Protein, Ensure Max and Boost Max (check out other options in the Protein Powder Report on the [address] [place] [address] web site)     Snack options (<100 ayesha, no sweets): one small (a Gala size not a Honeycrisp size) piece of fruit, one 100 ayesha serving of low fat [place] yogurt, one low fat mozzarella cheese stick, one 100 ayesha pack of almonds, 100 yaesha of chips/crackers/pretzels, 100 ayesha of frozen vegetables     Frozen meal options:  Weight Watchers Smart Ones, Lean Cuisine, Healthy Choice, Steamers, Michelini's (approx 300 ayesha each)    Take a multivitamin daily    Walk 10 min every day    Have a TSH checked     See me back in four - six weeks    Call me with any problems:  [phone]

## 2019-05-05 ENCOUNTER — HOSPITAL ENCOUNTER (OUTPATIENT)
Dept: MRI IMAGING | Age: 59
Discharge: HOME | End: 2019-05-07
Payer: MEDICARE

## 2019-05-05 DIAGNOSIS — M45.9 ANKYLOSING SPONDYLITIS, UNSPECIFIED SITE OF SPINE (HCC): ICD-10-CM

## 2019-05-05 PROCEDURE — 72141 MRI NECK SPINE W/O DYE: CPT

## 2019-05-09 LAB
BUN BLDV-MCNC: 20 MG/DL
CALCIUM SERPL-MCNC: 9.5 MG/DL
CHLORIDE BLD-SCNC: 98 MMOL/L
CO2: 27 MMOL/L
CREAT SERPL-MCNC: 0.88 MG/DL
GFR CALCULATED: NORMAL
GLUCOSE BLD-MCNC: 99 MG/DL
POTASSIUM SERPL-SCNC: 3.2 MMOL/L
SODIUM BLD-SCNC: 140 MMOL/L

## 2019-05-16 ENCOUNTER — HOSPITAL ENCOUNTER (EMERGENCY)
Age: 59
Discharge: HOME | End: 2019-05-16
Payer: MEDICARE

## 2019-05-16 ENCOUNTER — APPOINTMENT (OUTPATIENT)
Dept: GENERAL RADIOLOGY | Age: 59
End: 2019-05-16
Payer: MEDICARE

## 2019-05-16 VITALS
DIASTOLIC BLOOD PRESSURE: 80 MMHG | HEIGHT: 58 IN | RESPIRATION RATE: 16 BRPM | SYSTOLIC BLOOD PRESSURE: 142 MMHG | HEART RATE: 56 BPM | OXYGEN SATURATION: 93 % | TEMPERATURE: 97.9 F | WEIGHT: 190.13 LBS | BODY MASS INDEX: 39.91 KG/M2

## 2019-05-16 DIAGNOSIS — S46.919A STRAIN OF SHOULDER, UNSPECIFIED LATERALITY, INITIAL ENCOUNTER: Primary | ICD-10-CM

## 2019-05-16 DIAGNOSIS — S20.211A CONTUSION OF RIB ON RIGHT SIDE, INITIAL ENCOUNTER: ICD-10-CM

## 2019-05-16 PROCEDURE — 73030 X-RAY EXAM OF SHOULDER: CPT

## 2019-05-16 PROCEDURE — 71101 X-RAY EXAM UNILAT RIBS/CHEST: CPT

## 2019-05-16 PROCEDURE — 99283 EMERGENCY DEPT VISIT LOW MDM: CPT

## 2019-05-16 RX ORDER — CYCLOBENZAPRINE HCL 10 MG
10 TABLET ORAL 3 TIMES DAILY PRN
Qty: 10 TABLET | Refills: 0 | Status: SHIPPED | OUTPATIENT
Start: 2019-05-16 | End: 2019-05-20 | Stop reason: ALTCHOICE

## 2019-05-16 ASSESSMENT — PAIN DESCRIPTION - ORIENTATION: ORIENTATION: RIGHT;LEFT

## 2019-05-16 ASSESSMENT — PAIN DESCRIPTION - LOCATION: LOCATION: ARM;RIB CAGE

## 2019-05-16 ASSESSMENT — PAIN DESCRIPTION - ONSET: ONSET: ON-GOING

## 2019-05-16 ASSESSMENT — PAIN DESCRIPTION - DESCRIPTORS: DESCRIPTORS: DISCOMFORT

## 2019-05-16 ASSESSMENT — PAIN - FUNCTIONAL ASSESSMENT: PAIN_FUNCTIONAL_ASSESSMENT: PREVENTS OR INTERFERES SOME ACTIVE ACTIVITIES AND ADLS

## 2019-05-16 ASSESSMENT — PAIN DESCRIPTION - FREQUENCY: FREQUENCY: CONTINUOUS

## 2019-05-16 ASSESSMENT — PAIN SCALES - GENERAL: PAINLEVEL_OUTOF10: 10

## 2019-05-16 ASSESSMENT — PAIN DESCRIPTION - PROGRESSION: CLINICAL_PROGRESSION: GRADUALLY WORSENING

## 2019-05-16 NOTE — ED PROVIDER NOTES
[facility]     Department of Emergency Medicine   ED  Provider Note  Admit Date/RoomTime: 5/16/2019 12:37 PM  ED Room: [place] [address]  Chief Complaint   Fall (fell this am tripped at [employer] c/o pain rt ribs and rt shoulder )    History of Present Illness   Source of history provided by:  patient. History/Exam Limitations: none. Mariah Mckee is a 66 y.o. old female who has a past medical history of:   Past Medical History:   Diagnosis Date    Anxiety and depression 9/11/2013    Baker's cyst, ruptured     CAD (coronary artery disease)     Chronic back pain     ankylosing spondylitis , RA    Chronic myocarditis ([address] 12/1/2016    Diverticulosis of sigmoid colon     Duodenal ulcer     Facet arthropathy     Fibrocystic breast changes     Fracture of metatarsal bone     RIGHT FOOT    Genital warts     GERD (gastroesophageal reflux disease)     Heart attack ([address] 09/15/2018    History of blood transfusion     loss of blood during pregnancy    History of cervical dysplasia 12/16/2013    Hyperlipidemia     Hypertension     IGT (impaired glucose tolerance) 11/11/2015    Myocarditis ([address]     Dr. Kathie Gimenez    Obesity     OLIVIA (obstructive sleep apnea)     CPAP    Osteoarthritis of multiple joints 8/13/2013    Osteomyelitis ([address]     Primary insomnia 11/11/2015    Protruded cervical disc/DDD with neural foraminal stenosis 6/10/2015    Pulmonary nodule 9/27/2016    Sacroiliitis ([address] 6/2/2014    Shingles     Tobacco abuse     Valvular heart disease 11/30/2016    Per cardiac MRI 11/8/16-Mild TR/Mild AI Follows with [facility] Dr Kathie Gimenez    presents to the emergency department by private vehicle, for a fall which occured at 9 AM this morning. Patient states that she tripped over the curb at the [employer] and fell, striking her right chest on the ground. She denies hitting her head, no loss consciousness. She is on Plavix but no other blood thinners. She denies any neck or back pain. years old    Dr. Firman Krabbe  8/20/12   Social History:  reports that she has been smoking cigarettes. She has a 20.00 pack-year smoking history. She has never used smokeless tobacco. She reports that she does not drink alcohol or use drugs. Family History: family history includes Arthritis in her mother; Depression in her daughter; High Blood Pressure in her brother, brother, father, and mother; No Known Problems in her son; Osteoarthritis in her father; Other in her father; Rheum Arthritis in her mother. Allergies: Patient has no known allergies. Physical Exam   Oxygen Saturation Interpretation: Normal.  ED Triage Vitals [05/16/19 1234]   BP Temp Temp Source Pulse Resp SpO2 Height Weight   (!) 142/82 97.9 °F (36.6 °C) Oral 64 16 97 % 4' 10\" (1.473 m) 190 lb 2 oz (86.2 kg)       Physical Exam  · Constitutional:  Alert, development consistent with age. · HEENT:  NC/NT. Airway patent. · Neck:  No midline or paravertebral tenderness. Normal ROM. Supple. · Chest:  Symmetrical without visible rash. Moderate tenderness over the right anterior and lateral chest wall and rib cage. No step-offs are appreciated. There is small bruise to the right anterior chest wall. No breast tenderness to palpation. · Respiratory:  Lungs Clear to auscultation and breath sounds equal.  · CV:  Regular rate and rhythm, normal heart sounds, without pathological murmurs, ectopy, gallops, or rubs. · GI:  Abdomen Soft, nontender, good bowel sounds. No firm or pulsatile mass. Patient denies any right upper quadrant abdominal pain to palpation. · Pelvis:  Stable, nontender to palpation. · Back:  No midline or paravertebral tenderness. No costovertebral tenderness. · Extremities: 2+ radial pulses bilaterally. Bilateral shoulder tenderness to palpation diffusely. No swelling or erythema. Full active range of motion at the right shoulders.  No tenderness into the upper arms or elbows bilaterally. · Integument:  Normal turgor. Warm, dry, without visible rash, unless noted elsewhere. · Lymphatic: no lymphadenopathy noted  · Neurological:  Oriented x3, GCS 15. Motor functions intact. Lab / Imaging Results   (All laboratory and radiology results have been personally reviewed by myself)  Labs:  No results found for this visit on 05/16/19. Imaging: All Radiology results interpreted by Radiologist unless otherwise noted. XR SHOULDER RIGHT (MIN 2 VIEWS)   Final Result   No acute osseous abnormality. XR RIBS RIGHT INCLUDE CHEST (MIN 3 VIEWS)   Final Result   1. No acute displaced rib fracture is identified. 2. No acute cardiopulmonary disease. XR SHOULDER LEFT (MIN 2 VIEWS)   Final Result   No acute osseous abnormality. ED Course / Medical Decision Making   Medications - No data to display     Re-examination:  5/16/19     Time: 1430  Patients symptoms show no change. Updated on results. Consult(s):   None    Procedure(s):   none    MDM:   Patient has no acute fx on x-ray today. Patient denies head injury. Not hypoxic. Appears well otherwise. Will d/c home at this time. Advised to return to the ER if worse in any way. Otherwise to f/u w/PCP. Counseling: The emergency provider has spoken with the patient and discussed todays results, in addition to providing specific details for the plan of care and counseling regarding the diagnosis and prognosis. Questions are answered at this time and they are agreeable with the plan. Assessment      1. Strain of shoulder, unspecified laterality, initial encounter    2.  Contusion of rib on right side, initial encounter      Plan   Discharge to home  Patient condition is good    New Medications     New Prescriptions    CYCLOBENZAPRINE (FLEXERIL) 10 MG TABLET    Take 1 tablet by mouth 3 times daily as needed for Muscle spasms     Electronically signed by JAGJIT Govea   DD: 5/16/19  **This report was transcribed using voice recognition software. Every effort was made to ensure accuracy; however, inadvertent computerized transcription errors may be present.   END OF ED PROVIDER NOTE       Jasiel Howard  05/16/19 2317

## 2019-05-20 ENCOUNTER — OFFICE VISIT (OUTPATIENT)
Dept: FAMILY MEDICINE CLINIC | Age: 59
End: 2019-05-20
Payer: MEDICARE

## 2019-05-20 VITALS
HEART RATE: 66 BPM | OXYGEN SATURATION: 97 % | RESPIRATION RATE: 16 BRPM | WEIGHT: 193 LBS | BODY MASS INDEX: 40.51 KG/M2 | HEIGHT: 58 IN | SYSTOLIC BLOOD PRESSURE: 125 MMHG | DIASTOLIC BLOOD PRESSURE: 81 MMHG

## 2019-05-20 DIAGNOSIS — R07.81 RIB PAIN ON RIGHT SIDE: Primary | ICD-10-CM

## 2019-05-20 PROCEDURE — G8427 DOCREV CUR MEDS BY ELIG CLIN: HCPCS | Performed by: FAMILY MEDICINE

## 2019-05-20 PROCEDURE — 3017F COLORECTAL CA SCREEN DOC REV: CPT | Performed by: FAMILY MEDICINE

## 2019-05-20 PROCEDURE — 99213 OFFICE O/P EST LOW 20 MIN: CPT | Performed by: FAMILY MEDICINE

## 2019-05-20 PROCEDURE — 4004F PT TOBACCO SCREEN RCVD TLK: CPT | Performed by: FAMILY MEDICINE

## 2019-05-20 PROCEDURE — G8598 ASA/ANTIPLAT THER USED: HCPCS | Performed by: FAMILY MEDICINE

## 2019-05-20 PROCEDURE — G8417 CALC BMI ABV UP PARAM F/U: HCPCS | Performed by: FAMILY MEDICINE

## 2019-05-20 RX ORDER — POTASSIUM CHLORIDE 1500 MG/1
1 TABLET, FILM COATED, EXTENDED RELEASE ORAL EVERY MORNING
Refills: 5 | COMMUNITY
Start: 2019-05-10 | End: 2019-09-27 | Stop reason: SDUPTHER

## 2019-05-20 RX ORDER — METHOCARBAMOL 750 MG/1
750 TABLET, FILM COATED ORAL 4 TIMES DAILY PRN
Qty: 28 TABLET | Refills: 0 | Status: SHIPPED | OUTPATIENT
Start: 2019-05-20 | End: 2019-05-27

## 2019-05-20 RX ORDER — IBUPROFEN 800 MG/1
800 TABLET ORAL 2 TIMES DAILY PRN
Qty: 60 TABLET | Refills: 0 | Status: SHIPPED | OUTPATIENT
Start: 2019-05-20 | End: 2019-05-30 | Stop reason: ALTCHOICE

## 2019-05-20 RX ORDER — ARIPIPRAZOLE 5 MG/1
1 TABLET ORAL EVERY MORNING
Refills: 2 | COMMUNITY
Start: 2019-04-17 | End: 2020-05-12 | Stop reason: ALTCHOICE

## 2019-05-20 NOTE — PROGRESS NOTES
Subjective:      Patient ID: Arian Rajput is a 66 y.o. female. HPI: Pt is here f/u from ER visit for a fall. Had a mechanical fall, fell forward on concrete floor. Was helped up and she went to ER for eval for shoulder and R sided chest wall pain. Reports bruising on R upper breast.  Pt reports pain has been worsening since the ER visit. She is currently taking norco and flexeril with minimal pain relief. Pt takes norco chronically for back and joint pain. Report she has pain with deep breathing. Reports chronic cough and some sob. Needs help with ambulation still. Review of Systems  Neg except as noted above  Objective:   Physical Exam   Constitutional: She is oriented to person, place, and time. She appears well-developed. No distress. Obese   Eyes: Pupils are equal, round, and reactive to light. EOM are normal. No scleral icterus. Neck: Neck supple. No JVD present. Cardiovascular: Normal rate and regular rhythm. Pulmonary/Chest: Effort normal and breath sounds normal.   Musculoskeletal: Normal range of motion. She exhibits tenderness. She exhibits no edema or deformity. R chest wall moderately ttp, minimal bruising noted on R upper breast. Shoulder exam benign. Neurological: She is alert and oriented to person, place, and time. /81   Pulse 66   Resp 16   Ht 4' 10\" (1.473 m)   Wt 193 lb (87.5 kg)   SpO2 97%   BMI 40.34 kg/m²     Assessment:      Tiffany was seen today for ed follow-up. Diagnoses and all orders for this visit:    Rib pain on right side- switch pain control to NSAID, since pt uses opiate chronically. Counseled extensively on NSAID use with daily aspirin has higher risk of bleeding and monitor symptoms at home. Use NSAID prn only and if pain not better in 1 week, RTC for follow up. Also changed flexeril to robaxin today. -     ibuprofen (ADVIL;MOTRIN) 800 MG tablet;  Take 1 tablet by mouth 2 times daily as needed for Pain  -     methocarbamol
findings with the resident. I agree with the assessment, plan and orders as documented by the resident. Please refer to the resident note for additional information.       Electronically signed by Bethanie Angel DO on 5/22/2019 at 11:13 AM

## 2019-05-22 ENCOUNTER — OFFICE VISIT (OUTPATIENT)
Dept: BARIATRICS/WEIGHT MGMT | Age: 59
End: 2019-05-22
Payer: MEDICARE

## 2019-05-22 VITALS
SYSTOLIC BLOOD PRESSURE: 135 MMHG | HEART RATE: 66 BPM | WEIGHT: 190 LBS | BODY MASS INDEX: 39.88 KG/M2 | TEMPERATURE: 97.7 F | HEIGHT: 58 IN | DIASTOLIC BLOOD PRESSURE: 82 MMHG

## 2019-05-22 DIAGNOSIS — M54.9 CHRONIC BACK PAIN, UNSPECIFIED BACK LOCATION, UNSPECIFIED BACK PAIN LATERALITY: Primary | ICD-10-CM

## 2019-05-22 DIAGNOSIS — E66.01 CLASS 3 SEVERE OBESITY DUE TO EXCESS CALORIES WITH SERIOUS COMORBIDITY AND BODY MASS INDEX (BMI) OF 40.0 TO 44.9 IN ADULT (HCC): ICD-10-CM

## 2019-05-22 DIAGNOSIS — G89.29 CHRONIC BACK PAIN, UNSPECIFIED BACK LOCATION, UNSPECIFIED BACK PAIN LATERALITY: Primary | ICD-10-CM

## 2019-05-22 PROCEDURE — 3017F COLORECTAL CA SCREEN DOC REV: CPT | Performed by: INTERNAL MEDICINE

## 2019-05-22 PROCEDURE — 4004F PT TOBACCO SCREEN RCVD TLK: CPT | Performed by: INTERNAL MEDICINE

## 2019-05-22 PROCEDURE — 99213 OFFICE O/P EST LOW 20 MIN: CPT | Performed by: INTERNAL MEDICINE

## 2019-05-22 PROCEDURE — G8427 DOCREV CUR MEDS BY ELIG CLIN: HCPCS | Performed by: INTERNAL MEDICINE

## 2019-05-22 PROCEDURE — G8417 CALC BMI ABV UP PARAM F/U: HCPCS | Performed by: INTERNAL MEDICINE

## 2019-05-22 PROCEDURE — G8598 ASA/ANTIPLAT THER USED: HCPCS | Performed by: INTERNAL MEDICINE

## 2019-05-22 PROCEDURE — 99211 OFF/OP EST MAY X REQ PHY/QHP: CPT

## 2019-05-22 NOTE — PROGRESS NOTES
chosen is [address])  Did not follow it 4 days of the 31 days since last visit  Walking some  Taking a MV    Back pain was exacerbated by recent fall    TSH was 3.51 on 3/21/19 (I missed last visit that it had already been done.)      Meds -  Current Outpatient Medications on File Prior to Visit   Medication Sig Dispense Refill    ARIPiprazole (ABILIFY) 5 MG tablet Take 1 tablet by mouth every morning  2    potassium chloride (KLOR-CON M) 20 MEQ TBCR extended release tablet Take 1 tablet by mouth every morning  5    ibuprofen (ADVIL;MOTRIN) 800 MG tablet Take 1 tablet by mouth 2 times daily as needed for Pain 60 tablet 0    methocarbamol (ROBAXIN) 750 MG tablet Take 1 tablet by mouth 4 times daily as needed (pain) 28 tablet 0    DULoxetine (CYMBALTA) 60 MG extended release capsule Take 60 mg by mouth daily  0    furosemide (LASIX) 20 MG tablet Take 20 mg by mouth daily  3    DULoxetine (CYMBALTA) 30 MG extended release capsule Take 30 mg by mouth nightly  2    LYRICA 75 MG capsule Take 75 mg by mouth 2 times daily.   0    diclofenac sodium 1 % GEL as needed  0    ENBREL MINI 50 MG/ML SOCT once a week      fluticasone (FLONASE) 50 MCG/ACT nasal spray SHAKE LQ AND U 2 SPRAYS IEN D  3    NARCAN 4 MG/0.1ML LIQD nasal spray as needed  0    albuterol sulfate HFA (VENTOLIN HFA) 108 (90 Base) MCG/ACT inhaler INHALE 2 PUFFS INTO THE LUNGS EVERY 6 HOURS AS NEEDED FOR WHEEZING OR SHORTNESS OF BREATH 18 g 0    loratadine (CLARITIN) 10 MG capsule Take 1 capsule by mouth daily allergies 30 capsule 2    Cholecalciferol (VITAMIN D3) 2000 units CAPS Take 1 capsule by mouth daily 30 capsule 5    chlorthalidone (HYGROTON) 25 MG tablet Take 1 tablet by mouth daily 90 tablet 0    atorvastatin (LIPITOR) 80 MG tablet TAKE 1 TABLET BY MOUTH EVERY NIGHT 90 tablet 1    aspirin 81 MG chewable tablet Take 1 tablet by mouth daily 30 tablet 3    metoprolol succinate (TOPROL XL) 100 MG extended release tablet TAKE 1 TABLET BY MOUTH DAILY 90 tablet 0    omeprazole (PRILOSEC) 20 MG delayed release capsule TAKE 1 CAPSULE BY MOUTH DAILY AS NEEDED FOR HEARTBURN 90 capsule 0    amLODIPine (NORVASC) 5 MG tablet TAKE 1 TABLET BY MOUTH DAILY 90 tablet 0    losartan (COZAAR) 50 MG tablet TAKE 1 TABLET BY MOUTH DAILY 90 tablet 1    HYDROcodone-acetaminophen (NORCO)  MG per tablet       mirtazapine (REMERON) 30 MG tablet Take 1 tablet by mouth nightly 30 tablet 2    nitroGLYCERIN (NITROSTAT) 0.4 MG SL tablet up to max of 3 total doses. If no relief after 1 dose, call 911. 25 tablet 3    ranolazine (RANEXA) 500 MG extended release tablet Take 1 tablet by mouth 2 times daily 60 tablet 3    clopidogrel (PLAVIX) 75 MG tablet Take 1 tablet by mouth daily 30 tablet 3    azelastine (ASTELIN) 0.1 % nasal spray 2 sprays by Nasal route 2 times daily Use in each nostril as directed 1 Bottle 3    Multiple Vitamins-Minerals (THERAPEUTIC MULTIVITAMIN-MINERALS) tablet Take 1 tablet by mouth daily      buPROPion (WELLBUTRIN SR) 150 MG extended release tablet Take 150 mg by mouth 2 times daily      zolpidem (AMBIEN) 10 MG tablet Take 1 tablet by mouth nightly as needed for Sleep 30 tablet 2    varenicline (CHANTIX CONTINUING MONTH MARICEL) 1 MG tablet Take 1 tablet by mouth 2 times daily 60 tablet 2     No current facility-administered medications on file prior to visit. ROS - GI: no N/V/D    PE -  Gen : /82 (Site: Left Lower Arm, Position: Sitting, Cuff Size: Medium Adult)   Pulse 66   Temp 97.7 °F (36.5 °C)   Ht 4' 9.5\" (1.461 m)   Wt 190 lb (86.2 kg)   BMI 40.40 kg/m²    WN, WD, NAD  Lung: Nml resp effort  Psych: Normal mood   Full affect  Neur: Moves all extremities well    Test Results -  No new labs or imaging since last encounter    A/P -   1. Chronic back pain - uncontrolled   Pain continues. Worsened by recent fall.    Weight reduction will improve the biomechanics on the spine   Will proceed with the weight loss plan outlined below    2. Obesity - uncontrolled but improving   Given that her caloric needs are so low, a VLCD is the best approach to losing weight   Doing well. Tolerating the caloric deficit   Therefore the instructions given to the patient are:    Breakfast -     one high protein shake      Lunch -           one high protein shake                           + one snack item     Dinner -          one frozen meal                           + one snack item    One snack must be a fat snack    Fat Snack - 1 1/2 tablespoon of peanut butter or 22 almonds (11 grams of fat) or 1 tablespoon of a oil-based dressing on a bed of lettuce     Shake options (>24 grams protein, < 200 ayesha):   Premier, Pure Protein, Ensure Max and Boost Max (check out other options in the Protein Powder Report on the [address] [place] [address] web site)     Snack options (<100 ayesha, no sweets): one small (a Gala size not a Honeycrisp size) piece of fruit, one 100 ayesha serving of low fat [place] yogurt, one low fat mozzarella cheese stick, one 100 ayesha pack of almonds, 100 ayesha of chips/crackers/pretzels, 100 ayesha of frozen vegetables     Frozen meal options:  Weight Watchers Smart Ones, Lean Cuisine, Healthy Choice, Steamers, Michelini's (approx 300 ayesha each)    Take a multivitamin daily    Walk 10 min every day    3.   Follow up:  See me back in four - six weeks  Call me with any problems:  [phone]    Monica Mcneil MD  Endocrinology/Obesity  5/22/19

## 2019-05-22 NOTE — PATIENT INSTRUCTIONS
Breakfast -     one high protein shake      Lunch -           one high protein shake                           + one snack item     Dinner -          one frozen meal                           + one snack item    One snack must be a fat snack    Fat Snack - 1 1/2 tablespoon of peanut butter or 22 almonds (11 grams of fat) or 1 tablespoon of a oil-based dressing on a bed of lettuce     Shake options (>24 grams protein, < 200 ayesha):   Premier, Pure Protein, Ensure Max and Boost Max (check out other options in the Protein Powder Report on the [address] [place] [address] web site)     Snack options (<100 ayesha, no sweets): one small (a Gala size not a Honeycrisp size) piece of fruit, one 100 ayesha serving of low fat [place] yogurt, one low fat mozzarella cheese stick, one 100 ayesha pack of almonds, 100 ayesha of chips/crackers/pretzels, 100 ayesha of frozen vegetables     Frozen meal options:  Weight Watchers Smart Ones, Lean Cuisine, Healthy Choice, Steamers, Michelini's (approx 300 ayesha each)    Take a multivitamin daily    Walk 10 min every day     See me back in four - six weeks    Call me with any problems:  [phone]

## 2019-05-30 ENCOUNTER — OFFICE VISIT (OUTPATIENT)
Dept: FAMILY MEDICINE CLINIC | Age: 59
End: 2019-05-30
Payer: MEDICARE

## 2019-05-30 VITALS
SYSTOLIC BLOOD PRESSURE: 135 MMHG | RESPIRATION RATE: 18 BRPM | BODY MASS INDEX: 39.47 KG/M2 | HEIGHT: 58 IN | DIASTOLIC BLOOD PRESSURE: 91 MMHG | HEART RATE: 68 BPM | WEIGHT: 188 LBS

## 2019-05-30 DIAGNOSIS — Z72.0 TOBACCO ABUSE: ICD-10-CM

## 2019-05-30 DIAGNOSIS — S20.211S CHEST WALL CONTUSION, RIGHT, SEQUELA: Primary | ICD-10-CM

## 2019-05-30 PROCEDURE — 3017F COLORECTAL CA SCREEN DOC REV: CPT | Performed by: FAMILY MEDICINE

## 2019-05-30 PROCEDURE — G8427 DOCREV CUR MEDS BY ELIG CLIN: HCPCS | Performed by: FAMILY MEDICINE

## 2019-05-30 PROCEDURE — G8598 ASA/ANTIPLAT THER USED: HCPCS | Performed by: FAMILY MEDICINE

## 2019-05-30 PROCEDURE — 4004F PT TOBACCO SCREEN RCVD TLK: CPT | Performed by: FAMILY MEDICINE

## 2019-05-30 PROCEDURE — G8417 CALC BMI ABV UP PARAM F/U: HCPCS | Performed by: FAMILY MEDICINE

## 2019-05-30 PROCEDURE — 99213 OFFICE O/P EST LOW 20 MIN: CPT | Performed by: FAMILY MEDICINE

## 2019-05-30 RX ORDER — VARENICLINE TARTRATE 1 MG/1
1 TABLET, FILM COATED ORAL 2 TIMES DAILY
Qty: 60 TABLET | Refills: 2 | Status: SHIPPED | OUTPATIENT
Start: 2019-05-30 | End: 2019-09-27 | Stop reason: SDUPTHER

## 2019-05-30 RX ORDER — METHOCARBAMOL 750 MG/1
750 TABLET, FILM COATED ORAL 4 TIMES DAILY
Qty: 40 TABLET | Refills: 0 | Status: SHIPPED | OUTPATIENT
Start: 2019-05-30 | End: 2019-06-09

## 2019-05-30 NOTE — PROGRESS NOTES
Subjective:      Patient ID: Dulce Irvin is a 66 y.o. female. HPI: Pt is here for follow up on R sided chest wall pain. Using robaxin as needed. Notices pain with coughing mostly? Holds on to her breast when coughing. Denies excessive coughing. Denies sob, palpitations. Also used some ibuprofen for breakthrough pain after taking her norco. Does not take NSAID every day. Reports significant pain relief with NSAID use. No new complaints today. Review of Systems  Neg except as noted above  Objective:   Physical Exam   Constitutional: She appears well-developed. No distress. Eyes: No scleral icterus. Neck: Normal range of motion. Cardiovascular: Normal rate and regular rhythm. Pulmonary/Chest: Effort normal and breath sounds normal.   Musculoskeletal: She exhibits tenderness (right sided chest wall pain, bruising over right breast, improved). She exhibits no edema. Skin: Skin is warm. BP (!) 135/91   Pulse 68   Resp 18   Ht 4' 9.5\" (1.461 m)   Wt 188 lb (85.3 kg)   BMI 39.98 kg/m²     Assessment:      Crystal was seen today for rib injury and shoulder pain. Diagnoses and all orders for this visit:    Chest wall contusion, right, sequela  -     methocarbamol (ROBAXIN-750) 750 MG tablet; Take 1 tablet by mouth 4 times daily for 10 days    Tobacco abuse  -     varenicline (CHANTIX CONTINUING MONTH MARICEL) 1 MG tablet; Take 1 tablet by mouth 2 times daily          Plan:      As above. Call or go to ED immediately if symptoms worsen or persist.  Return in about 3 weeks (around 6/20/2019) for rib pain, tobacco abuse., or sooner if necessary. Counseled regarding above diagnosis, including possible risks and complications,  especially if left uncontrolled.   Counseled regarding the possible side effects, risks, benefits and alternatives to treatment; patient and/or guardian verbalizes understanding, agrees, feels comfortable with and wishes to proceed with above treatment plan.    Advised patient to call with any new medication issues, and read all Rx info from pharmacy to assure aware of all possible risks and side effects of medication before taking. Patient and/or guardian verbalizes understanding and agrees with above counseling, assessment and plan. All questions answered.   Gabo Noriega MD  PGY-3  5/30/2019

## 2019-05-30 NOTE — PROGRESS NOTES
Subjective:    Tiffany comes in today for a follow up for severe right chest wall pain following a fall in a parking lot. She is better today than at the last visit and she is on the mend. Robaxin helped a lot. ROS: Otherwise negative    Patient Active Problem List   Diagnosis    HTN (hypertension), benign    Osteoarthritis of multiple joints    Anxiety and depression    IGT (impaired glucose tolerance)    Essential hypertension    Diverticulosis of sigmoid colon    GERD (gastroesophageal reflux disease)    Osteoarthritis of bilateral hip    Facet syndrome, lumbar    Pulmonary nodule    Valvular heart disease    Deformity of right foot    Chronic myocarditis (HCC)    Chronic pain syndrome    HLA B27 (HLA B27 positive)    OLIVIA (obstructive sleep apnea)    Mixed hyperlipidemia    CAD in native artery    Unstable angina (HCC)    Chest pain    Status post non-ST elevation myocardial infarction (NSTEMI)    Vertigo    Morbid obesity with BMI of 40.0-44.9, adult (HCC)    Obesity    Chronic back pain       Past medical, surgical, family and social history were reviewed, non-contributory, and unchanged unless otherwise stated. Objective:    BP (!) 135/91   Pulse 68   Resp 18   Ht 4' 9.5\" (1.461 m)   Wt 188 lb (85.3 kg)   BMI 39.98 kg/m²     Exam is as noted by resident with the following changes, additions or corrections:      Assessment/Plan:        Tiffany was seen today for rib injury and shoulder pain. Diagnoses and all orders for this visit:    Chest wall contusion, right, sequela  -     methocarbamol (ROBAXIN-750) 750 MG tablet; Take 1 tablet by mouth 4 times daily for 10 days    Tobacco abuse  -     varenicline (CHANTIX CONTINUING MONTH MARICEL) 1 MG tablet; Take 1 tablet by mouth 2 times daily           Attending Physician Statement    I have reviewed the chart, including any radiology or labs.  I have discussed the case, including pertinent history and exam findings with the resident. I agree with the assessment, plan and orders as documented by the resident. Please refer to the resident note for additional information.       Electronically signed by Maribel Doherty DO on 5/31/2019 at 10:33 AM

## 2019-07-02 ENCOUNTER — OFFICE VISIT (OUTPATIENT)
Dept: FAMILY MEDICINE CLINIC | Age: 59
End: 2019-07-02
Payer: MEDICARE

## 2019-07-02 VITALS
HEART RATE: 72 BPM | WEIGHT: 181 LBS | BODY MASS INDEX: 37.99 KG/M2 | HEIGHT: 58 IN | RESPIRATION RATE: 16 BRPM | OXYGEN SATURATION: 98 % | SYSTOLIC BLOOD PRESSURE: 118 MMHG | DIASTOLIC BLOOD PRESSURE: 84 MMHG

## 2019-07-02 DIAGNOSIS — F32.4 MAJOR DEPRESSIVE DISORDER, SINGLE EPISODE, IN PARTIAL REMISSION (HCC): ICD-10-CM

## 2019-07-02 DIAGNOSIS — Z23 NEED FOR PNEUMOCOCCAL VACCINATION: ICD-10-CM

## 2019-07-02 DIAGNOSIS — Z87.891 PERSONAL HISTORY OF TOBACCO USE, PRESENTING HAZARDS TO HEALTH: ICD-10-CM

## 2019-07-02 DIAGNOSIS — I10 ESSENTIAL HYPERTENSION: Primary | ICD-10-CM

## 2019-07-02 DIAGNOSIS — R05.3 CHRONIC COUGH: ICD-10-CM

## 2019-07-02 DIAGNOSIS — Z12.31 ENCOUNTER FOR SCREENING MAMMOGRAM FOR BREAST CANCER: ICD-10-CM

## 2019-07-02 DIAGNOSIS — I25.10 CAD IN NATIVE ARTERY: Chronic | ICD-10-CM

## 2019-07-02 PROBLEM — I25.2 STATUS POST NON-ST ELEVATION MYOCARDIAL INFARCTION (NSTEMI): Chronic | Status: ACTIVE | Noted: 2018-10-14

## 2019-07-02 PROCEDURE — G8427 DOCREV CUR MEDS BY ELIG CLIN: HCPCS | Performed by: FAMILY MEDICINE

## 2019-07-02 PROCEDURE — 3017F COLORECTAL CA SCREEN DOC REV: CPT | Performed by: FAMILY MEDICINE

## 2019-07-02 PROCEDURE — 4004F PT TOBACCO SCREEN RCVD TLK: CPT | Performed by: FAMILY MEDICINE

## 2019-07-02 PROCEDURE — 99214 OFFICE O/P EST MOD 30 MIN: CPT | Performed by: FAMILY MEDICINE

## 2019-07-02 PROCEDURE — 99406 BEHAV CHNG SMOKING 3-10 MIN: CPT | Performed by: FAMILY MEDICINE

## 2019-07-02 PROCEDURE — G8417 CALC BMI ABV UP PARAM F/U: HCPCS | Performed by: FAMILY MEDICINE

## 2019-07-02 PROCEDURE — G0009 ADMIN PNEUMOCOCCAL VACCINE: HCPCS | Performed by: FAMILY MEDICINE

## 2019-07-02 PROCEDURE — G8598 ASA/ANTIPLAT THER USED: HCPCS | Performed by: FAMILY MEDICINE

## 2019-07-02 PROCEDURE — 90732 PPSV23 VACC 2 YRS+ SUBQ/IM: CPT | Performed by: FAMILY MEDICINE

## 2019-07-02 RX ORDER — ALBUTEROL SULFATE 90 UG/1
AEROSOL, METERED RESPIRATORY (INHALATION)
Qty: 18 G | Refills: 0 | Status: SHIPPED
Start: 2019-07-02 | End: 2020-11-19 | Stop reason: SDUPTHER

## 2019-07-02 RX ORDER — AZELASTINE 1 MG/ML
2 SPRAY, METERED NASAL 2 TIMES DAILY
Qty: 1 BOTTLE | Refills: 3 | Status: SHIPPED
Start: 2019-07-02 | End: 2020-05-12 | Stop reason: SDUPTHER

## 2019-07-02 RX ORDER — LORATADINE 10 MG/1
10 CAPSULE, LIQUID FILLED ORAL DAILY
Qty: 30 CAPSULE | Refills: 2 | Status: CANCELLED | OUTPATIENT
Start: 2019-07-02

## 2019-07-02 RX ORDER — CHLORTHALIDONE 25 MG/1
25 TABLET ORAL DAILY
Qty: 90 TABLET | Refills: 0 | Status: SHIPPED | OUTPATIENT
Start: 2019-07-02 | End: 2019-09-27 | Stop reason: SDUPTHER

## 2019-07-02 RX ORDER — METOPROLOL SUCCINATE 100 MG/1
100 TABLET, EXTENDED RELEASE ORAL DAILY
Qty: 90 TABLET | Refills: 0 | Status: SHIPPED | OUTPATIENT
Start: 2019-07-02 | End: 2019-09-27 | Stop reason: SDUPTHER

## 2019-07-02 RX ORDER — MIRTAZAPINE 30 MG/1
30 TABLET, FILM COATED ORAL NIGHTLY PRN
COMMUNITY
Start: 2019-07-02

## 2019-07-02 RX ORDER — OMEPRAZOLE 20 MG/1
20 CAPSULE, DELAYED RELEASE ORAL DAILY
Qty: 90 CAPSULE | Refills: 0 | Status: SHIPPED | OUTPATIENT
Start: 2019-07-02 | End: 2019-09-27 | Stop reason: SDUPTHER

## 2019-07-02 RX ORDER — AMLODIPINE BESYLATE 5 MG/1
5 TABLET ORAL DAILY
Qty: 90 TABLET | Refills: 0 | Status: SHIPPED | OUTPATIENT
Start: 2019-07-02 | End: 2019-09-27 | Stop reason: SDUPTHER

## 2019-07-02 NOTE — PROGRESS NOTES
this visit:    Essential hypertension  bp at goal/ low today  She has been losing weight. If continues to lose weight and bp is below 120 at next visit, will consider reducing or eliminating amlodipine  -     amLODIPine (NORVASC) 5 MG tablet; Take 1 tablet by mouth daily  -     chlorthalidone (HYGROTON) 25 MG tablet; Take 1 tablet by mouth daily  -     metoprolol succinate (TOPROL XL) 100 MG extended release tablet; Take 1 tablet by mouth daily    CAD in native artery  Compliant with meds - BB, ARB, statin, aspirin and plavix  No symptoms of angina  -     metoprolol succinate (TOPROL XL) 100 MG extended release tablet; Take 1 tablet by mouth daily    Chronic cough   Improving    -     albuterol sulfate HFA (VENTOLIN HFA) 108 (90 Base) MCG/ACT inhaler; INHALE 2 PUFFS INTO THE LUNGS EVERY 6 HOURS AS NEEDED FOR WHEEZING OR SHORTNESS OF BREATH  -     azelastine (ASTELIN) 0.1 % nasal spray; 2 sprays by Nasal route 2 times daily Use in each nostril as directed  -     omeprazole (PRILOSEC) 20 MG delayed release capsule; Take 1 capsule by mouth Daily    Need for pneumococcal vaccination  -     Pneumococcal polysaccharide vaccine 23-valent greater than or equal to 1yo subcutaneous/IM    Encounter for screening mammogram for breast cancer  -     ALFONSO DIGITAL SCREEN W CAD BILATERAL; Future    Tobacco use   Discussed at length, pros/cons, strategies for quitting and goal setting  Plans to cut down to no more than 1/2 pack by the beginning of august, half of that by September and hopefuly quitting by october    Return in about 2 months (around 9/2/2019) for pap. Advised patient to call with any new medication issues. Allquestions answered.   Call or go to ED immediately if symptoms worsen or persist.    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 Continue current treatment plan and follow

## 2019-07-03 PROBLEM — F32.4 MAJOR DEPRESSIVE DISORDER, SINGLE EPISODE, IN PARTIAL REMISSION (HCC): Status: ACTIVE | Noted: 2019-07-03

## 2019-07-03 PROBLEM — Z87.891 PERSONAL HISTORY OF TOBACCO USE, PRESENTING HAZARDS TO HEALTH: Status: ACTIVE | Noted: 2019-07-03

## 2019-07-03 ASSESSMENT — ENCOUNTER SYMPTOMS
WHEEZING: 0
DIARRHEA: 0
ABDOMINAL PAIN: 0
APNEA: 0
CHEST TIGHTNESS: 0
CONSTIPATION: 0
COUGH: 1
SHORTNESS OF BREATH: 0

## 2019-08-11 DIAGNOSIS — I25.10 CAD IN NATIVE ARTERY: Chronic | ICD-10-CM

## 2019-08-11 DIAGNOSIS — I10 ESSENTIAL HYPERTENSION: ICD-10-CM

## 2019-08-12 RX ORDER — LOSARTAN POTASSIUM 50 MG/1
50 TABLET ORAL DAILY
Qty: 30 TABLET | Refills: 0 | OUTPATIENT
Start: 2019-08-12

## 2019-08-13 RX ORDER — LOSARTAN POTASSIUM 50 MG/1
50 TABLET ORAL DAILY
Qty: 90 TABLET | Refills: 0 | Status: SHIPPED | OUTPATIENT
Start: 2019-08-13 | End: 2019-12-17 | Stop reason: SDUPTHER

## 2019-08-23 ENCOUNTER — APPOINTMENT (OUTPATIENT)
Dept: GENERAL RADIOLOGY | Age: 59
End: 2019-08-23
Payer: MEDICARE

## 2019-08-23 ENCOUNTER — HOSPITAL ENCOUNTER (EMERGENCY)
Age: 59
Discharge: HOME | End: 2019-08-23
Attending: EMERGENCY MEDICINE
Payer: MEDICARE

## 2019-08-23 ENCOUNTER — APPOINTMENT (OUTPATIENT)
Dept: CT IMAGING | Age: 59
End: 2019-08-23
Payer: MEDICARE

## 2019-08-23 VITALS
RESPIRATION RATE: 18 BRPM | OXYGEN SATURATION: 99 % | HEART RATE: 71 BPM | TEMPERATURE: 98.2 F | DIASTOLIC BLOOD PRESSURE: 88 MMHG | SYSTOLIC BLOOD PRESSURE: 143 MMHG | WEIGHT: 180 LBS | BODY MASS INDEX: 38.28 KG/M2

## 2019-08-23 DIAGNOSIS — M54.50 ACUTE RIGHT-SIDED LOW BACK PAIN WITHOUT SCIATICA: ICD-10-CM

## 2019-08-23 DIAGNOSIS — R10.9 RIGHT FLANK PAIN: Primary | ICD-10-CM

## 2019-08-23 DIAGNOSIS — K59.00 CONSTIPATION, UNSPECIFIED CONSTIPATION TYPE: ICD-10-CM

## 2019-08-23 LAB
ALBUMIN SERPL-MCNC: 4.1 G/DL (ref 3.5–5.2)
ALP BLD-CCNC: 99 U/L (ref 35–104)
ALT SERPL-CCNC: 17 U/L (ref 0–32)
ANION GAP SERPL CALCULATED.3IONS-SCNC: 8 MMOL/L (ref 7–16)
AST SERPL-CCNC: 17 U/L (ref 0–31)
BACTERIA: ABNORMAL /HPF
BASOPHILS ABSOLUTE: 0.09 E9/L (ref 0–0.2)
BASOPHILS RELATIVE PERCENT: 0.8 % (ref 0–2)
BILIRUB SERPL-MCNC: 0.4 MG/DL (ref 0–1.2)
BILIRUBIN URINE: NEGATIVE
BLOOD, URINE: NORMAL
BUN BLDV-MCNC: 10 MG/DL (ref 6–20)
CALCIUM SERPL-MCNC: 10 MG/DL (ref 8.6–10.2)
CHLORIDE BLD-SCNC: 101 MMOL/L (ref 98–107)
CLARITY: CLEAR
CO2: 30 MMOL/L (ref 22–29)
COLOR: YELLOW
CREAT SERPL-MCNC: 0.7 MG/DL (ref 0.5–1)
EOSINOPHILS ABSOLUTE: 0.24 E9/L (ref 0.05–0.5)
EOSINOPHILS RELATIVE PERCENT: 2.2 % (ref 0–6)
EPITHELIAL CELLS, UA: ABNORMAL /HPF
GFR AFRICAN AMERICAN: >60
GFR NON-AFRICAN AMERICAN: >60 ML/MIN/1.73
GLUCOSE BLD-MCNC: 120 MG/DL (ref 74–99)
GLUCOSE URINE: NEGATIVE MG/DL
HCT VFR BLD CALC: 48.4 % (ref 34–48)
HEMOGLOBIN: 15.6 G/DL (ref 11.5–15.5)
IMMATURE GRANULOCYTES #: 0.04 E9/L
IMMATURE GRANULOCYTES %: 0.4 % (ref 0–5)
KETONES, URINE: NEGATIVE MG/DL
LACTIC ACID, SEPSIS: 1.4 MMOL/L (ref 0.5–1.9)
LEUKOCYTE ESTERASE, URINE: NEGATIVE
LYMPHOCYTES ABSOLUTE: 3.34 E9/L (ref 1.5–4)
LYMPHOCYTES RELATIVE PERCENT: 29.9 % (ref 20–42)
MCH RBC QN AUTO: 29.2 PG (ref 26–35)
MCHC RBC AUTO-ENTMCNC: 32.2 % (ref 32–34.5)
MCV RBC AUTO: 90.5 FL (ref 80–99.9)
MONOCYTES ABSOLUTE: 0.91 E9/L (ref 0.1–0.95)
MONOCYTES RELATIVE PERCENT: 8.2 % (ref 2–12)
NEUTROPHILS ABSOLUTE: 6.54 E9/L (ref 1.8–7.3)
NEUTROPHILS RELATIVE PERCENT: 58.5 % (ref 43–80)
NITRITE, URINE: NEGATIVE
PDW BLD-RTO: 14.9 FL (ref 11.5–15)
PH UA: 6 (ref 5–9)
PLATELET # BLD: 346 E9/L (ref 130–450)
PMV BLD AUTO: 11.1 FL (ref 7–12)
POTASSIUM SERPL-SCNC: 4 MMOL/L (ref 3.5–5)
PROTEIN UA: NEGATIVE MG/DL
RBC # BLD: 5.35 E12/L (ref 3.5–5.5)
RBC UA: ABNORMAL /HPF (ref 0–2)
SODIUM BLD-SCNC: 139 MMOL/L (ref 132–146)
SPECIFIC GRAVITY UA: 1.02 (ref 1–1.03)
TOTAL PROTEIN: 7.6 G/DL (ref 6.4–8.3)
TROPONIN: <0.01 NG/ML (ref 0–0.03)
UROBILINOGEN, URINE: 1 E.U./DL
WBC # BLD: 11.2 E9/L (ref 4.5–11.5)
WBC UA: ABNORMAL /HPF (ref 0–5)

## 2019-08-23 PROCEDURE — 99285 EMERGENCY DEPT VISIT HI MDM: CPT

## 2019-08-23 PROCEDURE — 83605 ASSAY OF LACTIC ACID: CPT

## 2019-08-23 PROCEDURE — 93005 ELECTROCARDIOGRAM TRACING: CPT | Performed by: PHYSICIAN ASSISTANT

## 2019-08-23 PROCEDURE — 74176 CT ABD & PELVIS W/O CONTRAST: CPT

## 2019-08-23 PROCEDURE — 71046 X-RAY EXAM CHEST 2 VIEWS: CPT

## 2019-08-23 PROCEDURE — 2580000003 HC RX 258: Performed by: NURSE PRACTITIONER

## 2019-08-23 PROCEDURE — 96374 THER/PROPH/DIAG INJ IV PUSH: CPT

## 2019-08-23 PROCEDURE — 85025 COMPLETE CBC W/AUTO DIFF WBC: CPT

## 2019-08-23 PROCEDURE — 84484 ASSAY OF TROPONIN QUANT: CPT

## 2019-08-23 PROCEDURE — 81001 URINALYSIS AUTO W/SCOPE: CPT

## 2019-08-23 PROCEDURE — 80053 COMPREHEN METABOLIC PANEL: CPT

## 2019-08-23 PROCEDURE — 6360000002 HC RX W HCPCS: Performed by: NURSE PRACTITIONER

## 2019-08-23 PROCEDURE — 96375 TX/PRO/DX INJ NEW DRUG ADDON: CPT

## 2019-08-23 PROCEDURE — 36415 COLL VENOUS BLD VENIPUNCTURE: CPT

## 2019-08-23 RX ORDER — KETOROLAC TROMETHAMINE 30 MG/ML
15 INJECTION, SOLUTION INTRAMUSCULAR; INTRAVENOUS ONCE
Status: COMPLETED | OUTPATIENT
Start: 2019-08-23 | End: 2019-08-23

## 2019-08-23 RX ORDER — POLYETHYLENE GLYCOL 3350 17 G/17G
17 POWDER, FOR SOLUTION ORAL DAILY
Qty: 510 G | Refills: 0 | Status: SHIPPED | OUTPATIENT
Start: 2019-08-23 | End: 2019-09-22

## 2019-08-23 RX ORDER — DOCUSATE SODIUM 100 MG/1
100 CAPSULE, LIQUID FILLED ORAL DAILY
Qty: 30 CAPSULE | Refills: 0 | Status: SHIPPED | OUTPATIENT
Start: 2019-08-23 | End: 2019-09-22

## 2019-08-23 RX ORDER — MORPHINE SULFATE 4 MG/ML
4 INJECTION, SOLUTION INTRAMUSCULAR; INTRAVENOUS ONCE
Status: COMPLETED | OUTPATIENT
Start: 2019-08-23 | End: 2019-08-23

## 2019-08-23 RX ORDER — 0.9 % SODIUM CHLORIDE 0.9 %
1000 INTRAVENOUS SOLUTION INTRAVENOUS ONCE
Status: COMPLETED | OUTPATIENT
Start: 2019-08-23 | End: 2019-08-23

## 2019-08-23 RX ORDER — ONDANSETRON 2 MG/ML
4 INJECTION INTRAMUSCULAR; INTRAVENOUS ONCE
Status: COMPLETED | OUTPATIENT
Start: 2019-08-23 | End: 2019-08-23

## 2019-08-23 RX ADMIN — MORPHINE SULFATE 4 MG: 4 INJECTION, SOLUTION INTRAMUSCULAR; INTRAVENOUS at 12:58

## 2019-08-23 RX ADMIN — ONDANSETRON 4 MG: 2 INJECTION INTRAMUSCULAR; INTRAVENOUS at 11:51

## 2019-08-23 RX ADMIN — SODIUM CHLORIDE 1000 ML: 9 INJECTION, SOLUTION INTRAVENOUS at 11:51

## 2019-08-23 RX ADMIN — KETOROLAC TROMETHAMINE 15 MG: 30 INJECTION, SOLUTION INTRAMUSCULAR; INTRAVENOUS at 11:51

## 2019-08-23 ASSESSMENT — PAIN SCALES - GENERAL
PAINLEVEL_OUTOF10: 10
PAINLEVEL_OUTOF10: 9

## 2019-08-24 LAB
EKG ATRIAL RATE: 59 BPM
EKG P AXIS: 61 DEGREES
EKG P-R INTERVAL: 108 MS
EKG Q-T INTERVAL: 426 MS
EKG QRS DURATION: 90 MS
EKG QTC CALCULATION (BAZETT): 421 MS
EKG R AXIS: 73 DEGREES
EKG T AXIS: 33 DEGREES
EKG VENTRICULAR RATE: 59 BPM

## 2019-08-24 PROCEDURE — 93010 ELECTROCARDIOGRAM REPORT: CPT | Performed by: INTERNAL MEDICINE

## 2019-08-26 NOTE — ED PROVIDER NOTES
injection #1    OTHER SURGICAL HISTORY Left 12/19/2016    genicular radiofrequency    ROTATOR CUFF REPAIR  1999 at 42 years    Dr Lali Cox Left 7/22/14 at 48years old    Dr. Joaquin Valentin  8/20/12   Social History:  reports that she has been smoking cigarettes. She has a 40.00 pack-year smoking history. She has never used smokeless tobacco. She reports that she does not drink alcohol or use drugs. Family History: family history includes Arthritis in her mother; Depression in her daughter; High Blood Pressure in her brother, brother, father, and mother; No Known Problems in her son; Osteoarthritis in her father; Other in her father; Rheum Arthritis in her mother. Allergies: Patient has no known allergies. Physical Exam           ED Triage Vitals   BP Temp Temp src Pulse Resp SpO2 Height Weight   08/23/19 1043 08/23/19 1043 -- 08/23/19 1023 08/23/19 1043 08/23/19 1023 -- 08/23/19 1043   (!) 157/99 98 °F (36.7 °C)  84 18 97 %  180 lb (81.6 kg)      Oxygen Saturation Interpretation: Normal.    · General Appearance/Constitutional:  Alert, development consistent with age. · HEENT:  NC/NT. PERRLA. Airway patent. · Neck:  Supple. No lymphadenopathy. · Respiratory:  No retractions. Lungs Clear to auscultation and breath sounds equal.  · CV:  Regular rate and rhythm. · GI:  General Appearance: normal.         Bowel sounds: normal bowel sounds. Distension:  None. Tenderness: mild tenderness is present in the right flank. Liver: non-tender. Spleen:  non-tender. Pulsatile Mass: absent. Hernia:  no inguinal or femoral hernias noted. · Back: CVA Tenderness: No.  ·        ·  Integument:  Normal turgor. Warm, dry, without visible rash, unless noted elsewhere.   · Lymphatics: No edema, cap.refill Clear    Glucose, Ur Negative Negative mg/dL    Bilirubin Urine Negative Negative    Ketones, Urine Negative Negative mg/dL    Specific Gravity, UA 1.020 1.005 - 1.030    Blood, Urine TRACE-INTACT Negative    pH, UA 6.0 5.0 - 9.0    Protein, UA Negative Negative mg/dL    Urobilinogen, Urine 1.0 <2.0 E.U./dL    Nitrite, Urine Negative Negative    Leukocyte Esterase, Urine Negative Negative   Microscopic Urinalysis   Result Value Ref Range    WBC, UA NONE 0 - 5 /HPF    RBC, UA 1-3 0 - 2 /HPF    Epi Cells RARE /HPF    Bacteria, UA RARE (A) /HPF   EKG 12 Lead   Result Value Ref Range    Ventricular Rate 59 BPM    Atrial Rate 59 BPM    P-R Interval 108 ms    QRS Duration 90 ms    Q-T Interval 426 ms    QTc Calculation (Bazett) 421 ms    P Axis 61 degrees    R Axis 73 degrees    T Axis 33 degrees     Imaging: All Radiology results interpreted by Radiologist unless otherwise noted. CT ABDOMEN PELVIS WO CONTRAST Additional Contrast? None   Final Result   No obstructive uropathy or hydronephrosis. Diverticulosis of colon with constipation and mild thickening of   sigmoid colon may be due to spasm or mild uncomplicated   diverticulitis. Unchanged 2 to 3 mm nodules in the left base. XR CHEST STANDARD (2 VW)   Final Result   No acute cardiopulmonary pathology. ED Course / Medical Decision Making     Medications   0.9 % sodium chloride bolus (0 mLs Intravenous Stopped 8/23/19 1411)   ondansetron (ZOFRAN) injection 4 mg (4 mg Intravenous Given 8/23/19 1151)   ketorolac (TORADOL) injection 15 mg (15 mg Intravenous Given 8/23/19 1151)   morphine sulfate (PF) injection 4 mg (4 mg Intravenous Given 8/23/19 1258)        Re-examination:  8/26/19       Time:     Patients symptoms are improving. Consults:   None    Procedures:   none    MDM:   Patient is a 64year old female who came to the ED with complaints of right back and flank pain. Blood work was obtained and was unremarkable at this time.  Her urine was negative at this time as well. She was given one liter of normal saline, nausea medications and pain medications which she did get relief with. Her CT of her abdomen showed constipation and thickened sigmoid colon. She will be discharged at this time and was given a prescription for colace and Miramax to go home on with. She has remained hemodynamically stable at this time, and is to follow up with the general surgeon I referred her to. Counseling: The emergency provider has spoken with the patient and discussed todays results, in addition to providing specific details for the plan of care and counseling regarding the diagnosis and prognosis. Questions are answered at this time and they are agreeable with the plan to be discharged. Assessment      1. Right flank pain    2. Acute right-sided low back pain without sciatica    3. Constipation, unspecified constipation type      Plan   Discharge to home  Patient condition is good    New Medications     Discharge Medication List as of 8/23/2019  2:00 PM      START taking these medications    Details   docusate sodium (COLACE) 100 MG capsule Take 1 capsule by mouth daily, Disp-30 capsule, R-0Print      polyethylene glycol (MIRALAX) powder Take 17 g by mouth daily, Disp-510 g, R-0Print           Electronically signed by MIKALA Edwards NP   DD: 8/26/19  **This report was transcribed using voice recognition software. Every effort was made to ensure accuracy; however, inadvertent computerized transcription errors may be present.   END OF ED PROVIDER NOTE      MIKALA Oswald NP  08/27/19 [id number]

## 2019-09-18 RX ORDER — FLUTICASONE PROPIONATE 50 MCG
SPRAY, SUSPENSION (ML) NASAL
Qty: 1 BOTTLE | Refills: 3 | Status: SHIPPED | OUTPATIENT
Start: 2019-09-18 | End: 2019-09-18 | Stop reason: SDUPTHER

## 2019-09-20 RX ORDER — FLUTICASONE PROPIONATE 50 MCG
SPRAY, SUSPENSION (ML) NASAL
Qty: 1 BOTTLE | Refills: 3 | Status: SHIPPED | OUTPATIENT
Start: 2019-09-20 | End: 2019-12-17 | Stop reason: SDUPTHER

## 2019-09-25 RX ORDER — ATORVASTATIN CALCIUM 80 MG/1
TABLET, FILM COATED ORAL
Qty: 90 TABLET | Refills: 0 | Status: SHIPPED | OUTPATIENT
Start: 2019-09-25 | End: 2019-12-17 | Stop reason: SDUPTHER

## 2019-09-27 ENCOUNTER — OFFICE VISIT (OUTPATIENT)
Dept: FAMILY MEDICINE CLINIC | Age: 59
End: 2019-09-27
Payer: MEDICARE

## 2019-09-27 VITALS
DIASTOLIC BLOOD PRESSURE: 87 MMHG | RESPIRATION RATE: 16 BRPM | WEIGHT: 176 LBS | SYSTOLIC BLOOD PRESSURE: 120 MMHG | BODY MASS INDEX: 37.97 KG/M2 | HEIGHT: 57 IN | HEART RATE: 73 BPM

## 2019-09-27 DIAGNOSIS — I10 ESSENTIAL HYPERTENSION: ICD-10-CM

## 2019-09-27 DIAGNOSIS — I25.10 CAD IN NATIVE ARTERY: Chronic | ICD-10-CM

## 2019-09-27 DIAGNOSIS — Z72.0 TOBACCO ABUSE: ICD-10-CM

## 2019-09-27 DIAGNOSIS — R05.3 CHRONIC COUGH: ICD-10-CM

## 2019-09-27 PROCEDURE — 3017F COLORECTAL CA SCREEN DOC REV: CPT | Performed by: STUDENT IN AN ORGANIZED HEALTH CARE EDUCATION/TRAINING PROGRAM

## 2019-09-27 PROCEDURE — 99213 OFFICE O/P EST LOW 20 MIN: CPT | Performed by: STUDENT IN AN ORGANIZED HEALTH CARE EDUCATION/TRAINING PROGRAM

## 2019-09-27 PROCEDURE — 4004F PT TOBACCO SCREEN RCVD TLK: CPT | Performed by: STUDENT IN AN ORGANIZED HEALTH CARE EDUCATION/TRAINING PROGRAM

## 2019-09-27 PROCEDURE — G8427 DOCREV CUR MEDS BY ELIG CLIN: HCPCS | Performed by: STUDENT IN AN ORGANIZED HEALTH CARE EDUCATION/TRAINING PROGRAM

## 2019-09-27 PROCEDURE — G8598 ASA/ANTIPLAT THER USED: HCPCS | Performed by: STUDENT IN AN ORGANIZED HEALTH CARE EDUCATION/TRAINING PROGRAM

## 2019-09-27 PROCEDURE — G8417 CALC BMI ABV UP PARAM F/U: HCPCS | Performed by: STUDENT IN AN ORGANIZED HEALTH CARE EDUCATION/TRAINING PROGRAM

## 2019-09-27 RX ORDER — METOPROLOL SUCCINATE 100 MG/1
100 TABLET, EXTENDED RELEASE ORAL DAILY
Qty: 90 TABLET | Refills: 0 | Status: SHIPPED | OUTPATIENT
Start: 2019-09-27 | End: 2019-12-17 | Stop reason: SDUPTHER

## 2019-09-27 RX ORDER — POTASSIUM CHLORIDE 1500 MG/1
20 TABLET, FILM COATED, EXTENDED RELEASE ORAL EVERY MORNING
Qty: 60 TABLET | Refills: 5 | Status: SHIPPED | OUTPATIENT
Start: 2019-09-27 | End: 2020-03-13 | Stop reason: SDUPTHER

## 2019-09-27 RX ORDER — MORPHINE SULFATE 15 MG/1
15 TABLET, FILM COATED, EXTENDED RELEASE ORAL 2 TIMES DAILY
COMMUNITY
Start: 2019-09-26 | End: 2020-05-12 | Stop reason: ALTCHOICE

## 2019-09-27 RX ORDER — VARENICLINE TARTRATE 1 MG/1
1 TABLET, FILM COATED ORAL 2 TIMES DAILY
Qty: 60 TABLET | Refills: 2 | Status: SHIPPED | OUTPATIENT
Start: 2019-09-27 | End: 2019-09-27

## 2019-09-27 RX ORDER — AMLODIPINE BESYLATE 5 MG/1
5 TABLET ORAL DAILY
Qty: 90 TABLET | Refills: 0 | Status: SHIPPED | OUTPATIENT
Start: 2019-09-27 | End: 2019-12-17 | Stop reason: SDUPTHER

## 2019-09-27 RX ORDER — OMEPRAZOLE 20 MG/1
20 CAPSULE, DELAYED RELEASE ORAL DAILY
Qty: 90 CAPSULE | Refills: 0 | Status: SHIPPED | OUTPATIENT
Start: 2019-09-27 | End: 2019-12-17 | Stop reason: SDUPTHER

## 2019-09-27 RX ORDER — VARENICLINE TARTRATE 25 MG
KIT ORAL
Qty: 1 BOX | Refills: 0 | Status: SHIPPED
Start: 2019-09-27 | End: 2020-05-12 | Stop reason: ALTCHOICE

## 2019-09-27 RX ORDER — CHLORTHALIDONE 25 MG/1
25 TABLET ORAL DAILY
Qty: 90 TABLET | Refills: 0 | Status: SHIPPED | OUTPATIENT
Start: 2019-09-27 | End: 2019-12-17 | Stop reason: SDUPTHER

## 2019-09-27 ASSESSMENT — ENCOUNTER SYMPTOMS
COLOR CHANGE: 0
DIARRHEA: 0
PHOTOPHOBIA: 0
SINUS PAIN: 0
WHEEZING: 0
CHEST TIGHTNESS: 0
BLOOD IN STOOL: 0
COUGH: 1
ABDOMINAL PAIN: 0
CONSTIPATION: 0
SHORTNESS OF BREATH: 0
BACK PAIN: 0

## 2019-09-27 NOTE — PROGRESS NOTES
[facility]  Department of Family Medicine  Family Medicine Residency Program      Patient:  Mani Yoon 64 y.o. female     Date of Service: 9/27/19      Chief complaint:   Chief Complaint   Patient presents with    Hypertension       History ofPresent Illness   The patient is a 64 y.o. female  here to follow up of their hypertension and foreign body on the tongue.  She is also interested in smoking cessation       Tongue lesion  -has been present for about a week  -uncertain if she bites her tongue or not  -takes albuterol inhaler prn, no steroids  -no bleeding  -not painful, just irritating  -felt like she had a sore throat 2 weeks ago    Smoking cessation  -has tried chantix in the past but did not follow through  -did not have any adverse side effects including mood changes or nightmares  -no seizure hx  -would like to try again  -her cough persists and is unchanged    Hypertension  -stable, at goal    She needs multiple medications refilled       Past Medical History:      Diagnosis Date    Anxiety and depression 9/11/2013    Baker's cyst, ruptured     CAD (coronary artery disease)     Chronic back pain     ankylosing spondylitis , RA    Chronic myocarditis ([address] 12/1/2016    Diverticulosis of sigmoid colon     Duodenal ulcer     Facet arthropathy     Fibrocystic breast changes     Fracture of metatarsal bone     RIGHT FOOT    Genital warts     GERD (gastroesophageal reflux disease)     Heart attack ([address] 09/15/2018    History of blood transfusion     loss of blood during pregnancy    History of cervical dysplasia 12/16/2013    Hyperlipidemia     Hypertension     IGT (impaired glucose tolerance) 11/11/2015    Myocarditis ([address]     Dr. Prater Stage    Obesity     OLIVIA (obstructive sleep apnea)     CPAP    Osteoarthritis of multiple joints 8/13/2013    Osteomyelitis ([address]     Primary insomnia 11/11/2015    Protruded cervical disc/DDD with neural foraminal stenosis appears to be at baseline and she continues to smoke. Appears to be an order for screening low dose CT and mammogram in the chart. I advised her to speak to her PCP about the screening and encouraged to get the mammogram.    1. Essential hypertension  - amLODIPine (NORVASC) 5 MG tablet; Take 1 tablet by mouth daily  Dispense: 90 tablet; Refill: 0  - chlorthalidone (HYGROTON) 25 MG tablet; Take 1 tablet by mouth daily  Dispense: 90 tablet; Refill: 0  - metoprolol succinate (TOPROL XL) 100 MG extended release tablet; Take 1 tablet by mouth daily  Dispense: 90 tablet; Refill: 0  - potassium chloride (KLOR-CON M) 20 MEQ TBCR extended release tablet; Take 1 tablet by mouth every morning  Dispense: 60 tablet; Refill: 5    2. CAD in native artery  - metoprolol succinate (TOPROL XL) 100 MG extended release tablet; Take 1 tablet by mouth daily  Dispense: 90 tablet; Refill: 0    3. Tobacco abuse  - varenicline (CHANTIX STARTING MONTH PAK) 0.5 MG X 11 & 1 MG X 42 tablet; Take by mouth. Dispense: 1 box; Refill: 0      Counseled regarding above diagnosis, including possible risks and complications,  especially if left uncontrolled. Counseled regarding the possible side effects, risks, benefits and alternatives to treatment;patient and/or guardian verbalizes understanding, agrees, feels comfortable with and wishes to proceed with above treatment plan. Call or go to ED immediately if symptoms worsen or persist. Advised patient to call with any new medication issues, and, as applicable, read all Rx info from pharmacy to assure aware of all possible risks and side effects of medicationbefore taking. Patient and/or guardian given opportunity to ask questions/raise concerns. The patient verbalized comfort and understanding ofinstructions. I encourage further reading and education about your health conditions.   Information on many health conditions is provided by [facility] [employer]:

## 2019-10-20 ENCOUNTER — HOSPITAL ENCOUNTER (EMERGENCY)
Age: 59
Discharge: HOME | End: 2019-10-20
Payer: MEDICARE

## 2019-10-20 VITALS
SYSTOLIC BLOOD PRESSURE: 106 MMHG | RESPIRATION RATE: 20 BRPM | HEART RATE: 64 BPM | TEMPERATURE: 98.4 F | HEIGHT: 57 IN | WEIGHT: 156 LBS | BODY MASS INDEX: 33.66 KG/M2 | DIASTOLIC BLOOD PRESSURE: 56 MMHG | OXYGEN SATURATION: 96 %

## 2019-10-20 DIAGNOSIS — K08.89 PAIN, DENTAL: Primary | ICD-10-CM

## 2019-10-20 PROCEDURE — 6370000000 HC RX 637 (ALT 250 FOR IP): Performed by: NURSE PRACTITIONER

## 2019-10-20 PROCEDURE — 99282 EMERGENCY DEPT VISIT SF MDM: CPT

## 2019-10-20 RX ORDER — AMOXICILLIN 250 MG/1
500 CAPSULE ORAL ONCE
Status: COMPLETED | OUTPATIENT
Start: 2019-10-20 | End: 2019-10-20

## 2019-10-20 RX ORDER — AMOXICILLIN 500 MG/1
500 CAPSULE ORAL 3 TIMES DAILY
Qty: 21 CAPSULE | Refills: 0 | Status: SHIPPED | OUTPATIENT
Start: 2019-10-20 | End: 2019-10-27

## 2019-10-20 RX ORDER — IBUPROFEN 400 MG/1
400 TABLET ORAL ONCE
Status: COMPLETED | OUTPATIENT
Start: 2019-10-20 | End: 2019-10-20

## 2019-10-20 RX ADMIN — IBUPROFEN 400 MG: 400 TABLET, FILM COATED ORAL at 18:55

## 2019-10-20 RX ADMIN — AMOXICILLIN 500 MG: 250 CAPSULE ORAL at 18:39

## 2019-10-20 ASSESSMENT — PAIN DESCRIPTION - LOCATION: LOCATION: EAR

## 2019-10-20 ASSESSMENT — PAIN DESCRIPTION - DESCRIPTORS: DESCRIPTORS: SHOOTING;PRESSURE

## 2019-10-20 ASSESSMENT — PAIN SCALES - GENERAL
PAINLEVEL_OUTOF10: 8
PAINLEVEL_OUTOF10: 8

## 2019-10-20 ASSESSMENT — PAIN DESCRIPTION - ORIENTATION: ORIENTATION: RIGHT

## 2019-12-02 ENCOUNTER — TELEPHONE (OUTPATIENT)
Dept: BARIATRICS/WEIGHT MGMT | Age: 59
End: 2019-12-02

## 2019-12-17 ENCOUNTER — HOSPITAL ENCOUNTER (OUTPATIENT)
Age: 59
Discharge: HOME | End: 2019-12-19
Payer: MEDICARE

## 2019-12-17 ENCOUNTER — OFFICE VISIT (OUTPATIENT)
Dept: FAMILY MEDICINE CLINIC | Age: 59
End: 2019-12-17
Payer: MEDICARE

## 2019-12-17 VITALS
BODY MASS INDEX: 37.54 KG/M2 | WEIGHT: 174 LBS | TEMPERATURE: 98.1 F | DIASTOLIC BLOOD PRESSURE: 80 MMHG | SYSTOLIC BLOOD PRESSURE: 122 MMHG | RESPIRATION RATE: 16 BRPM | OXYGEN SATURATION: 98 % | HEART RATE: 74 BPM | HEIGHT: 57 IN

## 2019-12-17 DIAGNOSIS — I10 ESSENTIAL HYPERTENSION: ICD-10-CM

## 2019-12-17 DIAGNOSIS — Z12.11 SCREEN FOR COLON CANCER: ICD-10-CM

## 2019-12-17 DIAGNOSIS — I25.10 CAD IN NATIVE ARTERY: Chronic | ICD-10-CM

## 2019-12-17 DIAGNOSIS — R53.83 OTHER FATIGUE: ICD-10-CM

## 2019-12-17 DIAGNOSIS — Z12.31 SCREENING MAMMOGRAM, ENCOUNTER FOR: ICD-10-CM

## 2019-12-17 DIAGNOSIS — Z87.891 PERSONAL HISTORY OF NICOTINE DEPENDENCE: ICD-10-CM

## 2019-12-17 DIAGNOSIS — Z01.419 WOMEN'S ANNUAL ROUTINE GYNECOLOGICAL EXAMINATION: Primary | ICD-10-CM

## 2019-12-17 DIAGNOSIS — Z23 NEED FOR INFLUENZA VACCINATION: ICD-10-CM

## 2019-12-17 DIAGNOSIS — G47.33 OSA (OBSTRUCTIVE SLEEP APNEA): ICD-10-CM

## 2019-12-17 DIAGNOSIS — Z12.2 ENCOUNTER FOR SCREENING FOR LUNG CANCER: ICD-10-CM

## 2019-12-17 PROCEDURE — G0101 CA SCREEN;PELVIC/BREAST EXAM: HCPCS | Performed by: FAMILY MEDICINE

## 2019-12-17 PROCEDURE — G8598 ASA/ANTIPLAT THER USED: HCPCS | Performed by: FAMILY MEDICINE

## 2019-12-17 PROCEDURE — G8482 FLU IMMUNIZE ORDER/ADMIN: HCPCS | Performed by: FAMILY MEDICINE

## 2019-12-17 PROCEDURE — 99213 OFFICE O/P EST LOW 20 MIN: CPT | Performed by: FAMILY MEDICINE

## 2019-12-17 PROCEDURE — G8427 DOCREV CUR MEDS BY ELIG CLIN: HCPCS | Performed by: FAMILY MEDICINE

## 2019-12-17 PROCEDURE — 90686 IIV4 VACC NO PRSV 0.5 ML IM: CPT | Performed by: FAMILY MEDICINE

## 2019-12-17 PROCEDURE — 3017F COLORECTAL CA SCREEN DOC REV: CPT | Performed by: FAMILY MEDICINE

## 2019-12-17 PROCEDURE — G0008 ADMIN INFLUENZA VIRUS VAC: HCPCS | Performed by: FAMILY MEDICINE

## 2019-12-17 PROCEDURE — G8417 CALC BMI ABV UP PARAM F/U: HCPCS | Performed by: FAMILY MEDICINE

## 2019-12-17 PROCEDURE — G0123 SCREEN CERV/VAG THIN LAYER: HCPCS

## 2019-12-17 PROCEDURE — 4004F PT TOBACCO SCREEN RCVD TLK: CPT | Performed by: FAMILY MEDICINE

## 2019-12-17 RX ORDER — METOPROLOL SUCCINATE 100 MG/1
100 TABLET, EXTENDED RELEASE ORAL DAILY
Qty: 90 TABLET | Refills: 0 | Status: SHIPPED | OUTPATIENT
Start: 2019-12-17 | End: 2020-03-13 | Stop reason: SDUPTHER

## 2019-12-17 RX ORDER — OMEPRAZOLE 20 MG/1
20 CAPSULE, DELAYED RELEASE ORAL DAILY
Qty: 90 CAPSULE | Refills: 0 | Status: SHIPPED | OUTPATIENT
Start: 2019-12-17 | End: 2020-03-13 | Stop reason: SDUPTHER

## 2019-12-17 RX ORDER — ATORVASTATIN CALCIUM 80 MG/1
80 TABLET, FILM COATED ORAL DAILY
Qty: 90 TABLET | Refills: 0 | Status: SHIPPED
Start: 2019-12-17 | End: 2020-03-03

## 2019-12-17 RX ORDER — LOSARTAN POTASSIUM 50 MG/1
50 TABLET ORAL DAILY
Qty: 90 TABLET | Refills: 0 | Status: SHIPPED | OUTPATIENT
Start: 2019-12-17 | End: 2020-03-13 | Stop reason: SDUPTHER

## 2019-12-17 RX ORDER — AMLODIPINE BESYLATE 5 MG/1
5 TABLET ORAL DAILY
Qty: 90 TABLET | Refills: 0 | Status: SHIPPED | OUTPATIENT
Start: 2019-12-17 | End: 2020-03-13 | Stop reason: SDUPTHER

## 2019-12-17 RX ORDER — FLUTICASONE PROPIONATE 50 MCG
SPRAY, SUSPENSION (ML) NASAL
Qty: 1 BOTTLE | Refills: 5 | Status: SHIPPED
Start: 2019-12-17 | End: 2020-05-12 | Stop reason: SDUPTHER

## 2019-12-17 RX ORDER — CHLORTHALIDONE 25 MG/1
25 TABLET ORAL DAILY
Qty: 90 TABLET | Refills: 0 | Status: SHIPPED | OUTPATIENT
Start: 2019-12-17 | End: 2020-03-13 | Stop reason: SDUPTHER

## 2019-12-27 ENCOUNTER — HOSPITAL ENCOUNTER (OUTPATIENT)
Dept: CT IMAGING | Age: 59
Discharge: HOME | End: 2019-12-29
Payer: MEDICARE

## 2019-12-27 ENCOUNTER — HOSPITAL ENCOUNTER (OUTPATIENT)
Dept: MAMMOGRAPHY | Age: 59
Discharge: HOME | End: 2019-12-29
Payer: MEDICARE

## 2019-12-27 DIAGNOSIS — Z12.2 ENCOUNTER FOR SCREENING FOR LUNG CANCER: ICD-10-CM

## 2019-12-27 DIAGNOSIS — Z87.891 PERSONAL HISTORY OF NICOTINE DEPENDENCE: ICD-10-CM

## 2019-12-27 DIAGNOSIS — Z12.31 SCREENING MAMMOGRAM, ENCOUNTER FOR: ICD-10-CM

## 2019-12-27 PROCEDURE — G0297 LDCT FOR LUNG CA SCREEN: HCPCS

## 2019-12-27 PROCEDURE — 77067 SCR MAMMO BI INCL CAD: CPT

## 2020-03-03 RX ORDER — ATORVASTATIN CALCIUM 80 MG/1
TABLET, FILM COATED ORAL
Qty: 30 TABLET | Refills: 1 | Status: SHIPPED
Start: 2020-03-03 | End: 2020-03-30

## 2020-03-03 RX ORDER — ATORVASTATIN CALCIUM 80 MG/1
TABLET, FILM COATED ORAL
Qty: 90 TABLET | OUTPATIENT
Start: 2020-03-03

## 2020-03-13 RX ORDER — POTASSIUM CHLORIDE 1500 MG/1
20 TABLET, FILM COATED, EXTENDED RELEASE ORAL EVERY MORNING
Qty: 90 TABLET | Refills: 0 | Status: SHIPPED
Start: 2020-03-13 | End: 2020-05-12 | Stop reason: SDUPTHER

## 2020-03-13 RX ORDER — METOPROLOL SUCCINATE 100 MG/1
100 TABLET, EXTENDED RELEASE ORAL DAILY
Qty: 90 TABLET | Refills: 0 | Status: SHIPPED
Start: 2020-03-13 | End: 2020-03-31

## 2020-03-13 RX ORDER — LOSARTAN POTASSIUM 50 MG/1
50 TABLET ORAL DAILY
Qty: 90 TABLET | Refills: 0 | Status: SHIPPED
Start: 2020-03-13 | End: 2020-03-31

## 2020-03-13 RX ORDER — CLOPIDOGREL BISULFATE 75 MG/1
75 TABLET ORAL DAILY
Qty: 90 TABLET | Refills: 0 | Status: SHIPPED
Start: 2020-03-13 | End: 2020-05-12 | Stop reason: SDUPTHER

## 2020-03-13 RX ORDER — OMEPRAZOLE 20 MG/1
20 CAPSULE, DELAYED RELEASE ORAL DAILY
Qty: 90 CAPSULE | Refills: 0 | Status: SHIPPED
Start: 2020-03-13 | End: 2020-03-31

## 2020-03-13 RX ORDER — CHLORTHALIDONE 25 MG/1
25 TABLET ORAL DAILY
Qty: 90 TABLET | Refills: 0 | Status: SHIPPED
Start: 2020-03-13 | End: 2020-03-31

## 2020-03-13 RX ORDER — AMLODIPINE BESYLATE 5 MG/1
5 TABLET ORAL DAILY
Qty: 90 TABLET | Refills: 0 | Status: SHIPPED
Start: 2020-03-13 | End: 2020-03-31

## 2020-03-31 RX ORDER — OMEPRAZOLE 20 MG/1
20 CAPSULE, DELAYED RELEASE ORAL DAILY
Qty: 90 CAPSULE | Refills: 0 | Status: SHIPPED
Start: 2020-03-31 | End: 2020-05-12 | Stop reason: SDUPTHER

## 2020-03-31 RX ORDER — CHLORTHALIDONE 25 MG/1
25 TABLET ORAL DAILY
Qty: 90 TABLET | Refills: 0 | Status: SHIPPED
Start: 2020-03-31 | End: 2020-05-12 | Stop reason: SDUPTHER

## 2020-03-31 RX ORDER — METOPROLOL SUCCINATE 100 MG/1
100 TABLET, EXTENDED RELEASE ORAL DAILY
Qty: 90 TABLET | Refills: 0 | OUTPATIENT
Start: 2020-03-31 | End: 2020-06-29

## 2020-03-31 RX ORDER — ATORVASTATIN CALCIUM 80 MG/1
TABLET, FILM COATED ORAL
Qty: 90 TABLET | Refills: 0 | Status: SHIPPED
Start: 2020-03-31 | End: 2020-05-12 | Stop reason: SDUPTHER

## 2020-03-31 RX ORDER — AMLODIPINE BESYLATE 5 MG/1
5 TABLET ORAL DAILY
Qty: 90 TABLET | Refills: 0 | Status: SHIPPED
Start: 2020-03-31 | End: 2020-08-13 | Stop reason: SDUPTHER

## 2020-03-31 RX ORDER — LOSARTAN POTASSIUM 50 MG/1
50 TABLET ORAL DAILY
Qty: 90 TABLET | Refills: 0 | Status: SHIPPED
Start: 2020-03-31 | End: 2020-05-12 | Stop reason: SDUPTHER

## 2020-03-31 RX ORDER — METOPROLOL SUCCINATE 100 MG/1
TABLET, EXTENDED RELEASE ORAL
Qty: 90 TABLET | Refills: 0 | Status: SHIPPED
Start: 2020-03-31 | End: 2020-05-12 | Stop reason: SDUPTHER

## 2020-05-12 ENCOUNTER — VIRTUAL VISIT (OUTPATIENT)
Dept: FAMILY MEDICINE CLINIC | Age: 60
End: 2020-05-12
Payer: MEDICARE

## 2020-05-12 PROCEDURE — 3017F COLORECTAL CA SCREEN DOC REV: CPT | Performed by: FAMILY MEDICINE

## 2020-05-12 PROCEDURE — 4004F PT TOBACCO SCREEN RCVD TLK: CPT | Performed by: FAMILY MEDICINE

## 2020-05-12 PROCEDURE — G8417 CALC BMI ABV UP PARAM F/U: HCPCS | Performed by: FAMILY MEDICINE

## 2020-05-12 PROCEDURE — 99214 OFFICE O/P EST MOD 30 MIN: CPT | Performed by: FAMILY MEDICINE

## 2020-05-12 PROCEDURE — G8427 DOCREV CUR MEDS BY ELIG CLIN: HCPCS | Performed by: FAMILY MEDICINE

## 2020-05-12 RX ORDER — DICYCLOMINE HYDROCHLORIDE 10 MG/1
10 CAPSULE ORAL 4 TIMES DAILY
Qty: 120 CAPSULE | Refills: 1 | Status: SHIPPED
Start: 2020-05-12 | End: 2020-11-19 | Stop reason: SDUPTHER

## 2020-05-12 RX ORDER — CLOPIDOGREL BISULFATE 75 MG/1
75 TABLET ORAL DAILY
Qty: 90 TABLET | Refills: 0 | Status: SHIPPED
Start: 2020-05-12 | End: 2020-08-13 | Stop reason: SDUPTHER

## 2020-05-12 RX ORDER — ATORVASTATIN CALCIUM 80 MG/1
TABLET, FILM COATED ORAL
Qty: 90 TABLET | Refills: 0 | Status: SHIPPED
Start: 2020-05-12 | End: 2020-08-13 | Stop reason: SDUPTHER

## 2020-05-12 RX ORDER — METOPROLOL SUCCINATE 100 MG/1
TABLET, EXTENDED RELEASE ORAL
Qty: 90 TABLET | Refills: 0 | Status: SHIPPED
Start: 2020-05-12 | End: 2020-07-08 | Stop reason: SDUPTHER

## 2020-05-12 RX ORDER — CHLORTHALIDONE 25 MG/1
25 TABLET ORAL DAILY
Qty: 90 TABLET | Refills: 0 | Status: SHIPPED
Start: 2020-05-12 | End: 2020-08-13 | Stop reason: SDUPTHER

## 2020-05-12 RX ORDER — LOSARTAN POTASSIUM 50 MG/1
50 TABLET ORAL DAILY
Qty: 90 TABLET | Refills: 0 | Status: SHIPPED
Start: 2020-05-12 | End: 2020-08-13 | Stop reason: SDUPTHER

## 2020-05-12 RX ORDER — ARIPIPRAZOLE 10 MG/1
10 TABLET ORAL DAILY
COMMUNITY
Start: 2020-05-02 | End: 2020-11-19 | Stop reason: ALTCHOICE

## 2020-05-12 RX ORDER — ASPIRIN 81 MG/1
81 TABLET, CHEWABLE ORAL DAILY
Qty: 30 TABLET | Refills: 3 | Status: SHIPPED
Start: 2020-05-12 | End: 2020-08-13 | Stop reason: SDUPTHER

## 2020-05-12 RX ORDER — AZELASTINE 1 MG/ML
2 SPRAY, METERED NASAL 2 TIMES DAILY
Qty: 1 BOTTLE | Refills: 3 | Status: SHIPPED
Start: 2020-05-12 | End: 2020-11-19 | Stop reason: SDUPTHER

## 2020-05-12 RX ORDER — POTASSIUM CHLORIDE 1500 MG/1
20 TABLET, FILM COATED, EXTENDED RELEASE ORAL EVERY MORNING
Qty: 90 TABLET | Refills: 0 | Status: SHIPPED
Start: 2020-05-12 | End: 2020-08-13 | Stop reason: SDUPTHER

## 2020-05-12 RX ORDER — FLUTICASONE PROPIONATE 50 MCG
SPRAY, SUSPENSION (ML) NASAL
Qty: 1 BOTTLE | Refills: 5 | Status: SHIPPED
Start: 2020-05-12 | End: 2020-11-19 | Stop reason: SDUPTHER

## 2020-05-12 RX ORDER — ZOLPIDEM TARTRATE 5 MG/1
5 TABLET ORAL NIGHTLY PRN
COMMUNITY
Start: 2020-05-09

## 2020-05-12 RX ORDER — OMEPRAZOLE 20 MG/1
20 CAPSULE, DELAYED RELEASE ORAL DAILY
Qty: 90 CAPSULE | Refills: 0 | Status: SHIPPED
Start: 2020-05-12 | End: 2020-08-13 | Stop reason: SDUPTHER

## 2020-05-12 NOTE — PROGRESS NOTES
MG SL tablet up to max of 3 total doses. If no relief after 1 dose, call 911. 25 tablet 3    ranolazine (RANEXA) 500 MG extended release tablet Take 1 tablet by mouth 2 times daily 60 tablet 3    Multiple Vitamins-Minerals (THERAPEUTIC MULTIVITAMIN-MINERALS) tablet Take 1 tablet by mouth daily       No current facility-administered medications for this visit.          Past Medical/Surgical Hx;  Reviewed with patient         Diagnosis Date    Anxiety and depression 9/11/2013    Baker's cyst, ruptured     CAD (coronary artery disease)     Chronic back pain     ankylosing spondylitis , RA    Chronic myocarditis ([address] 12/1/2016    Diverticulosis of sigmoid colon     Duodenal ulcer     Facet arthropathy     Fibrocystic breast changes     Fracture of metatarsal bone     RIGHT FOOT    Genital warts     GERD (gastroesophageal reflux disease)     Heart attack ([address] 09/15/2018    History of blood transfusion     loss of blood during pregnancy    History of cervical dysplasia 12/16/2013    Hyperlipidemia     Hypertension     IGT (impaired glucose tolerance) 11/11/2015    Myocarditis ([facility] [address]     Dr. Harsha Barajas    Obesity     OLIVIA (obstructive sleep apnea)     CPAP    Osteoarthritis of multiple joints 8/13/2013    Osteomyelitis ([address]     Primary insomnia 11/11/2015    Protruded cervical disc/DDD with neural foraminal stenosis 6/10/2015    Pulmonary nodule 9/27/2016    Sacroiliitis ([address] 6/2/2014    Shingles     Tobacco abuse     Valvular heart disease 11/30/2016    Per cardiac MRI 11/8/16-Mild TR/Mild AI Follows with [facility] Dr Harsha Barajas     Past Surgical History:   Procedure Laterality Date    BUNIONECTOMY  great toe    CARDIAC CATHETERIZATION  09/24/2018    COLONOSCOPY  8/20/12    Dr. Dominga Stewart  11/2016    Dr Veda Angelucci great toe/skin    FOOT SURGERY      HAMMER TOE SURGERY      HYSTERECTOMY, TOTAL ABDOMINAL  2001    elective due to abnormal cells    JOINT REPLACEMENT Right Years: 40.00     Pack years: 40.00     Types: Cigarettes     Last attempt to quit: 2018     Years since quittin.6    Smokeless tobacco: Never Used    Tobacco comment: started at age 21-quit three times previously   Substance Use Topics    Alcohol use: No     Alcohol/week: 0.0 standard drinks       Immunization History   Administered Date(s) Administered    Influenza 10/28/2013    Influenza Vaccine, unspecified formulation 10/28/2013, 10/03/2017    Influenza Virus Vaccine 2015, 2016    Influenza, High Dose (Fluzone 65 yrs and older) 10/18/2017    Influenza, Quadv, IM, (6 mo and older Fluzone, Flulaval, Fluarix and 3 yrs and older Afluria) 2016    Influenza, Quadv, IM, PF (6 mo and older Fluzone, Flulaval, Fluarix, and 3 yrs and older Afluria) 10/03/2017, 2019    Pneumococcal Conjugate 13-valent ([id number]) 2017    Pneumococcal Polysaccharide ([id number]) 2019    Tdap (Boostrix, Adacel) 2017       Review of Systems  Review of Systems   Constitutional: Positive for fatigue. Negative for activity change, appetite change, fever and unexpected weight change. Respiratory: Positive for cough. Negative for shortness of breath. Cardiovascular: Negative for chest pain. Gastrointestinal: Positive for abdominal distention and diarrhea. Negative for blood in stool. Genitourinary: Negative for difficulty urinating. Psychiatric/Behavioral: Positive for dysphoric mood. PE:  VS:  /84 Comment: home reading  Breastfeeding No   Physical Exam  Alert and oriented  No acute distress    Noted to have repetitive movement of her jaw durign conversation    Assessment/Plan:  Yuliana López was seen today for hypertension. Diagnoses and all orders for this visit:    Diarrhea with Incontinence of feces with fecal urgency  Refer to GI  Trial of bentyl  -     dicyclomine (BENTYL) 10 MG capsule;  Take 1 capsule by mouth 4 times daily Prior to meals    CAD in native

## 2020-05-24 VITALS — SYSTOLIC BLOOD PRESSURE: 115 MMHG | DIASTOLIC BLOOD PRESSURE: 84 MMHG

## 2020-05-24 ASSESSMENT — ENCOUNTER SYMPTOMS
SHORTNESS OF BREATH: 0
ABDOMINAL DISTENTION: 1
DIARRHEA: 1
BLOOD IN STOOL: 0
COUGH: 1

## 2020-06-01 ENCOUNTER — HOSPITAL ENCOUNTER (OUTPATIENT)
Age: 60
Discharge: HOME | End: 2020-06-01
Payer: MEDICARE

## 2020-06-01 LAB — GIARDIA ANTIGEN STOOL: NORMAL

## 2020-06-01 PROCEDURE — 89055 LEUKOCYTE ASSESSMENT FECAL: CPT

## 2020-06-01 PROCEDURE — 87045 FECES CULTURE AEROBIC BACT: CPT

## 2020-06-01 PROCEDURE — 83520 IMMUNOASSAY QUANT NOS NONAB: CPT

## 2020-06-01 PROCEDURE — 87077 CULTURE AEROBIC IDENTIFY: CPT

## 2020-06-01 PROCEDURE — 87329 GIARDIA AG IA: CPT

## 2020-06-02 LAB — WHITE BLOOD CELLS (WBC), STOOL: NORMAL

## 2020-06-03 LAB — CULTURE, STOOL: NORMAL

## 2020-06-05 LAB — PANCREATIC ELASTASE, FECAL: 391 UG/G

## 2020-07-08 RX ORDER — METOPROLOL SUCCINATE 100 MG/1
TABLET, EXTENDED RELEASE ORAL
Qty: 90 TABLET | Refills: 0 | Status: SHIPPED
Start: 2020-07-08 | End: 2020-11-04 | Stop reason: SDUPTHER

## 2020-08-13 ENCOUNTER — OFFICE VISIT (OUTPATIENT)
Dept: FAMILY MEDICINE CLINIC | Age: 60
End: 2020-08-13
Payer: MEDICARE

## 2020-08-13 VITALS
BODY MASS INDEX: 35.81 KG/M2 | HEART RATE: 66 BPM | WEIGHT: 166 LBS | RESPIRATION RATE: 18 BRPM | SYSTOLIC BLOOD PRESSURE: 120 MMHG | OXYGEN SATURATION: 95 % | HEIGHT: 57 IN | DIASTOLIC BLOOD PRESSURE: 81 MMHG | TEMPERATURE: 97.3 F

## 2020-08-13 LAB — HBA1C MFR BLD: 5.9 %

## 2020-08-13 PROCEDURE — 99213 OFFICE O/P EST LOW 20 MIN: CPT | Performed by: FAMILY MEDICINE

## 2020-08-13 PROCEDURE — 83036 HEMOGLOBIN GLYCOSYLATED A1C: CPT | Performed by: FAMILY MEDICINE

## 2020-08-13 RX ORDER — AMLODIPINE BESYLATE 5 MG/1
5 TABLET ORAL DAILY
Qty: 90 TABLET | Refills: 0 | Status: SHIPPED
Start: 2020-08-13 | End: 2020-11-04 | Stop reason: SDUPTHER

## 2020-08-13 RX ORDER — LOSARTAN POTASSIUM 50 MG/1
50 TABLET ORAL DAILY
Qty: 90 TABLET | Refills: 0 | Status: SHIPPED
Start: 2020-08-13 | End: 2020-11-04 | Stop reason: SDUPTHER

## 2020-08-13 RX ORDER — ATORVASTATIN CALCIUM 80 MG/1
TABLET, FILM COATED ORAL
Qty: 90 TABLET | Refills: 0 | Status: SHIPPED
Start: 2020-08-13 | End: 2020-11-04 | Stop reason: SDUPTHER

## 2020-08-13 RX ORDER — OMEPRAZOLE 20 MG/1
20 CAPSULE, DELAYED RELEASE ORAL DAILY
Qty: 90 CAPSULE | Refills: 0 | Status: SHIPPED
Start: 2020-08-13 | End: 2020-11-04 | Stop reason: SDUPTHER

## 2020-08-13 RX ORDER — ASPIRIN 81 MG/1
81 TABLET, CHEWABLE ORAL DAILY
Qty: 30 TABLET | Refills: 3 | Status: SHIPPED
Start: 2020-08-13 | End: 2021-01-05

## 2020-08-13 RX ORDER — AZELASTINE 1 MG/ML
2 SPRAY, METERED NASAL 2 TIMES DAILY
Qty: 1 BOTTLE | Refills: 3 | Status: CANCELLED | OUTPATIENT
Start: 2020-08-13

## 2020-08-13 RX ORDER — CLOPIDOGREL BISULFATE 75 MG/1
75 TABLET ORAL DAILY
Qty: 90 TABLET | Refills: 0 | Status: SHIPPED
Start: 2020-08-13 | End: 2020-11-04 | Stop reason: SDUPTHER

## 2020-08-13 RX ORDER — POTASSIUM CHLORIDE 1500 MG/1
20 TABLET, FILM COATED, EXTENDED RELEASE ORAL EVERY MORNING
Qty: 90 TABLET | Refills: 0 | Status: SHIPPED
Start: 2020-08-13 | End: 2020-11-03 | Stop reason: SDUPTHER

## 2020-08-13 RX ORDER — CHLORTHALIDONE 25 MG/1
25 TABLET ORAL DAILY
Qty: 90 TABLET | Refills: 0 | Status: SHIPPED
Start: 2020-08-13 | End: 2020-11-04 | Stop reason: SDUPTHER

## 2020-08-13 ASSESSMENT — ENCOUNTER SYMPTOMS
DIARRHEA: 0
CONSTIPATION: 0
NAUSEA: 0
COUGH: 0
VOMITING: 0
ABDOMINAL PAIN: 0
PHOTOPHOBIA: 0
SHORTNESS OF BREATH: 0

## 2020-08-13 NOTE — PROGRESS NOTES
S: 64 y.o. female with   Chief Complaint   Patient presents with    Pre-op Exam       Pt is here for a pre-op evaluation. Needs a toe amputation. Hx of HTN, CAD - had stress test recently. BP Readings from Last 3 Encounters:   08/13/20 120/81   05/12/20 115/84   12/17/19 122/80       O: VS:  height is 4' 9\" (1.448 m) and weight is 166 lb (75.3 kg). Her temperature is 97.3 °F (36.3 °C). Her blood pressure is 120/81 and her pulse is 66. Her respiration is 18 and oxygen saturation is 95%. As per resident note. Impression/Plan:   1) pre-op evaluation - hold ACE, plavix, lasix. Cont ASA. Health Maintenance Due   Topic Date Due    Shingles Vaccine (1 of 2) 08/14/2012    Annual Wellness Visit (AWV)  04/03/2019    Lipid screen  09/24/2019    Diabetes screen  07/20/2020    Potassium monitoring  08/23/2020    Creatinine monitoring  08/23/2020         Attending Physician Statement  I have discussed the case, including pertinent history and exam findings with the resident. I agree with the documented assessment and plan.       Elvia Trujillo MD

## 2020-08-13 NOTE — PROGRESS NOTES
Chief Complaint   Patient presents with    Pre-op Exam       HPI:  The patient is a 64 y.o. female who presented to the office today for pre op evaluation for foot surgery on 8/20/2020. HTN  /81  Current regimen: amlodipine 5 mg, chlortalidone 25 mg, furosemide 20 mg qd, losartan 50 mg, metoprolol 100 mg  Tolerating meds   No hypotension   Consistent intake of med   Denies CP/blurry vision/claudication   Measures at home: well controlled   BP Readings from Last 3 Encounters:   08/13/20 120/81   05/12/20 115/84   12/17/19 122/80     Lab Results   Component Value Date    LABA1C 5.9 08/13/2020     MI two years ago. No stents placed. No residual damage per patient. Follows up with cardiology q3m Dr. Prachi Rodriguez. Medication stress test 7-8 months ago - looked good per patient  No chest pain, no exertional chest pain or shortness of breath    Ambien used once a week if even . Pre-Operative Risk assessment using 2014 ACC/AHA guidelines     Emergent procedure No  Active Cardiac Condition No (decompensated HF, Arrhythmia, MI <3 weeks, severe valve disease)  Risk Level of Procedure Intermediate Risk (intraperitoneal, intrathoracic, HENT, orthopedic, or carotid endarterectomy, etc.)  Revised Cardiac Risk Index Risk factors: History of ischemic heart disease  Measurement of Exercise Tolerance before Surgery >4 Yes    According to the 2014 ACC/AHA pre-operative risk assessment guidelines Tiffany Peñaloza is a low risk for major cardiac complications during a intermediate risk procedure and may continue as planned. Specific medication recommendations are listed below. Medications recommended to continue should be taken with a sip of water even when NPO.      Further recommendations from consultants: None    Medication Recommendations:  ACEI/ARB Hold one dose piror to surgery  Betablocker should be continued the day of surgery   Calcium Channel Blocker should be continued the day of surgery   Diuretics HOLD the morning dose on the day of surgery  Statins should be continued the day of surgery  ASA should be continued the day of surgery      Health Maintenance:  Health Maintenance Due   Topic Date Due    Shingles Vaccine (1 of 2) 08/14/2012    Annual Wellness Visit (AWV)  04/03/2019    Lipid screen  09/24/2019    Diabetes screen  07/20/2020    Potassium monitoring  08/23/2020    Creatinine monitoring  08/23/2020       Past Medical History:   Diagnosis Date    Anxiety and depression 9/11/2013    Baker's cyst, ruptured     CAD (coronary artery disease)     Chronic back pain     ankylosing spondylitis , RA    Chronic myocarditis ([facility] [address] 12/1/2016    Diverticulosis of sigmoid colon     Duodenal ulcer     Facet arthropathy     Fibrocystic breast changes     Fracture of metatarsal bone     RIGHT FOOT    Genital warts     GERD (gastroesophageal reflux disease)     Heart attack ([facility] [address] 09/15/2018    History of blood transfusion     loss of blood during pregnancy    History of cervical dysplasia 12/16/2013    Hyperlipidemia     Hypertension     IGT (impaired glucose tolerance) 11/11/2015    Myocarditis ([facility] [address]     Dr. Alison Tovar    Obesity     OLIVIA (obstructive sleep apnea)     CPAP    Osteoarthritis of multiple joints 8/13/2013    Osteomyelitis ([facility] [address]     Primary insomnia 11/11/2015    Protruded cervical disc/DDD with neural foraminal stenosis 6/10/2015    Pulmonary nodule 9/27/2016    Sacroiliitis ([facility] [address] 6/2/2014    Shingles     Tobacco abuse     Valvular heart disease 11/30/2016    Per cardiac MRI 11/8/16-Mild TR/Mild AI Follows with [facility] Dr Alison Tovar       BP Readings from Last 3 Encounters:   08/13/20 120/81   05/12/20 115/84   12/17/19 122/80          Current Outpatient Medications:     aspirin 81 MG chewable tablet, Take 1 tablet by mouth daily, Disp: 30 tablet, Rfl: 3    atorvastatin (LIPITOR) 80 MG tablet, TAKE 1 TABLET BY MOUTH DAILY, Disp: 90 tablet, Rfl: 0    chlorthalidone (HYGROTON) 25 MG   NARCAN 4 MG/0.1ML LIQD nasal spray, as needed, Disp: , Rfl: 0    Cholecalciferol (VITAMIN D3) 2000 units CAPS, Take 1 capsule by mouth daily, Disp: 30 capsule, Rfl: 5    HYDROcodone-acetaminophen (NORCO)  MG per tablet, 1 tablet 4 times daily. Indications: Pain management , Disp: , Rfl:     nitroGLYCERIN (NITROSTAT) 0.4 MG SL tablet, up to max of 3 total doses.  If no relief after 1 dose, call 911., Disp: 25 tablet, Rfl: 3    ranolazine (RANEXA) 500 MG extended release tablet, Take 1 tablet by mouth 2 times daily, Disp: 60 tablet, Rfl: 3    Multiple Vitamins-Minerals (THERAPEUTIC MULTIVITAMIN-MINERALS) tablet, Take 1 tablet by mouth daily, Disp: , Rfl:     No Known Allergies    Family History   Problem Relation Age of Onset    Arthritis Mother     High Blood Pressure Mother     Rheum Arthritis Mother     High Blood Pressure Father     Other Father         emphysema    Osteoarthritis Father     High Blood Pressure Brother     Depression Daughter     High Blood Pressure Brother     No Known Problems Son        Past Surgical History:   Procedure Laterality Date    BUNIONECTOMY  great toe    CARDIAC CATHETERIZATION  09/24/2018    COLONOSCOPY  8/20/12    Dr. Tonny Yoon  11/2016    Dr Frankey Lynn great toe/skin    FOOT SURGERY      HAMMER TOE SURGERY      HYSTERECTOMY, TOTAL ABDOMINAL  2001    elective due to abnormal cells    JOINT REPLACEMENT Right     Knee    KNEE SURGERY Bilateral     arthroscopy    NERVE BLOCK  07/16/14    right sacroiliac joint #1    NERVE BLOCK Right 11/3/14    SI injection #2    NERVE BLOCK Right 11 19 14    si inj #3    NERVE BLOCK Bilateral 4/22/2015    greater occipital nerve block  #1    NERVE BLOCK Bilateral 5/6/15    greater occipital nerve block #2    NERVE BLOCK Bilateral 05/13/15    greater occipital nerve block #3    NERVE BLOCK Right 6/10/15    cervical transforaminal #1    NERVE BLOCK      Pain Management    NERVE BLOCK dizziness, light-headedness and headaches. Physical Exam  Vitals signs reviewed. Constitutional:       General: She is not in acute distress. Appearance: She is well-developed. She is not diaphoretic. HENT:      Head: Normocephalic and atraumatic. Eyes:      Conjunctiva/sclera: Conjunctivae normal.   Neck:      Musculoskeletal: Neck supple. Cardiovascular:      Rate and Rhythm: Normal rate and regular rhythm. Heart sounds: Normal heart sounds. No murmur. No friction rub. No gallop. Pulmonary:      Effort: Pulmonary effort is normal. No respiratory distress. Breath sounds: Normal breath sounds. No wheezing or rales. Abdominal:      General: There is no distension. Palpations: Abdomen is soft. Tenderness: There is no abdominal tenderness. There is no guarding or rebound. Musculoskeletal:      Right lower leg: No edema. Left lower leg: No edema. Skin:     General: Skin is warm. Neurological:      General: No focal deficit present. Mental Status: She is alert and oriented to person, place, and time. Psychiatric:         Mood and Affect: Mood normal.         Behavior: Behavior normal.         ASSESSMENT      Diagnosis Orders   1. Essential hypertension  chlorthalidone (HYGROTON) 25 MG tablet    losartan (COZAAR) 50 MG tablet    potassium chloride (KLOR-CON M) 20 MEQ TBCR extended release tablet    amLODIPine (NORVASC) 5 MG tablet   2. Class 2 obesity due to excess calories without serious comorbidity with body mass index (BMI) of 37.0 to 37.9 in adult  POCT glycosylated hemoglobin (Hb A1C)   3. CAD in native artery  atorvastatin (LIPITOR) 80 MG tablet    losartan (COZAAR) 50 MG tablet   4. Chronic cough           PLAN:     Orders Placed This Encounter   Procedures    POCT glycosylated hemoglobin (Hb A1C)       Crystal was seen today for pre-op exam.    Diagnoses and all orders for this visit:    Essential hypertension  Well controlled today and at home.  Hold Losartan  And furosemide the day of surgery. Refill meds. -     chlorthalidone (HYGROTON) 25 MG tablet; Take 1 tablet by mouth daily  -     losartan (COZAAR) 50 MG tablet; Take 1 tablet by mouth daily  -     potassium chloride (KLOR-CON M) 20 MEQ TBCR extended release tablet; Take 1 tablet by mouth every morning  -     amLODIPine (NORVASC) 5 MG tablet; Take 1 tablet by mouth daily    Class 2 obesity due to excess calories without serious comorbidity with body mass index (BMI) of 37.0 to 37.9 in adult  -     POCT glycosylated hemoglobin (Hb A1C):     CAD in native artery  Continue statin day of surgery. Hold losartan day of.   -     atorvastatin (LIPITOR) 80 MG tablet; TAKE 1 TABLET BY MOUTH DAILY  -     losartan (COZAAR) 50 MG tablet; Take 1 tablet by mouth daily    Other orders  Hold clopidogrel starting now. Continue aspirin due to cardiac hx. Refill meds. -     aspirin 81 MG chewable tablet; Take 1 tablet by mouth daily  -     clopidogrel (PLAVIX) 75 MG tablet; Take 1 tablet by mouth daily  -     omeprazole (PRILOSEC) 20 MG delayed release capsule; Take 1 capsule by mouth Daily    Paper work filled out and sent to office. Crystal received counseling on the following healthy behaviors: medication adherence. Call or go to ED immediately if symptoms worsen or persist.  Return if symptoms worsen or fail to improve, for next available appointment with Dr. Zoraida He after two weeks from today . , or sooner if necessary. Counseled regarding above diagnosis, including possible risks and complications,  especially if left uncontrolled. Counseled regarding the possible side effects, risks, benefits and alternatives to treatment; patient verbalizes understanding, agrees, feels comfortable with and wishes to proceed with above treatment plan.     Advised patient to call with any new medication issues, and read all Rx info from pharmacy to assure aware of all possible risks and side effects of medication before taking. Patient verbalizes understanding and agrees with above counseling, assessment and plan. All questions answered. Crystal was instructed to call if any new symptoms develop prior to next visit.        Aria Bush M.D  Family Medicine Resident PGY-3  [facility]   8/13/20

## 2020-08-20 ENCOUNTER — HOSPITAL ENCOUNTER (OUTPATIENT)
Age: 60
Discharge: HOME | End: 2020-08-22
Payer: MEDICARE

## 2020-08-20 PROCEDURE — 88305 TISSUE EXAM BY PATHOLOGIST: CPT

## 2020-08-20 PROCEDURE — 88311 DECALCIFY TISSUE: CPT

## 2020-09-14 ENCOUNTER — HOSPITAL ENCOUNTER (OUTPATIENT)
Age: 60
Discharge: HOME | End: 2020-09-14
Payer: MEDICARE

## 2020-09-14 LAB
ALBUMIN SERPL-MCNC: 3.6 G/DL (ref 3.5–5.2)
ALP BLD-CCNC: 93 U/L (ref 35–104)
ALT SERPL-CCNC: 23 U/L (ref 0–32)
AST SERPL-CCNC: 15 U/L (ref 0–31)
BILIRUB SERPL-MCNC: 0.2 MG/DL (ref 0–1.2)
BILIRUBIN DIRECT: <0.2 MG/DL (ref 0–0.3)
BILIRUBIN, INDIRECT: NORMAL MG/DL (ref 0–1)
BUN BLDV-MCNC: 31 MG/DL (ref 6–20)
CREAT SERPL-MCNC: 0.9 MG/DL (ref 0.5–1)
GFR AFRICAN AMERICAN: >60
GFR NON-AFRICAN AMERICAN: >60 ML/MIN/1.73
TOTAL PROTEIN: 6.6 G/DL (ref 6.4–8.3)

## 2020-09-14 PROCEDURE — 80076 HEPATIC FUNCTION PANEL: CPT

## 2020-09-14 PROCEDURE — 36415 COLL VENOUS BLD VENIPUNCTURE: CPT

## 2020-09-14 PROCEDURE — 84520 ASSAY OF UREA NITROGEN: CPT

## 2020-09-14 PROCEDURE — 82565 ASSAY OF CREATININE: CPT

## 2020-09-22 LAB
BASOPHILS ABSOLUTE: NORMAL
BASOPHILS RELATIVE PERCENT: NORMAL
BUN BLDV-MCNC: 18 MG/DL
CALCIUM SERPL-MCNC: 9.4 MG/DL
CHLORIDE BLD-SCNC: 100 MMOL/L
CHOLESTEROL, TOTAL: 170 MG/DL
CHOLESTEROL/HDL RATIO: 4
CO2: 29 MMOL/L
CREAT SERPL-MCNC: 0.78 MG/DL
EOSINOPHILS ABSOLUTE: NORMAL
EOSINOPHILS RELATIVE PERCENT: NORMAL
GFR CALCULATED: NORMAL
GLUCOSE BLD-MCNC: 106 MG/DL
HCT VFR BLD CALC: 46 % (ref 36–46)
HDLC SERPL-MCNC: 42 MG/DL (ref 35–70)
HEMOGLOBIN: 15 G/DL (ref 12–16)
LDL CHOLESTEROL CALCULATED: 107 MG/DL (ref 0–160)
LYMPHOCYTES ABSOLUTE: NORMAL
LYMPHOCYTES RELATIVE PERCENT: NORMAL
MAGNESIUM: 1.8 MG/DL
MCH RBC QN AUTO: NORMAL PG
MCHC RBC AUTO-ENTMCNC: NORMAL G/DL
MCV RBC AUTO: NORMAL FL
MONOCYTES ABSOLUTE: NORMAL
MONOCYTES RELATIVE PERCENT: NORMAL
NEUTROPHILS ABSOLUTE: NORMAL
NEUTROPHILS RELATIVE PERCENT: NORMAL
NONHDLC SERPL-MCNC: NORMAL MG/DL
PDW BLD-RTO: NORMAL %
PLATELET # BLD: 311 K/ΜL
PMV BLD AUTO: NORMAL FL
POTASSIUM SERPL-SCNC: 3.2 MMOL/L
RBC # BLD: 4.95 10^6/ΜL
SODIUM BLD-SCNC: 140 MMOL/L
TRIGL SERPL-MCNC: 103 MG/DL
VLDLC SERPL CALC-MCNC: NORMAL MG/DL
WBC # BLD: 13.5 10^3/ML

## 2020-09-23 ENCOUNTER — TELEPHONE (OUTPATIENT)
Dept: FAMILY MEDICINE CLINIC | Age: 60
End: 2020-09-23

## 2020-09-23 NOTE — TELEPHONE ENCOUNTER
[place] at [address]. office called [phone] [phone]  - patient is scheduled for a [facility] on 9/29/20.  - WBC elevated at 13.5- they are unsure why would like to know 's recommendations. - they contacted patient she is not having any symptoms of illness or UTI.   - patient had foot surgery in August, no complications with this and it is healed    (lab results abstracted)

## 2020-09-23 NOTE — TELEPHONE ENCOUNTER
I looked over her WBC trend. She has tended on the upper end of normal. If not having symptoms, I do not think this would preclude her from having the procedure. Would let pt know directly. If she is interested, she may benefit from seeing a hematologist to discuss the reason for her mildly high wbc - often this is due to tobacco use or obesity.

## 2020-10-13 ENCOUNTER — TELEPHONE (OUTPATIENT)
Dept: FAMILY MEDICINE CLINIC | Age: 60
End: 2020-10-13

## 2020-10-13 NOTE — TELEPHONE ENCOUNTER
Last Appointment   8/13/2020  Next Appointment  11/19/2020      Patient would like a refill on Nicoderm patch. Smoking 3-4 ciggs. Per day.

## 2020-11-03 NOTE — TELEPHONE ENCOUNTER
Last Appointment   8/13/2020  Next Appointment  11/19/2020    Patient requesting refill for several medications. She will be due before upcoming appointment.   Med pending drs approval.

## 2020-11-04 RX ORDER — OMEPRAZOLE 20 MG/1
20 CAPSULE, DELAYED RELEASE ORAL DAILY
Qty: 90 CAPSULE | Refills: 0 | Status: SHIPPED
Start: 2020-11-04 | End: 2020-11-19

## 2020-11-04 RX ORDER — POTASSIUM CHLORIDE 1500 MG/1
20 TABLET, FILM COATED, EXTENDED RELEASE ORAL EVERY MORNING
Qty: 90 TABLET | Refills: 0 | Status: SHIPPED
Start: 2020-11-04 | End: 2021-01-21 | Stop reason: SDUPTHER

## 2020-11-04 RX ORDER — AMLODIPINE BESYLATE 5 MG/1
5 TABLET ORAL DAILY
Qty: 90 TABLET | Refills: 0 | Status: SHIPPED
Start: 2020-11-04 | End: 2021-01-21 | Stop reason: SDUPTHER

## 2020-11-04 RX ORDER — METOPROLOL SUCCINATE 100 MG/1
TABLET, EXTENDED RELEASE ORAL
Qty: 90 TABLET | Refills: 0 | Status: SHIPPED
Start: 2020-11-04 | End: 2021-01-21 | Stop reason: SDUPTHER

## 2020-11-04 RX ORDER — ATORVASTATIN CALCIUM 80 MG/1
TABLET, FILM COATED ORAL
Qty: 90 TABLET | Refills: 0 | Status: SHIPPED
Start: 2020-11-04 | End: 2021-01-21 | Stop reason: SDUPTHER

## 2020-11-04 RX ORDER — CLOPIDOGREL BISULFATE 75 MG/1
75 TABLET ORAL DAILY
Qty: 90 TABLET | Refills: 0 | Status: SHIPPED
Start: 2020-11-04 | End: 2021-01-21 | Stop reason: SDUPTHER

## 2020-11-04 RX ORDER — CHLORTHALIDONE 25 MG/1
25 TABLET ORAL DAILY
Qty: 90 TABLET | Refills: 0 | Status: SHIPPED
Start: 2020-11-04 | End: 2021-01-21 | Stop reason: SDUPTHER

## 2020-11-04 RX ORDER — LOSARTAN POTASSIUM 50 MG/1
50 TABLET ORAL DAILY
Qty: 90 TABLET | Refills: 0 | Status: SHIPPED
Start: 2020-11-04 | End: 2021-01-21 | Stop reason: SDUPTHER

## 2020-11-12 ENCOUNTER — TELEPHONE (OUTPATIENT)
Dept: FAMILY MEDICINE CLINIC | Age: 60
End: 2020-11-12

## 2020-11-12 NOTE — TELEPHONE ENCOUNTER
Patient called stating she felt \"funny\" so she took her BP. States her reading was 113/54 and her pulse was 54. No dizziness, headaches, lightheadedness. No CP or SOB. Compared to her eKG from 8/2019 pulse is not significantly changed. Advised her that these are normal readings. If she has any episodes of syncope or gets SOB advised to go to the ER otherwise ok to follow up closely with PCP as needed.     Electronically signed by Semaj Thornton MD on 11/12/2020 at 6:54 PM

## 2020-11-19 ENCOUNTER — OFFICE VISIT (OUTPATIENT)
Dept: FAMILY MEDICINE CLINIC | Age: 60
End: 2020-11-19
Payer: MEDICARE

## 2020-11-19 ENCOUNTER — TELEPHONE (OUTPATIENT)
Dept: ADMINISTRATIVE | Age: 60
End: 2020-11-19

## 2020-11-19 VITALS
BODY MASS INDEX: 36.03 KG/M2 | WEIGHT: 167 LBS | HEART RATE: 50 BPM | TEMPERATURE: 97.1 F | HEIGHT: 57 IN | SYSTOLIC BLOOD PRESSURE: 104 MMHG | RESPIRATION RATE: 16 BRPM | DIASTOLIC BLOOD PRESSURE: 66 MMHG | OXYGEN SATURATION: 98 %

## 2020-11-19 PROCEDURE — 90686 IIV4 VACC NO PRSV 0.5 ML IM: CPT | Performed by: FAMILY MEDICINE

## 2020-11-19 PROCEDURE — 99213 OFFICE O/P EST LOW 20 MIN: CPT | Performed by: FAMILY MEDICINE

## 2020-11-19 PROCEDURE — G8431 POS CLIN DEPRES SCRN F/U DOC: HCPCS | Performed by: FAMILY MEDICINE

## 2020-11-19 PROCEDURE — G0008 ADMIN INFLUENZA VIRUS VAC: HCPCS | Performed by: FAMILY MEDICINE

## 2020-11-19 PROCEDURE — G0438 PPPS, INITIAL VISIT: HCPCS | Performed by: FAMILY MEDICINE

## 2020-11-19 PROCEDURE — G0296 VISIT TO DETERM LDCT ELIG: HCPCS | Performed by: FAMILY MEDICINE

## 2020-11-19 RX ORDER — FLUTICASONE PROPIONATE 50 MCG
SPRAY, SUSPENSION (ML) NASAL
Qty: 3 BOTTLE | Refills: 3 | Status: SHIPPED
Start: 2020-11-19 | End: 2021-09-23

## 2020-11-19 RX ORDER — ETANERCEPT 25 MG
25 KIT SUBCUTANEOUS ONCE
Qty: 25 MG | Refills: 0 | Status: SHIPPED
Start: 2020-11-19 | End: 2021-01-05

## 2020-11-19 RX ORDER — ACETAMINOPHEN 160 MG
2000 TABLET,DISINTEGRATING ORAL DAILY
Qty: 90 CAPSULE | Refills: 3 | Status: SHIPPED
Start: 2020-11-19 | End: 2022-04-01 | Stop reason: SDUPTHER

## 2020-11-19 RX ORDER — ALBUTEROL SULFATE 90 UG/1
AEROSOL, METERED RESPIRATORY (INHALATION)
Qty: 3 INHALER | Refills: 3 | Status: SHIPPED
Start: 2020-11-19 | End: 2022-04-01 | Stop reason: SDUPTHER

## 2020-11-19 RX ORDER — MELOXICAM 15 MG/1
15 TABLET ORAL DAILY
COMMUNITY
Start: 2020-11-19 | End: 2021-09-23

## 2020-11-19 RX ORDER — OMEPRAZOLE 40 MG/1
40 CAPSULE, DELAYED RELEASE ORAL DAILY
Qty: 90 CAPSULE | Refills: 0 | Status: SHIPPED
Start: 2020-11-19 | End: 2021-01-21 | Stop reason: SDUPTHER

## 2020-11-19 RX ORDER — DICYCLOMINE HYDROCHLORIDE 10 MG/1
10 CAPSULE ORAL 4 TIMES DAILY
Qty: 120 CAPSULE | Refills: 1 | Status: SHIPPED
Start: 2020-11-19 | End: 2021-01-11

## 2020-11-19 RX ORDER — RANOLAZINE 1000 MG/1
1000 TABLET, EXTENDED RELEASE ORAL 2 TIMES DAILY
Status: SHIPPED | COMMUNITY
Start: 2020-11-19 | End: 2022-04-01 | Stop reason: SDUPTHER

## 2020-11-19 RX ORDER — AZELASTINE 1 MG/ML
2 SPRAY, METERED NASAL 2 TIMES DAILY
Qty: 3 BOTTLE | Refills: 3 | Status: SHIPPED
Start: 2020-11-19 | End: 2022-04-01 | Stop reason: SDUPTHER

## 2020-11-19 ASSESSMENT — ENCOUNTER SYMPTOMS: SHORTNESS OF BREATH: 1

## 2020-11-19 ASSESSMENT — LIFESTYLE VARIABLES
AUDIT TOTAL SCORE: INCOMPLETE
HOW OFTEN DO YOU HAVE A DRINK CONTAINING ALCOHOL: NEVER
HOW OFTEN DO YOU HAVE A DRINK CONTAINING ALCOHOL: 0
AUDIT-C TOTAL SCORE: INCOMPLETE

## 2020-11-19 ASSESSMENT — PATIENT HEALTH QUESTIONNAIRE - PHQ9
2. FEELING DOWN, DEPRESSED OR HOPELESS: 1
1. LITTLE INTEREST OR PLEASURE IN DOING THINGS: 1
5. POOR APPETITE OR OVEREATING: 0
7. TROUBLE CONCENTRATING ON THINGS, SUCH AS READING THE NEWSPAPER OR WATCHING TELEVISION: 1
2. FEELING DOWN, DEPRESSED OR HOPELESS: 2
10. IF YOU CHECKED OFF ANY PROBLEMS, HOW DIFFICULT HAVE THESE PROBLEMS MADE IT FOR YOU TO DO YOUR WORK, TAKE CARE OF THINGS AT HOME, OR GET ALONG WITH OTHER PEOPLE: 2
8. MOVING OR SPEAKING SO SLOWLY THAT OTHER PEOPLE COULD HAVE NOTICED. OR THE OPPOSITE, BEING SO FIGETY OR RESTLESS THAT YOU HAVE BEEN MOVING AROUND A LOT MORE THAN USUAL: 2
SUM OF ALL RESPONSES TO PHQ QUESTIONS 1-9: 12
SUM OF ALL RESPONSES TO PHQ9 QUESTIONS 1 & 2: 3
4. FEELING TIRED OR HAVING LITTLE ENERGY: 3
9. THOUGHTS THAT YOU WOULD BE BETTER OFF DEAD, OR OF HURTING YOURSELF: 0
1. LITTLE INTEREST OR PLEASURE IN DOING THINGS: 1
6. FEELING BAD ABOUT YOURSELF - OR THAT YOU ARE A FAILURE OR HAVE LET YOURSELF OR YOUR FAMILY DOWN: 0
SUM OF ALL RESPONSES TO PHQ9 QUESTIONS 1 & 2: 2
SUM OF ALL RESPONSES TO PHQ QUESTIONS 1-9: 12
SUM OF ALL RESPONSES TO PHQ QUESTIONS 1-9: 2
SUM OF ALL RESPONSES TO PHQ QUESTIONS 1-9: 2
SUM OF ALL RESPONSES TO PHQ QUESTIONS 1-9: 12
SUM OF ALL RESPONSES TO PHQ QUESTIONS 1-9: 2
3. TROUBLE FALLING OR STAYING ASLEEP: 3

## 2020-11-19 ASSESSMENT — COLUMBIA-SUICIDE SEVERITY RATING SCALE - C-SSRS
2. HAVE YOU ACTUALLY HAD ANY THOUGHTS OF KILLING YOURSELF?: NO
1. WITHIN THE PAST MONTH, HAVE YOU WISHED YOU WERE DEAD OR WISHED YOU COULD GO TO SLEEP AND NOT WAKE UP?: NO
6. HAVE YOU EVER DONE ANYTHING, STARTED TO DO ANYTHING, OR PREPARED TO DO ANYTHING TO END YOUR LIFE?: NO

## 2020-11-19 NOTE — TELEPHONE ENCOUNTER
Patient Appointment Form:      PCP: Dr. Malia Santiago  Referring: Dr. Malia Santiago    Has the Patient:    Seen a Cardiologist? yes    date:2020  Physician:Dr. Wicho Gillis    Had a heart catheterization?  yes   date: Pt not sure  Hospital:  Pt not sure    Had heart surgery? no    Had a stress test or nuclear stress test? yes   date: 8/26/13   facility name:  Pt can't remember    Had an echocardiogram? yes   date: 9/22/18   facility [facility]:  [facility]    Had a vascular ultrasound? no    Had a 24/48 heart monitor or extended cardiac event monitor? no    Had recent blood work in the last 6 months? yes    date: 9/22/20    ordering physician: Bernard Ruiz    Had a pacemaker/ICD/ILR implant? no    Seen an Electrophysiologist? no        Will send records via: in [address] [place] [address]      Date & time of appointment:  11/23/20 @ 2:00

## 2020-11-19 NOTE — PROGRESS NOTES
spondylitis , RA    Chronic myocarditis ([facility] [address] 12/1/2016    Diverticulosis of sigmoid colon     Duodenal ulcer     Facet arthropathy     Fibrocystic breast changes     Fracture of metatarsal bone     RIGHT FOOT    Genital warts     GERD (gastroesophageal reflux disease)     Heart attack ([facility] [address] 09/15/2018    History of blood transfusion     loss of blood during pregnancy    History of cervical dysplasia 12/16/2013    Hyperlipidemia     Hypertension     IGT (impaired glucose tolerance) 11/11/2015    Myocarditis ([facility] [address]     Dr. Maria C Rascon    Obesity     OLIVIA (obstructive sleep apnea)     CPAP    Osteoarthritis of multiple joints 8/13/2013    Osteomyelitis ([facility] [address]     Primary insomnia 11/11/2015    Protruded cervical disc/DDD with neural foraminal stenosis 6/10/2015    Pulmonary nodule 9/27/2016    Sacroiliitis ([facility] [address] 6/2/2014    Shingles     Tobacco abuse     Valvular heart disease 11/30/2016    Per cardiac MRI 11/8/16-Mild TR/Mild AI Follows with [facility] Dr Maria C Rascon       Past Surgical History:   Procedure Laterality Date    BUNIONECTOMY  great toe    CARDIAC CATHETERIZATION  09/24/2018    COLONOSCOPY  8/20/12    Dr. Bradshaw Plane  11/2016    Dr Weeks [facility] great toe/skin    FOOT [address] [facility], TOTAL ABDOMINAL  2001    elective due to abnormal cells    JOINT REPLACEMENT Right     Knee    KNEE SURGERY Bilateral     arthroscopy    NERVE BLOCK  07/16/14    right sacroiliac joint #1    NERVE BLOCK Right 11/3/14    SI injection #2    NERVE BLOCK Right 11 19 14    si inj #3    NERVE BLOCK Bilateral 4/22/2015    greater occipital nerve block  #1    NERVE BLOCK Bilateral 5/6/15    greater occipital nerve block #2    NERVE BLOCK Bilateral 05/13/15    greater occipital nerve block #3    NERVE BLOCK Right 6/10/15    cervical transforaminal #1    NERVE BLOCK      Pain Management    NERVE BLOCK Right 1/18/2016    right hip injection  #1    NERVE BLOCK Right 1/25/16    hip injection #2    NERVE BLOCK  2/17/15    bilateral occipital     NERVE BLOCK Left 08/15/2016    genicular #1    NERVE BLOCK  09/07/2016    left genicular nerve block #2    NERVE BLOCK Right 12/07/2016    hip injection #2    NERVE BLOCK Right 04/19/2017    hip #1    NERVE BLOCK Right 09/11/2017    hip #2    OTHER SURGICAL HISTORY Right 2 9 15    lumbar radiofrequency    OTHER SURGICAL HISTORY Right 3/9/16    lumbar radiofrequency    OTHER SURGICAL HISTORY Right 11/14/2016    right hip injection #1    OTHER SURGICAL HISTORY Left 12/19/2016    genicular radiofrequency    ROTATOR CUFF REPAIR  1999 at 42 years    Dr Dianna Kim Left 7/22/14 at 48years old    Dr. Chidi Ortiz  8/20/12       Family History   Problem Relation Age of Onset    Arthritis Mother     High Blood Pressure Mother     Rheum Arthritis Mother     High Blood Pressure Father     Other Father         emphysema    Osteoarthritis Father     High Blood Pressure Brother     Depression Daughter     High Blood Pressure Brother     No Known Problems Son        CareTeam (Including outside providers/suppliers regularly involved in providing care):   Patient Care Team:  Malia Santiago MD as PCP - General (Family Medicine)  Malia Santiago MD as PCP - [facility] Provider  Aleida Villanueva MD as Consulting Physician (Cardiology)  Oz Dan DO as Obstetrician (Obstetrics & Gynecology)    Wt Readings from Last 3 Encounters:   11/19/20 167 lb (75.8 kg)   08/13/20 166 lb (75.3 kg)   12/17/19 174 lb (78.9 kg)     Vitals:    11/19/20 0914   BP: 104/66   Pulse: 50   Resp: 16   Temp: 97.1 °F (36.2 °C)   TempSrc: Temporal   SpO2: 98%   Weight: 167 lb (75.8 kg)   Height: 4' 9\" (1.448 m)     Body mass index is 36.14 kg/m².     Based upon direct observation of the patient, evaluation of cognition reveals global memory impairment noted due to stressors/ mood disorder      Patient's complete Health Risk Assessment and screening values have been reviewed and are found in Flowsheets. The following problems were reviewed today and where indicated follow up appointments were made and/or referrals ordered. Positive Risk Factor Screenings with Interventions:     Substance History:  Social History     Tobacco History     Smoking Status  Current Every Day Smoker Last attempt to quit  9/20/2018 Smoking Frequency  0.5 packs/day for 40 years (20 pk yrs) Smoking Tobacco Type  Cigarettes    Smokeless Tobacco Use  Never Used    Tobacco Comment  11/19/20 down to .5 PPD/ started at age 21-quit three times previously          Alcohol History     Alcohol Use Status  No          Drug Use     Drug Use Status  No          Sexual Activity     Sexually Active  Not Currently Partners  Male               Alcohol Screening:       A score of 8 or more is associated with harmful or hazardous drinking. A score of 13 or more in women, and 15 or more in men, is likely to indicate alcohol dependence. Substance Abuse Interventions:  · Tobacco abuse:  patient agrees to try the following tobacco cessation strategies:  gradual reduction of tobacco use: as discussed, work on mood symptoms as well    General Health and ACP:  General  In general, how would you say your health is?: Good  In the past 7 days, have you experienced any of the following?  New or Increased Pain, New or Increased Fatigue, Loneliness, Social Isolation, Stress or Anger?: (!) New or Increased Pain, Stress  Do you get the social and emotional support that you need?: (!) No  Do you have a Living Will?: Yes  Advance Directives     Power of  Living Will ACP-Advance Directive ACP-Power of     Not on File [employer] on 04/06/13 [place]      General Health Risk Interventions:  · referral to rheumatology, becca betancur    Health Habits/Nutrition:  Health Recommendations for Preventive Services Due: see orders and patient instructions/AVS.  . Recommended screening schedule for the next 5-10 years is provided to the patient in written form: see Patient Gricelda Marie was seen today for medicare awv, hypertension, discuss medications, nicotine dependence and depression. Diagnoses and all orders for this visit:    Routine physical  Preventive care reviewed    Need for influenza vaccination  -     INFLUENZA, QUADV, 3 YRS AND OLDER, IM PF, PREFILL SYR OR SDV, 0.5ML (AFLURIA QUADV, PF)    Encounter for screening mammogram for malignant neoplasm of breast  -     ALFONSO DIGITAL SCREEN BILATERAL PER PROTOCOL; Future    Personal history of tobacco use  -     WY VISIT TO DISCUSS LUNG CA SCREEN W LDCT  -     CT Lung Screen (Annual);  Future    Positive depression screening  Long discussion  She follows with psychiatrist  However she has not been seing a counselor  She had called [place] [facility] for appt  She willc all again  She agrees to contact our office if unable to get an appointment for another referral  -     Positive Screen for Clinical Depression with a Documented Follow-up Plan     Encounter for screening for lung cancer

## 2020-11-19 NOTE — PATIENT INSTRUCTIONS
What is lung cancer screening? Lung cancer screening is a way in which doctors check the lungs for early signs of cancer in people who have no symptoms of lung cancer. A low-dose CT scan uses much less radiation than a normal CT scan and shows a more detailed image of the lungs than a standard X-ray. The goal of lung cancer screening is to find cancer early, before it has a chance to grow, spread, or cause problems. One large study found that smokers who were screened with low-dose CT scans were less likely to die of lung cancer than those who were screened with standard X-ray. Below is a summary of the things you need to know regarding screening for lung cancer with low-dose computed tomography (LDCT). This is a screening program that involves routine annual screening with LDCT studies of the lung. The LDCTs are done using low-dose radiation that is not thought to increase your cancer risk. If you have other serious medical conditions (other cancers, congestive heart failure) that limit your life expectancy to less than 10 years, you should not undergo lung cancer screening with LDCT. The chance is 20%-60% that the LDCT result will show abnormalities. This would require additional testing which could include repeat imaging or even invasive procedures. Most (about 95%) of \"abnormal\" LDCT results are false in the sense that no lung cancer is ultimately found. Additionally, some (about 10%) of the cancers found would not affect your life expectancy, even if undetected and untreated. If you are still smoking, the single most important thing that you can do to reduce your risk of dying of lung cancer is to quit. For this screening to be covered by Medicare and most other insurers, strict criteria must be met. If you do not meet these criteria, but still wish to undergo LDCT testing, you will be required to sign a waiver indicating your willingness to pay for the scan. What is lung cancer screening? Lung cancer screening is a way in which doctors check the lungs for early signs of cancer in people who have no symptoms of lung cancer. A low-dose CT scan uses much less radiation than a normal CT scan and shows a more detailed image of the lungs than a standard X-ray. The goal of lung cancer screening is to find cancer early, before it has a chance to grow, spread, or cause problems. One large study found that smokers who were screened with low-dose CT scans were less likely to die of lung cancer than those who were screened with standard X-ray. Below is a summary of the things you need to know regarding screening for lung cancer with low-dose computed tomography (LDCT). This is a screening program that involves routine annual screening with LDCT studies of the lung. The LDCTs are done using low-dose radiation that is not thought to increase your cancer risk. If you have other serious medical conditions (other cancers, congestive heart failure) that limit your life expectancy to less than 10 years, you should not undergo lung cancer screening with LDCT. The chance is 20%-60% that the LDCT result will show abnormalities. This would require additional testing which could include repeat imaging or even invasive procedures. Most (about 95%) of \"abnormal\" LDCT results are false in the sense that no lung cancer is ultimately found. Additionally, some (about 10%) of the cancers found would not affect your life expectancy, even if undetected and untreated. If you are still smoking, the single most important thing that you can do to reduce your risk of dying of lung cancer is to quit. For this screening to be covered by Medicare and most other insurers, strict criteria must be met. If you do not meet these criteria, but still wish to undergo LDCT testing, you will be required to sign a waiver indicating your willingness to pay for the scan.   Personalized Preventive Plan for [facility] 11/19/2020  Medicare offers a range of preventive health benefits. Some of the tests and screenings are paid in full while other may be subject to a deductible, co-insurance, and/or copay. Some of these benefits include a comprehensive review of your medical history including lifestyle, illnesses that may run in your family, and various assessments and screenings as appropriate. After reviewing your medical record and screening and assessments performed today your provider may have ordered immunizations, labs, imaging, and/or referrals for you. A list of these orders (if applicable) as well as your Preventive Care list are included within your After Visit Summary for your review. Other Preventive Recommendations:    · A preventive eye exam performed by an eye specialist is recommended every 1-2 years to screen for glaucoma; cataracts, macular degeneration, and other eye disorders. · A preventive dental visit is recommended every 6 months. · Try to get at least 150 minutes of exercise per week or 10,000 steps per day on a pedometer . · Order or download the FREE \"Exercise & Physical Activity: Your Everyday Guide\" from [employer]. Call 8-289.774.8627 or search [employer] online. · You need 0158-2590 mg of calcium and 3411-5011 IU of vitamin D per day. It is possible to meet your calcium requirement with diet alone, but a vitamin D supplement is usually necessary to meet this goal.  · When exposed to the sun, use a sunscreen that protects against both UVA and UVB radiation with an SPF of 30 or greater. Reapply every 2 to 3 hours or after sweating, drying off with a towel, or swimming. · Always wear a seat belt when traveling in a car. Always wear a helmet when riding a bicycle or motorcycle.

## 2020-11-19 NOTE — PROGRESS NOTES
11/19/2020    Jesenia Ricketts is a 64 y.o. female here for   Chief Complaint   Patient presents with    Medicare AWV    Hypertension    Discuss Medications     Enbrel: last dose was a few months ago; follows [facility] Velas[facility] Nicotine Dependence     would like nicotene patch    Depression     positive score of 12     Regarding hypertension. Patient is not monitoring home blood pressures. Cardiovascular risk factors: advanced age (older than 54 for men, 72 for women), family history of premature cardiovascular disease, obesity (BMI >= 30 kg/m2), sedentary lifestyle and smoking/ tobacco exposure. Patient does smoke. Currently on amlodipine, chlorthalidone 25 mg, losartan 50 mg daily, losratan, metoprolol xl . Taking as prescribed. No adverse effects. Today,  BP: 104/66 and she is symptomatic with occasional chest pians. BP Readings from Last 3 Encounters:   11/19/20 104/66   08/13/20 120/81   05/12/20 115/84     Patient denies chest pain, diaphoresis, dyspnea, dyspnea on exertion, peripheral edema, palpitations, headache, vision changes. Trying to establish care at [address] counseling  Listless  Down  Stressors  Affecting sleep  Waking up 2 times a night  Also having pain   She has AS - ankylosing spondylitis  Can't go to see Jodie Cushing - having trouble with follow up appointments   It was helping her  She has since been taking mobic  However she is having chest pains  She is having sharp chest pains, they come and go. She does not always tell her family when she has them. She does not want to go to hospital. She las saw her cardiologist two weeks ago. She had a heart attack. [facility]   She would like a referral to see a cardiologist in [facility] because she worries that if she needed to go to the hospital suddenly she would like to be able ot get care at [facility] too and be able ot have someone who knew her history adequately.     Her chest pain is not associated with Exam  Constitutional:       Appearance: She is well-developed. HENT:      Head: Normocephalic and atraumatic. Cardiovascular:      Rate and Rhythm: Normal rate and regular rhythm. Heart sounds: No murmur. No friction rub. No gallop. Comments: Occasional irregular beat  Pulmonary:      Effort: Pulmonary effort is normal.      Breath sounds: Normal breath sounds. No wheezing or rales. Skin:     General: Skin is warm and dry. Neurological:      Mental Status: She is alert and oriented to person, place, and time. Assessment/Plan:  Tiffany was seen today for medicare awv, hypertension, discuss medications, nicotine dependence and depression. Diagnoses and all orders for this visit:    Ankylosing spondylitis, unspecified site of spine ([facility] [address]  Restart embrel  Needs to d/c mobic - due to cad and chest pain  Refer to rheumatology  -     External Referral To Rheumatology      refills  -     albuterol sulfate HFA (VENTOLIN HFA) 108 (90 Base) MCG/ACT inhaler; INHALE 2 PUFFS INTO THE LUNGS EVERY 6 HOURS AS NEEDED FOR WHEEZING OR SHORTNESS OF BREATH  -     azelastine (ASTELIN) 0.1 % nasal spray; 2 sprays by Nasal route 2 times daily Use in each nostril as directed    Personal history of tobacco use  -     ND VISIT TO DISCUSS LUNG CA SCREEN W LDCT  -     CT Lung Screen (Annual); Future    Positive depression screening  -     Positive Screen for Clinical Depression with a Documented Follow-up Plan     Encounter for screening for lung cancer    Gastroesophageal reflux disease, unspecified whether esophagitis present  -     omeprazole (PRILOSEC) 40 MG delayed release capsule;  Take 1 capsule by mouth Daily    CAD in native artery  Symptoms may be due to gastritis or to angina  Needs f/u with cardiology  Refer to Dr Julio Cesar Rodriguez records from [address] [place] [address] as well  -     Reza Benedict MD, Cardiology, [place]    Other orders  -     Cholecalciferol (VITAMIN D3) 50 MCG (2000 UT) CAPS; Take 1 capsule by mouth daily  -     dicyclomine (BENTYL) 10 MG capsule; Take 1 capsule by mouth 4 times daily Prior to meals  -     fluticasone (FLONASE) 50 MCG/ACT nasal spray; SHAKE LIQUID AND USE 2 SPRAYS IN EACH NOSTRIL DAILY  -     etanercept (ENBREL) 25 MG injection; Inject 25 mg into the skin once for 1 dose        F/u in 3 months    Advised patient to call with any new medication issues. All questions answered. Call or go to emergency department ifsymptoms worsen or persist.  Low Dose CT (LDCT) Lung Screening criteria met   Age 50-69   Pack year smoking >30   Still smoking or less than 15 year since quit   No sign or symptoms of lung cancer   > 11 months since last LDCT     Risks and benefits of lung cancer screening with LDCT scans discussed:    Significance of positive screen - False-positive LDCT results often occur. 95% of all positive results do not lead to a diagnosis of cancer. Usually further imaging can resolve most false-positive results; however, some patients may require invasive procedures. Over diagnosis risk - 10% to 12% of screen-detected lung cancer cases are over diagnosed--that is, the cancer would not have been detected in the patient's lifetime without the screening. Need for follow up screens annually to continue lung cancer screening effectiveness     Risks associated with radiation from annual LDCT- Radiation exposure is about the same as for a mammogram, which is about 1/3 of the annual background radiation exposure from everyday life. Starting screening at age 54 is not likely to increase cancer risk from radiation exposure. Patients with comorbidities resulting in life expectancy of < 10 years, or that would preclude treatment of an abnormality identified on CT, should not be screened due to lack of benefit.     To obtain maximal benefit from this screening, smoking cessation and long-term abstinence from smoking is critical

## 2020-11-30 ENCOUNTER — VIRTUAL VISIT (OUTPATIENT)
Dept: FAMILY MEDICINE CLINIC | Age: 60
End: 2020-11-30
Payer: MEDICARE

## 2020-11-30 PROCEDURE — 99213 OFFICE O/P EST LOW 20 MIN: CPT | Performed by: STUDENT IN AN ORGANIZED HEALTH CARE EDUCATION/TRAINING PROGRAM

## 2020-11-30 NOTE — PROGRESS NOTES
2020    TELEHEALTH EVALUATION -- Audio/Visual (During [facility]- public health emergency)    HPI:    Brett Quesada (:  1960) has requested an audio/video evaluation for the following concern(s):    Diarrhea  -new issue  -been going on for 2 months now  -has a bowel movement 6 times a day  -bowel movements are very loose and a large amount   -she has a bowel movement immediately after every meal and has to run to the bathroom to get there on time  -has soiled herself because of this  -stools are brown  -before this, was going once daily and the bowel movements were formed  -is taking immodium for this and is only helping a little bit  -denies any blood, denies any new medication or new diet  -drinks bottled water  -does have associated abdominal cramping  -of note, had c scope in  and was normal  -denies any fever, chills, nausea, or vomiting  -denies any weight loss  -does have associated fatigue    Review of Systems - as above     Prior to Visit Medications    Medication Sig Taking?  Authorizing Provider   albuterol sulfate HFA (VENTOLIN HFA) 108 (90 Base) MCG/ACT inhaler INHALE 2 PUFFS INTO THE LUNGS EVERY 6 HOURS AS NEEDED FOR WHEEZING OR SHORTNESS OF BREATH Yes Teofilo Aquino MD   azelastine (ASTELIN) 0.1 % nasal spray 2 sprays by Nasal route 2 times daily Use in each nostril as directed Yes Teofilo Aquino MD   Cholecalciferol (VITAMIN D3) 50 MCG (2000 UT) CAPS Take 1 capsule by mouth daily Yes Teofilo Aquino MD   dicyclomine (BENTYL) 10 MG capsule Take 1 capsule by mouth 4 times daily Prior to meals Yes Teofilo Aquino MD   fluticasone (FLONASE) 50 MCG/ACT nasal spray SHAKE LIQUID AND USE 2 SPRAYS IN EACH NOSTRIL DAILY Yes Teofilo Aquino MD   ranolazine (RANEXA) 1000 MG extended release tablet Take 1 tablet by mouth 2 times daily Yes Teofilo Aquino MD   meloxicam (MOBIC) 15 MG tablet Take 1 tablet by mouth daily Yes Teofilo Aquino MD   omeprazole (PRILOSEC) 40 MG delayed release capsule Take 1 capsule by mouth Daily Yes Curt Marte MD   amLODIPine (NORVASC) 5 MG tablet Take 1 tablet by mouth daily Yes Curt Marte MD   atorvastatin (LIPITOR) 80 MG tablet TAKE 1 TABLET BY MOUTH DAILY Yes Curt Marte MD   chlorthalidone (HYGROTON) 25 MG tablet Take 1 tablet by mouth daily Yes Curt Marte MD   clopidogrel (PLAVIX) 75 MG tablet Take 1 tablet by mouth daily Yes Curt Marte MD   losartan (COZAAR) 50 MG tablet Take 1 tablet by mouth daily Yes Curt Marte MD   metoprolol succinate (TOPROL XL) 100 MG extended release tablet TAKE 1 TABLET BY MOUTH EVERY DAY Yes Curt Marte MD   potassium chloride (KLOR-CON M) 20 MEQ TBCR extended release tablet Take 1 tablet by mouth every morning Yes Curt Marte MD   aspirin 81 MG chewable tablet Take 1 tablet by mouth daily Yes Vik Mancilla MD   zolpidem (AMBIEN) 5 MG tablet Take 5 mg by mouth nightly as needed. Yes Historical Provider, MD   mirtazapine (REMERON) 30 MG tablet Take 1 tablet by mouth nightly as needed (insomnia) Yes Curt Marte MD   furosemide (LASIX) 20 MG tablet Take 20 mg by mouth daily Yes Historical Provider, MD   DULoxetine (CYMBALTA) 30 MG extended release capsule Take 30 mg by mouth nightly Yes Historical Provider, MD   diclofenac sodium 1 % GEL as needed Yes Historical Provider, MD   NARCAN 4 MG/0.1ML LIQD nasal spray as needed Yes Historical Provider, MD   HYDROcodone-acetaminophen (NORCO)  MG per tablet 1 tablet 4 times daily. Indications: Pain management  Yes Historical Provider, MD   nitroGLYCERIN (NITROSTAT) 0.4 MG SL tablet up to max of 3 total doses. If no relief after 1 dose, call 911.  Yes Nita Barnes MD   Multiple Vitamins-Minerals (THERAPEUTIC MULTIVITAMIN-MINERALS) tablet Take 1 tablet by mouth daily Yes Historical Provider, MD   etanercept (ENBREL) 25 MG injection Inject 25 mg into the skin once for 1 dose  Curt Marte MD   nicotine (NICODERM CQ) 7 MG/24HR Place 1 patch onto the skin daily for 14 days  Karyle Minder, MD       Social History     Tobacco Use    Smoking status: Current Every Day Smoker     Packs/day: 0.50     Years: 40.00     Pack years: 20.00     Types: Cigarettes     Last attempt to quit: 2018     Years since quittin.1    Smokeless tobacco: Never Used    Tobacco comment: 20 down to .5 PPD/ started at age 21-quit three times previously   Substance Use Topics    Alcohol use: No     Alcohol/week: 0.0 standard drinks    Drug use: No       PHYSICAL EXAMINATION:  [ INSTRUCTIONS:  \"[x]\" Indicates a positive item  \"[]\" Indicates a negative item  -- DELETE ALL ITEMS NOT EXAMINED]    Constitutional: [x] Appears well-developed and well-nourished [] No apparent distress      [] Abnormal-   Mental status  [x] Alert and awake  [] Oriented to person/place/time []Able to follow commands      Eyes:  EOM    [x]  Normal  [] Abnormal-  Sclera  [x]  Normal  [] Abnormal -         Discharge []  None visible  [] Abnormal -    HENT:   [x] Normocephalic, atraumatic. [] Abnormal   [] Mouth/Throat: Mucous membranes are moist.     External Ears [x] Normal  [] Abnormal-     Neck: [x] No visualized mass     Pulmonary/Chest: [x] Respiratory effort normal.  [] No visualized signs of difficulty breathing or respiratory distress        [] Abnormal-      Musculoskeletal:   [] Normal gait with no signs of ataxia         [x] Normal range of motion of neck        [] Abnormal-       Neurological:        [x] No Facial Asymmetry (Cranial nerve 7 motor function) (limited exam to video visit)          [] No gaze palsy        [] Abnormal-         Skin:        [x] No significant exanthematous lesions or discoloration noted on facial skin         [] Abnormal-            Psychiatric:       [x] Normal Affect [] No Hallucinations        [] Abnormal-       ASSESSMENT/PLAN:  1.  Diarrhea, unspecified type  New issue  -With change in stool caliper and stooling pattern, will work up for infectious etiology  -If work up is negative, will refer to general surgery for further evaluation (may need repeat c scope? ??)  - FECAL LEUKOCYTES; Future  - OCCULT BLOOD DIAGNOSTIC; Future  - CLOSTRIDIUM DIFFICILE EIA; Future  - Culture, Stool; Future  - TSH; Future      No follow-ups on file. Mark Webb is a 64 y.o. female being evaluated by a Virtual Visit (video visit) encounter to address concerns as mentioned above. A caregiver was present when appropriate. Due to this being a TeleHealth encounter (During [facility] public health emergency), evaluation of the following organ systems was limited: Vitals/Constitutional/EENT/Resp/CV/GI//MS/Neuro/Skin/Heme-Lymph-Imm. Pursuant to the emergency declaration under the [address] [place] [address], [address] [place] [address] authority and the [employer] and [employer] Act, this Virtual Visit was conducted with patient's (and/or legal guardian's) consent, to reduce the patient's risk of exposure to COVID-19 and provide necessary medical care. The patient (and/or legal guardian) has also been advised to contact this office for worsening conditions or problems, and seek emergency medical treatment and/or call 911 if deemed necessary. Patient identification was verified at the start of the visit: Yes     Total time spent on this encounter: Not billed by time    Services were provided through a video synchronous discussion virtually to substitute for in-person clinic visit. Patient and provider were located at their individual homes. --Cassidy Huggins DO on 11/30/2020 at 4:48 PM    An electronic signature was used to authenticate this note.

## 2020-11-30 NOTE — PROGRESS NOTES
[facility] Precepting [facility]    Subjective: This Telehealth visit was performed as two-way, audio-video technology platform. Verbal consent was taken from patient as noted in resident's chart. Pt is complaining of loose stools for the last 2 mn. No changes to diet, drinks bottled water, no new meds. No fever. No constitutional symptoms. No weight loss. Eating makes her feels she has to go to the toiliet. No blood in stool. Pain is crampy in lower abd. This is not getting worse or getting better. Objective:  As per resident note    General appearance: As noted in resident's chart. Home health data if available    Assessment/Plan:  1. New onset loose stools - check stools for WBC, blood, c.diff, culture. If negative, to GS. Pt is HLA-B27 positive. Attending Physician Statement  I have reviewed the chart, including any radiology or labs. I have discussed the case, including pertinent history with the resident. I agree with the assessment, plan and orders as documented by the resident. Please refer to the resident note for additional information.     Electronically signed by Edmond Smith MD on 11/30/20 at 3:29 PM EST

## 2020-12-03 PROBLEM — Z98.890 S/P CARDIAC CATHETERIZATION: Status: ACTIVE | Noted: 2020-12-03

## 2020-12-04 ENCOUNTER — TELEPHONE (OUTPATIENT)
Dept: FAMILY MEDICINE CLINIC | Age: 60
End: 2020-12-04

## 2020-12-04 PROBLEM — M46.97 INFLAMMATORY SPONDYLOPATHY OF LUMBOSACRAL REGION (HCC): Status: ACTIVE | Noted: 2020-12-04

## 2020-12-04 NOTE — TELEPHONE ENCOUNTER
PA Case: [id number], Status: Approved, Coverage Starts on: 9/4/2020 12:00:00 AM, Coverage Ends on: 12/4/2021 12:00:00 AM    ENBREL

## 2021-01-05 RX ORDER — ETANERCEPT 25 MG/.5ML
SOLUTION SUBCUTANEOUS
Qty: 2 ML | Refills: 1 | Status: SHIPPED
Start: 2021-01-05 | End: 2021-04-06 | Stop reason: ALTCHOICE

## 2021-01-05 RX ORDER — ASPIRIN 81 MG/1
TABLET, CHEWABLE ORAL
Qty: 30 TABLET | Refills: 3 | Status: SHIPPED
Start: 2021-01-05 | End: 2021-06-02 | Stop reason: SDUPTHER

## 2021-01-11 DIAGNOSIS — R19.7 DIARRHEA, UNSPECIFIED TYPE: Primary | ICD-10-CM

## 2021-01-12 RX ORDER — DICYCLOMINE HYDROCHLORIDE 10 MG/1
CAPSULE ORAL
Qty: 120 CAPSULE | Refills: 2 | Status: SHIPPED
Start: 2021-01-12 | End: 2021-04-07

## 2021-01-20 DIAGNOSIS — I10 ESSENTIAL HYPERTENSION: ICD-10-CM

## 2021-01-20 DIAGNOSIS — I25.10 CAD IN NATIVE ARTERY: Chronic | ICD-10-CM

## 2021-01-20 DIAGNOSIS — K21.9 GASTROESOPHAGEAL REFLUX DISEASE, UNSPECIFIED WHETHER ESOPHAGITIS PRESENT: Chronic | ICD-10-CM

## 2021-01-21 RX ORDER — METOPROLOL SUCCINATE 100 MG/1
TABLET, EXTENDED RELEASE ORAL
Qty: 90 TABLET | Refills: 0 | Status: SHIPPED
Start: 2021-01-21 | End: 2021-06-03 | Stop reason: SDUPTHER

## 2021-01-21 RX ORDER — POTASSIUM CHLORIDE 1500 MG/1
20 TABLET, FILM COATED, EXTENDED RELEASE ORAL EVERY MORNING
Qty: 30 TABLET | Refills: 0 | Status: SHIPPED
Start: 2021-01-21 | End: 2021-02-17

## 2021-01-21 RX ORDER — CHLORTHALIDONE 25 MG/1
25 TABLET ORAL DAILY
Qty: 90 TABLET | Refills: 0 | Status: SHIPPED
Start: 2021-01-21 | End: 2021-06-03 | Stop reason: SDUPTHER

## 2021-01-21 RX ORDER — AMLODIPINE BESYLATE 5 MG/1
5 TABLET ORAL DAILY
Qty: 90 TABLET | Refills: 0 | Status: SHIPPED
Start: 2021-01-21 | End: 2021-06-03 | Stop reason: SDUPTHER

## 2021-01-21 RX ORDER — LOSARTAN POTASSIUM 50 MG/1
50 TABLET ORAL DAILY
Qty: 90 TABLET | Refills: 0 | Status: SHIPPED
Start: 2021-01-21 | End: 2021-06-03 | Stop reason: SDUPTHER

## 2021-01-21 RX ORDER — CLOPIDOGREL BISULFATE 75 MG/1
75 TABLET ORAL DAILY
Qty: 90 TABLET | Refills: 0 | Status: SHIPPED
Start: 2021-01-21 | End: 2021-06-03 | Stop reason: SDUPTHER

## 2021-01-21 RX ORDER — ATORVASTATIN CALCIUM 80 MG/1
TABLET, FILM COATED ORAL
Qty: 90 TABLET | Refills: 0 | Status: SHIPPED
Start: 2021-01-21 | End: 2021-06-03 | Stop reason: SDUPTHER

## 2021-01-21 RX ORDER — OMEPRAZOLE 40 MG/1
40 CAPSULE, DELAYED RELEASE ORAL DAILY
Qty: 90 CAPSULE | Refills: 0 | Status: SHIPPED
Start: 2021-01-21 | End: 2021-06-03 | Stop reason: SDUPTHER

## 2021-02-10 DIAGNOSIS — K21.9 GASTROESOPHAGEAL REFLUX DISEASE, UNSPECIFIED WHETHER ESOPHAGITIS PRESENT: Chronic | ICD-10-CM

## 2021-02-10 RX ORDER — OMEPRAZOLE 20 MG/1
20 CAPSULE, DELAYED RELEASE ORAL DAILY
Qty: 90 CAPSULE | Refills: 0 | OUTPATIENT
Start: 2021-02-10 | End: 2021-05-11

## 2021-02-17 DIAGNOSIS — I10 ESSENTIAL HYPERTENSION: ICD-10-CM

## 2021-02-17 RX ORDER — POTASSIUM CHLORIDE 1500 MG/1
20 TABLET, FILM COATED, EXTENDED RELEASE ORAL EVERY MORNING
Qty: 30 TABLET | Refills: 0 | Status: SHIPPED
Start: 2021-02-17 | End: 2021-02-23 | Stop reason: SDUPTHER

## 2021-02-17 NOTE — TELEPHONE ENCOUNTER
Pt is overdue for labs, including a potassium  Please have her schedule  There are orders in the system   I can authorize enough potassium until she has her labs done.

## 2021-02-17 NOTE — TELEPHONE ENCOUNTER
I called patient, scheduled her with Chuck Laguerre for 2/23, she is having breast pain. She will get labs that day while here. She will be out of potassium Friday.

## 2021-02-23 ENCOUNTER — OFFICE VISIT (OUTPATIENT)
Dept: FAMILY MEDICINE CLINIC | Age: 61
End: 2021-02-23
Payer: MEDICARE

## 2021-02-23 VITALS
SYSTOLIC BLOOD PRESSURE: 105 MMHG | HEART RATE: 68 BPM | RESPIRATION RATE: 18 BRPM | HEIGHT: 57 IN | OXYGEN SATURATION: 97 % | WEIGHT: 171 LBS | TEMPERATURE: 96.7 F | BODY MASS INDEX: 36.89 KG/M2 | DIASTOLIC BLOOD PRESSURE: 61 MMHG

## 2021-02-23 DIAGNOSIS — I10 ESSENTIAL HYPERTENSION: ICD-10-CM

## 2021-02-23 DIAGNOSIS — N63.24 UNSPECIFIED LUMP IN THE LEFT BREAST, LOWER INNER QUADRANT: Primary | ICD-10-CM

## 2021-02-23 LAB
ANION GAP SERPL CALCULATED.3IONS-SCNC: 8 MMOL/L (ref 7–16)
BUN BLDV-MCNC: 22 MG/DL (ref 6–20)
CALCIUM SERPL-MCNC: 9.2 MG/DL (ref 8.6–10.2)
CHLORIDE BLD-SCNC: 99 MMOL/L (ref 98–107)
CO2: 30 MMOL/L (ref 22–29)
CREAT SERPL-MCNC: 1.1 MG/DL (ref 0.5–1)
GFR AFRICAN AMERICAN: >60
GFR NON-AFRICAN AMERICAN: 51 ML/MIN/1.73
GLUCOSE BLD-MCNC: 86 MG/DL (ref 74–99)
POTASSIUM SERPL-SCNC: 4.8 MMOL/L (ref 3.5–5)
SODIUM BLD-SCNC: 137 MMOL/L (ref 132–146)

## 2021-02-23 PROCEDURE — 99213 OFFICE O/P EST LOW 20 MIN: CPT | Performed by: FAMILY MEDICINE

## 2021-02-23 PROCEDURE — 36415 COLL VENOUS BLD VENIPUNCTURE: CPT | Performed by: FAMILY MEDICINE

## 2021-02-23 RX ORDER — POTASSIUM CHLORIDE 1500 MG/1
20 TABLET, FILM COATED, EXTENDED RELEASE ORAL EVERY MORNING
Qty: 30 TABLET | Refills: 2 | Status: SHIPPED
Start: 2021-02-23 | End: 2021-02-25

## 2021-02-23 NOTE — PROGRESS NOTES
S: 64 y.o. female with   Chief Complaint   Patient presents with    Breast Pain     L breast, bruised now lump    Mass     Upper L thigh       Left breast lumps, started as large bruise, nontender, feels like going deeper, no nipple discharge, no skin changes, no axillary pain or swelling  No known trauma  Mammogram 1 year ago, due for repeat  No h/o breast CA, no FH breast CA  Plavix and ASA  Left thigh, started as pustule, scratched, surface now rough    O: VS:  height is 4' 9\" (1.448 m) and weight is 171 lb (77.6 kg). Her temporal temperature is 96.7 °F (35.9 °C). Her blood pressure is 105/61 and her pulse is 68. Her respiration is 18 and oxygen saturation is 97%. BP Readings from Last 3 Encounters:   02/23/21 105/61   11/19/20 104/66   08/13/20 120/81     See resident note  Left breast, 5 o'clock, mobile 1cm nodule, nontender, no skin changes, no axillary LAD    Impression/Plan:   1) Left breast nodule: Diagnostic mammogram with left breast US        Health Maintenance Due   Topic Date Due    Shingles Vaccine (1 of 2) 08/14/2012    Breast cancer screen  12/27/2020         Attending Physician Statement  I have discussed the case, including pertinent history and exam findings with the resident. I agree with the documented assessment and plan.       Mily Chavez MD

## 2021-02-23 NOTE — PROGRESS NOTES
2/25/2021    sEther Rock is a 64 y.o. femalehere for:    HPI:  CC- breast lump  Left breast lump  First noticed a large bruise over the breast around 3 weeks ago  No known trauma  Patient gets easily bruised. Has other body parts with bruises. This has happened since being in plavix and aspirin for her recent MI. Then, around 1 week ago, noticed a small lump near the nipple  Does not think it has changed in size, but believes it feels deeper now, and it was more superficial initially  Not painful  No changes over the nipple such as nipple inversion, discharge or other skin changes  No axillary pain or lumps  Last mammogram 1 year ago- no suspicious findings  No personal or family hx of breast cancer. No hx of chemotherapy or radiation. Has also a skin \"lesion: over left thigh  Started as pustule, scratching it over and over, and now has a rough surface  Not tender, no bleeding, no ulceration.       BP Readings from Last 3 Encounters:   02/23/21 105/61   11/19/20 104/66   08/13/20 120/81     Current Outpatient Medications   Medication Sig Dispense Refill    potassium chloride (KLOR-CON M) 20 MEQ TBCR extended release tablet Take 1 tablet by mouth every morning 30 tablet 2    amLODIPine (NORVASC) 5 MG tablet Take 1 tablet by mouth daily 90 tablet 0    atorvastatin (LIPITOR) 80 MG tablet TAKE 1 TABLET BY MOUTH DAILY 90 tablet 0    chlorthalidone (HYGROTON) 25 MG tablet Take 1 tablet by mouth daily 90 tablet 0    clopidogrel (PLAVIX) 75 MG tablet Take 1 tablet by mouth daily 90 tablet 0    losartan (COZAAR) 50 MG tablet Take 1 tablet by mouth daily 90 tablet 0    metoprolol succinate (TOPROL XL) 100 MG extended release tablet TAKE 1 TABLET BY MOUTH EVERY DAY 90 tablet 0    omeprazole (PRILOSEC) 40 MG delayed release capsule Take 1 capsule by mouth Daily 90 capsule 0    dicyclomine (BENTYL) 10 MG capsule TAKE 1 CAPSULE BY MOUTH FOUR TIMES A DAY BEFORE MEALS 120 capsule 2  aspirin 81 MG chewable tablet CHEW AND SWALLOW 1 TABLET BY MOUTH DAILY 30 tablet 3    ENBREL 25 MG/0.5ML SOSY INJECT 0.5 ML INTO SKIN ONCE A WEEK 2 mL 1    albuterol sulfate HFA (VENTOLIN HFA) 108 (90 Base) MCG/ACT inhaler INHALE 2 PUFFS INTO THE LUNGS EVERY 6 HOURS AS NEEDED FOR WHEEZING OR SHORTNESS OF BREATH 3 Inhaler 3    azelastine (ASTELIN) 0.1 % nasal spray 2 sprays by Nasal route 2 times daily Use in each nostril as directed 3 Bottle 3    Cholecalciferol (VITAMIN D3) 50 MCG (2000 UT) CAPS Take 1 capsule by mouth daily 90 capsule 3    fluticasone (FLONASE) 50 MCG/ACT nasal spray SHAKE LIQUID AND USE 2 SPRAYS IN EACH NOSTRIL DAILY 3 Bottle 3    ranolazine (RANEXA) 1000 MG extended release tablet Take 1 tablet by mouth 2 times daily      zolpidem (AMBIEN) 5 MG tablet Take 5 mg by mouth nightly as needed.  mirtazapine (REMERON) 30 MG tablet Take 1 tablet by mouth nightly as needed (insomnia)      furosemide (LASIX) 20 MG tablet Take 20 mg by mouth daily  3    DULoxetine (CYMBALTA) 30 MG extended release capsule Take 30 mg by mouth nightly  2    diclofenac sodium 1 % GEL as needed  0    NARCAN 4 MG/0.1ML LIQD nasal spray as needed  0    HYDROcodone-acetaminophen (NORCO)  MG per tablet 1 tablet 4 times daily. Indications: Pain management       nitroGLYCERIN (NITROSTAT) 0.4 MG SL tablet up to max of 3 total doses. If no relief after 1 dose, call 911. 25 tablet 3    Multiple Vitamins-Minerals (THERAPEUTIC MULTIVITAMIN-MINERALS) tablet Take 1 tablet by mouth daily      meloxicam (MOBIC) 15 MG tablet Take 1 tablet by mouth daily      nicotine (NICODERM CQ) 7 MG/24HR Place 1 patch onto the skin daily for 14 days 30 patch 0     No current facility-administered medications for this visit.        No Known Allergies    Past Medical & Surgical History:      Diagnosis Date    Anxiety and depression 9/11/2013    Baker's cyst, ruptured     CAD (coronary artery disease)     Chronic back pain ankylosing spondylitis , RA    Chronic myocarditis ([facility] [address] 12/1/2016    Diverticulosis of sigmoid colon     Duodenal ulcer     Facet arthropathy     Fibrocystic breast changes     Fracture of metatarsal bone     RIGHT FOOT    Genital warts     GERD (gastroesophageal reflux disease)     Heart attack ([address] 09/15/2018    History of blood transfusion     loss of blood during pregnancy    History of cervical dysplasia 12/16/2013    Hyperlipidemia     Hypertension     IGT (impaired glucose tolerance) 11/11/2015    Myocarditis ([facility] [address]     Dr. Leigh Campbell    Obesity     OLIVIA (obstructive sleep apnea)     CPAP    Osteoarthritis of multiple joints 8/13/2013    Osteomyelitis ([facility] [address]     Primary insomnia 11/11/2015    Protruded cervical disc/DDD with neural foraminal stenosis 6/10/2015    Pulmonary nodule 9/27/2016    Sacroiliitis ([facility] [address] 6/2/2014    Shingles     Tobacco abuse     Valvular heart disease 11/30/2016    Per cardiac MRI 11/8/16-Mild TR/Mild AI Follows with [facility] Dr Leigh Campbell     Past Surgical History:   Procedure Laterality Date    BUNIONECTOMY  great toe    CARDIAC CATHETERIZATION  09/24/2018    COLONOSCOPY  8/20/12    Dr. Erica Franco  11/2016    Dr Suma Drake great toe/skin    FOOT [id number], TOTAL ABDOMINAL  2001    elective due to abnormal cells    JOINT REPLACEMENT Right     Knee    KNEE SURGERY Bilateral     arthroscopy    NERVE BLOCK  07/16/14    right sacroiliac joint #1    NERVE BLOCK Right 11/3/14    SI injection #2    NERVE BLOCK Right 11 19 14    si inj #3    NERVE BLOCK Bilateral 4/22/2015    greater occipital nerve block  #1    NERVE BLOCK Bilateral 5/6/15    greater occipital nerve block #2    NERVE BLOCK Bilateral 05/13/15    greater occipital nerve block #3    NERVE BLOCK Right 6/10/15    cervical transforaminal #1    NERVE BLOCK      Pain Management    NERVE BLOCK Right 1/18/2016    right hip injection  #1  NERVE BLOCK Right 16    hip injection #2    NERVE BLOCK  2/17/15    bilateral occipital     NERVE BLOCK Left 08/15/2016    genicular #1    NERVE BLOCK  2016    left genicular nerve block #2    NERVE BLOCK Right 2016    hip injection #2    NERVE BLOCK Right 2017    hip #1    NERVE BLOCK Right 2017    hip #2    OTHER SURGICAL HISTORY Right  15    lumbar radiofrequency    OTHER SURGICAL HISTORY Right 3/9/16    lumbar radiofrequency    OTHER SURGICAL HISTORY Right 2016    right hip injection #1    OTHER SURGICAL HISTORY Left 2016    genicular radiofrequency    ROTATOR CUFF REPAIR   at 42 years    Dr Shante Flor Left 14 at 48years old    Dr. Whiteside Shape  12       Family History:      Problem Relation Age of Onset    Arthritis Mother     High Blood Pressure Mother     Rheum Arthritis Mother     High Blood Pressure Father     Other Father         emphysema    Osteoarthritis Father     High Blood Pressure Brother     Depression Daughter     High Blood Pressure Brother     No Known Problems Son        Social History:  Social History     Tobacco Use    Smoking status: Current Every Day Smoker     Packs/day: 0.50     Years: 40.00     Pack years: 20.00     Types: Cigarettes     Last attempt to quit: 2018     Years since quittin.4    Smokeless tobacco: Never Used    Tobacco comment: 20 down to .5 PPD/ started at age 21-quit three times previously   Substance Use Topics    Alcohol use: No     Alcohol/week: 0.0 standard drinks       Immunization History   Administered Date(s) Administered    Influenza 10/28/2013    Influenza Vaccine, unspecified formulation 10/28/2013, 10/03/2017    Influenza Virus Vaccine 2015, 2016    Influenza, High Dose (Fluzone 65 yrs and older) 10/18/2017  Influenza, Quadv, IM, (6 mo and older Fluzone, Flulaval, Fluarix and 3 yrs and older Afluria) 11/04/2016    Influenza, Quadv, IM, PF (6 mo and older Fluzone, Flulaval, Fluarix, and 3 yrs and older Afluria) 10/03/2017, 12/17/2019, 11/19/2020    Pneumococcal Conjugate 13-valent ([id number]) 01/26/2017    Pneumococcal Polysaccharide ([id number]) 07/02/2019    Tdap (Boostrix, Adacel) 07/20/2017       Review of Systems   Constitutional: Negative for chills, fever and unexpected weight change. HENT: Negative for congestion. Respiratory: Negative for cough, shortness of breath and wheezing. Cardiovascular: Negative for chest pain, palpitations and leg swelling. Gastrointestinal: Negative for abdominal pain, blood in stool and vomiting. Genitourinary: Negative for dysuria and hematuria. Musculoskeletal: Negative for neck pain and neck stiffness. Skin:        Left breast lump    Neurological: Negative for numbness and headaches. Hematological: Bruises/bleeds easily. Psychiatric/Behavioral: Negative for agitation and confusion. VS:  /61   Pulse 68   Temp 96.7 °F (35.9 °C) (Temporal)   Resp 18   Ht 4' 9\" (1.448 m)   Wt 171 lb (77.6 kg)   SpO2 97%   BMI 37.00 kg/m²     Physical Exam  Vitals signs reviewed. Constitutional:       General: She is not in acute distress. HENT:      Head: Normocephalic and atraumatic. Nose: No congestion. Eyes:      General: No scleral icterus. Conjunctiva/sclera: Conjunctivae normal.      Pupils: Pupils are equal, round, and reactive to light. Neck:      Musculoskeletal: Normal range of motion and neck supple. Cardiovascular:      Rate and Rhythm: Normal rate and regular rhythm. Heart sounds: Normal heart sounds. Pulmonary:      Effort: Pulmonary effort is normal. No respiratory distress. Breath sounds: Normal breath sounds. No wheezing. Abdominal:      General: There is no distension. Palpations: Abdomen is soft.

## 2021-02-25 ASSESSMENT — ENCOUNTER SYMPTOMS
BLOOD IN STOOL: 0
VOMITING: 0
WHEEZING: 0
ROS SKIN COMMENTS: LEFT BREAST LUMP
ABDOMINAL PAIN: 0
SHORTNESS OF BREATH: 0
COUGH: 0

## 2021-03-04 DIAGNOSIS — N63.24 UNSPECIFIED LUMP IN THE LEFT BREAST, LOWER INNER QUADRANT: Primary | ICD-10-CM

## 2021-03-08 RX ORDER — ETANERCEPT 25 MG/.5ML
SOLUTION SUBCUTANEOUS
Qty: 2 ML | Refills: 1 | OUTPATIENT
Start: 2021-03-08

## 2021-03-09 DIAGNOSIS — R89.9 ELEVATED LABORATORY TEST RESULT: Primary | ICD-10-CM

## 2021-03-10 ENCOUNTER — NURSE ONLY (OUTPATIENT)
Dept: FAMILY MEDICINE CLINIC | Age: 61
End: 2021-03-10
Payer: MEDICARE

## 2021-03-10 DIAGNOSIS — F32.A ANXIETY AND DEPRESSION: Chronic | ICD-10-CM

## 2021-03-10 DIAGNOSIS — I10 ESSENTIAL HYPERTENSION: ICD-10-CM

## 2021-03-10 DIAGNOSIS — F41.9 ANXIETY AND DEPRESSION: Chronic | ICD-10-CM

## 2021-03-10 DIAGNOSIS — K21.9 GASTROESOPHAGEAL REFLUX DISEASE, UNSPECIFIED WHETHER ESOPHAGITIS PRESENT: Chronic | ICD-10-CM

## 2021-03-10 DIAGNOSIS — R89.9 ELEVATED LABORATORY TEST RESULT: ICD-10-CM

## 2021-03-10 PROCEDURE — 36415 COLL VENOUS BLD VENIPUNCTURE: CPT | Performed by: FAMILY MEDICINE

## 2021-03-11 ENCOUNTER — HOSPITAL ENCOUNTER (OUTPATIENT)
Dept: GENERAL RADIOLOGY | Age: 61
Discharge: HOME | End: 2021-03-13
Payer: MEDICARE

## 2021-03-11 DIAGNOSIS — N63.24 UNSPECIFIED LUMP IN THE LEFT BREAST, LOWER INNER QUADRANT: ICD-10-CM

## 2021-03-11 LAB
ANION GAP SERPL CALCULATED.3IONS-SCNC: 9 MMOL/L (ref 7–16)
BUN BLDV-MCNC: 19 MG/DL (ref 6–20)
CALCIUM SERPL-MCNC: 9 MG/DL (ref 8.6–10.2)
CHLORIDE BLD-SCNC: 103 MMOL/L (ref 98–107)
CO2: 28 MMOL/L (ref 22–29)
CREAT SERPL-MCNC: 0.9 MG/DL (ref 0.5–1)
GFR AFRICAN AMERICAN: >60
GFR NON-AFRICAN AMERICAN: >60 ML/MIN/1.73
GLUCOSE BLD-MCNC: 85 MG/DL (ref 74–99)
POTASSIUM SERPL-SCNC: 4 MMOL/L (ref 3.5–5)
SODIUM BLD-SCNC: 140 MMOL/L (ref 132–146)

## 2021-03-11 PROCEDURE — 76642 ULTRASOUND BREAST LIMITED: CPT

## 2021-03-11 PROCEDURE — 77066 DX MAMMO INCL CAD BI: CPT

## 2021-03-16 ENCOUNTER — APPOINTMENT (OUTPATIENT)
Dept: GENERAL RADIOLOGY | Age: 61
End: 2021-03-16
Payer: MEDICARE

## 2021-03-16 ENCOUNTER — HOSPITAL ENCOUNTER (EMERGENCY)
Age: 61
Discharge: HOME | End: 2021-03-17
Attending: EMERGENCY MEDICINE
Payer: MEDICARE

## 2021-03-16 ENCOUNTER — APPOINTMENT (OUTPATIENT)
Dept: ULTRASOUND IMAGING | Age: 61
End: 2021-03-16
Payer: MEDICARE

## 2021-03-16 ENCOUNTER — APPOINTMENT (OUTPATIENT)
Dept: CT IMAGING | Age: 61
End: 2021-03-16
Payer: MEDICARE

## 2021-03-16 VITALS
HEART RATE: 62 BPM | TEMPERATURE: 97 F | SYSTOLIC BLOOD PRESSURE: 136 MMHG | OXYGEN SATURATION: 95 % | DIASTOLIC BLOOD PRESSURE: 104 MMHG

## 2021-03-16 DIAGNOSIS — I50.9 ACUTE ON CHRONIC CONGESTIVE HEART FAILURE, UNSPECIFIED HEART FAILURE TYPE (HCC): Primary | ICD-10-CM

## 2021-03-16 LAB
ALBUMIN SERPL-MCNC: 3.8 G/DL (ref 3.5–5.2)
ALP BLD-CCNC: 100 U/L (ref 35–104)
ALT SERPL-CCNC: 50 U/L (ref 0–32)
ANION GAP SERPL CALCULATED.3IONS-SCNC: 8 MMOL/L (ref 7–16)
AST SERPL-CCNC: 27 U/L (ref 0–31)
BASOPHILS ABSOLUTE: 0.09 E9/L (ref 0–0.2)
BASOPHILS RELATIVE PERCENT: 0.8 % (ref 0–2)
BILIRUB SERPL-MCNC: <0.2 MG/DL (ref 0–1.2)
BUN BLDV-MCNC: 13 MG/DL (ref 6–20)
CALCIUM SERPL-MCNC: 8.2 MG/DL (ref 8.6–10.2)
CHLORIDE BLD-SCNC: 105 MMOL/L (ref 98–107)
CO2: 27 MMOL/L (ref 22–29)
CREAT SERPL-MCNC: 0.9 MG/DL (ref 0.5–1)
EOSINOPHILS ABSOLUTE: 0.2 E9/L (ref 0.05–0.5)
EOSINOPHILS RELATIVE PERCENT: 1.7 % (ref 0–6)
GFR AFRICAN AMERICAN: >60
GFR NON-AFRICAN AMERICAN: >60 ML/MIN/1.73
GLUCOSE BLD-MCNC: 94 MG/DL (ref 74–99)
HCT VFR BLD CALC: 41.5 % (ref 34–48)
HEMOGLOBIN: 13.3 G/DL (ref 11.5–15.5)
IMMATURE GRANULOCYTES #: 0.06 E9/L
IMMATURE GRANULOCYTES %: 0.5 % (ref 0–5)
LYMPHOCYTES ABSOLUTE: 3.35 E9/L (ref 1.5–4)
LYMPHOCYTES RELATIVE PERCENT: 28.8 % (ref 20–42)
MCH RBC QN AUTO: 30.4 PG (ref 26–35)
MCHC RBC AUTO-ENTMCNC: 32 % (ref 32–34.5)
MCV RBC AUTO: 94.7 FL (ref 80–99.9)
MONOCYTES ABSOLUTE: 1.11 E9/L (ref 0.1–0.95)
MONOCYTES RELATIVE PERCENT: 9.5 % (ref 2–12)
NEUTROPHILS ABSOLUTE: 6.84 E9/L (ref 1.8–7.3)
NEUTROPHILS RELATIVE PERCENT: 58.7 % (ref 43–80)
PDW BLD-RTO: 14.6 FL (ref 11.5–15)
PLATELET # BLD: 299 E9/L (ref 130–450)
PMV BLD AUTO: 11 FL (ref 7–12)
POTASSIUM SERPL-SCNC: 3.8 MMOL/L (ref 3.5–5)
PRO-BNP: 794 PG/ML (ref 0–125)
RBC # BLD: 4.38 E12/L (ref 3.5–5.5)
SODIUM BLD-SCNC: 140 MMOL/L (ref 132–146)
TOTAL PROTEIN: 6.5 G/DL (ref 6.4–8.3)
TROPONIN: <0.01 NG/ML (ref 0–0.03)
WBC # BLD: 11.7 E9/L (ref 4.5–11.5)

## 2021-03-16 PROCEDURE — 83880 ASSAY OF NATRIURETIC PEPTIDE: CPT

## 2021-03-16 PROCEDURE — 2580000003 HC RX 258: Performed by: RADIOLOGY

## 2021-03-16 PROCEDURE — 71275 CT ANGIOGRAPHY CHEST: CPT

## 2021-03-16 PROCEDURE — 93970 EXTREMITY STUDY: CPT

## 2021-03-16 PROCEDURE — 71046 X-RAY EXAM CHEST 2 VIEWS: CPT

## 2021-03-16 PROCEDURE — 93970 EXTREMITY STUDY: CPT | Performed by: RADIOLOGY

## 2021-03-16 PROCEDURE — 84484 ASSAY OF TROPONIN QUANT: CPT

## 2021-03-16 PROCEDURE — 99283 EMERGENCY DEPT VISIT LOW MDM: CPT

## 2021-03-16 PROCEDURE — 85025 COMPLETE CBC W/AUTO DIFF WBC: CPT

## 2021-03-16 PROCEDURE — 80053 COMPREHEN METABOLIC PANEL: CPT

## 2021-03-16 PROCEDURE — 6360000004 HC RX CONTRAST MEDICATION: Performed by: RADIOLOGY

## 2021-03-16 PROCEDURE — 93005 ELECTROCARDIOGRAM TRACING: CPT | Performed by: PHYSICIAN ASSISTANT

## 2021-03-16 RX ORDER — SODIUM CHLORIDE 0.9 % (FLUSH) 0.9 %
10 SYRINGE (ML) INJECTION PRN
Status: COMPLETED | OUTPATIENT
Start: 2021-03-16 | End: 2021-03-16

## 2021-03-16 RX ADMIN — Medication 10 ML: at 23:29

## 2021-03-16 RX ADMIN — IOPAMIDOL 70 ML: 755 INJECTION, SOLUTION INTRAVENOUS at 23:29

## 2021-03-16 NOTE — ED NOTES
FIRST PROVIDER CONTACT ASSESSMENT NOTE      Department of Emergency Medicine   ED  First Provider Note   3/16/21  7:24 PM EDT    Chief Complaint: Shortness of Breath (pt reports feeling short of breath with bilateral leg swelling)      History of Present Illness:    Adryan Perkins is a 64 y.o. female who presents to the ED by private car for Sob leg swelling   Focused Screening Exam:  Constitutional:  Alert, appears stated age and is in no distress. Heart regular rate and rhythm  Lungs clear    *ALLERGIES*     Patient has no known allergies.      ED Triage Vitals [03/16/21 1915]   BP Temp Temp Source Pulse Resp SpO2 Height Weight   -- 97 °F (36.1 °C) Temporal 62 -- 95 % -- --        Initial Plan of Care:  Initiate Treatment-Testing, Proceed toTreatment Area When Bed Available for ED Attending/MLP to Continue Care    -----------------[address] ASSESSMENT NOTE--------------  Electronically signed by JAGJIT Lehman   DD: 3/16/21     JAGJIT Lehman  03/16/21 1927

## 2021-03-17 LAB
EKG ATRIAL RATE: 55 BPM
EKG P AXIS: 67 DEGREES
EKG P-R INTERVAL: 118 MS
EKG Q-T INTERVAL: 456 MS
EKG QRS DURATION: 96 MS
EKG QTC CALCULATION (BAZETT): 436 MS
EKG R AXIS: 86 DEGREES
EKG T AXIS: 26 DEGREES
EKG VENTRICULAR RATE: 55 BPM

## 2021-03-17 PROCEDURE — 93010 ELECTROCARDIOGRAM REPORT: CPT | Performed by: INTERNAL MEDICINE

## 2021-03-17 NOTE — ED PROVIDER NOTES
Department of Emergency Medicine   ED  Provider Note  Admit Date/RoomTime: 3/16/2021 10:19 PM  ED Room: [facility]          History of Present Illness:  3/16/21, Time: 10:23 PM EDT  Chief Complaint   Patient presents with    Shortness of Breath     pt reports feeling short of breath with bilateral leg swelling                Tom Moise is a 64 y.o. female presenting to the ED for leg swelling and dyspnea on exertion, beginning 3 days ago. The complaint has been intermittent, mild in severity, and worsened by light exertion. Patient states that she has noted some swelling to her lower legs over the past few days. She has been feeling short of breath with exertion as well. She denies any fevers or cough. She denies any chest pain or discomfort. She states that she does not know that she has any history of CHF, states that she has had a heart attack in the past.  She denies any abdominal pain, no nausea or vomiting. No recent travel or prolonged immobilization.     Review of Systems:   Pertinent positives and negatives are stated within HPI, all other systems reviewed and are negative.        --------------------------------------------- PAST HISTORY ---------------------------------------------  Past Medical History:  has a past medical history of Anxiety and depression, Baker's cyst, ruptured, CAD (coronary artery disease), Chronic back pain, Chronic myocarditis ([address] Diverticulosis of sigmoid colon, Duodenal ulcer, Facet arthropathy, Fibrocystic breast changes, Fracture of metatarsal bone, Genital warts, GERD (gastroesophageal reflux disease), Heart attack ([address] History of blood transfusion, History of cervical dysplasia, Hyperlipidemia, Hypertension, IGT (impaired glucose tolerance), Myocarditis ([address] Obesity, OLIVIA (obstructive sleep apnea), Osteoarthritis of multiple joints, Osteomyelitis ([address] Primary insomnia, Protruded cervical disc/DDD with neural foraminal stenosis, Pulmonary nodule, Sacroiliitis ([facility] [address] Shingles, Tobacco abuse, and Valvular heart disease. Past Surgical History:  has a past surgical history that includes shoulder surgery (Right); knee surgery (Bilateral); Foot surgery; Upper gastrointestinal endoscopy (8/20/12); Nerve Block (07/16/14); Nerve Block (Right, 11/3/14); Nerve Block (Right, 11 19 14); other surgical history (Right, 2 9 15); Nerve Block (Bilateral, 4/22/2015); Nerve Block (Bilateral, 5/6/15); Nerve Block (Bilateral, 05/13/15); Nerve Block (Right, 6/10/15); Tonsillectomy; Total knee arthroplasty (Left, 7/22/14 at 48years old); Colonoscopy (8/20/12); Rotator cuff repair (1999 at 39 years); joint replacement (Right); Bunionectomy (great toe); Hammer toe surgery; Tonsillectomy; Nerve Block; Nerve Block (Right, 1/18/2016); Nerve Block (Right, 1/25/16); Nerve Block (2/17/15); other surgical history (Right, 3/9/16); Nerve Block (Left, 08/15/2016); Nerve Block (09/07/2016); other surgical history (Right, 11/14/2016); Foot Debridement (11/2016); Nerve Block (Right, 12/07/2016); other surgical history (Left, 12/19/2016); Nerve Block (Right, 04/19/2017); Nerve Block (Right, 09/11/2017); Cardiac catheterization (09/24/2018); and Hysterectomy, total abdominal (2001). Social History:  reports that she has been smoking cigarettes. She has a 20.00 pack-year smoking history. She has never used smokeless tobacco. She reports that she does not drink alcohol or use drugs. Family History: family history includes Arthritis in her mother; Depression in her daughter; High Blood Pressure in her brother, brother, father, and mother; No Known Problems in her son; Osteoarthritis in her father; Other in her father; Rheum Arthritis in her mother. . Unless otherwise noted, family history is non contributory    The patients home medications have been reviewed. Allergies: Patient has no known allergies.         ---------------------------------------------------PHYSICAL EXAM--------------------------------------    Constitutional/General: Alert and oriented x3  Head: Normocephalic and atraumatic  Eyes: PERRL, EOMI, sclera non icteric  Mouth: Oropharynx clear, handling secretions, no trismus, no asymmetry of the posterior oropharynx or uvular edema  Neck: Supple, full ROM, no stridor, no meningeal signs  Respiratory: Lungs clear to auscultation bilaterally, no wheezes, rales, or rhonchi. Not in respiratory distress  Cardiovascular:  Regular rate. Regular rhythm. No murmurs, no aortic murmurs, no gallops, or rubs. 2+ distal pulses. Equal extremity pulses. +1 bilateral pedal edema  Chest: No chest wall tenderness  GI:  Abdomen Soft, Non tender, Non distended. No rebound, guarding, or rigidity. No pulsatile masses. Musculoskeletal: Moves all extremities x 4. Warm and well perfused, no clubbing, cyanosis. Capillary refill <3 seconds  Integument: skin warm and dry. No rashes. Neurologic: GCS 15, no focal deficits, symmetric strength 5/5 in the upper and lower extremities bilaterally  Psychiatric: Normal Affect          -------------------------------------------------- RESULTS -------------------------------------------------  I have personally reviewed all laboratory and imaging results for this patient. Results are listed below.      LABS: (Lab results interpreted by me)  Results for orders placed or performed during the hospital encounter of 03/16/21   CBC Auto Differential   Result Value Ref Range    WBC 11.7 (H) 4.5 - 11.5 E9/L    RBC 4.38 3.50 - 5.50 E12/L    Hemoglobin 13.3 11.5 - 15.5 g/dL    Hematocrit 41.5 34.0 - 48.0 %    MCV 94.7 80.0 - 99.9 fL    MCH 30.4 26.0 - 35.0 pg    MCHC 32.0 32.0 - 34.5 %    RDW 14.6 11.5 - 15.0 fL    Platelets 134 356 - 305 E9/L    MPV 11.0 7.0 - 12.0 fL    Neutrophils % 58.7 43.0 - 80.0 %    Immature Granulocytes % 0.5 0.0 - 5.0 %    Lymphocytes % 28.8 20.0 - 42.0 %    Monocytes % 9.5 2.0 - 12.0 %    Eosinophils % 1.7 0.0 - 6.0 %    Basophils % 0.8 0.0 - 2.0 %    Neutrophils Absolute 6.84 1.80 - 7.30 E9/L    Immature Granulocytes # 0.06 E9/L    Lymphocytes Absolute 3.35 1.50 - 4.00 E9/L    Monocytes Absolute 1.11 (H) 0.10 - 0.95 E9/L    Eosinophils Absolute 0.20 0.05 - 0.50 E9/L    Basophils Absolute 0.09 0.00 - 0.20 E9/L   Comprehensive Metabolic Panel   Result Value Ref Range    Sodium 140 132 - 146 mmol/L    Potassium 3.8 3.5 - 5.0 mmol/L    Chloride 105 98 - 107 mmol/L    CO2 27 22 - 29 mmol/L    Anion Gap 8 7 - 16 mmol/L    Glucose 94 74 - 99 mg/dL    BUN 13 6 - 20 mg/dL    CREATININE 0.9 0.5 - 1.0 mg/dL    GFR Non-African American >60 >=60 mL/min/1.73    GFR African American >60     Calcium 8.2 (L) 8.6 - 10.2 mg/dL    Total Protein 6.5 6.4 - 8.3 g/dL    Albumin 3.8 3.5 - 5.2 g/dL    Total Bilirubin <0.2 0.0 - 1.2 mg/dL    Alkaline Phosphatase 100 35 - 104 U/L    ALT 50 (H) 0 - 32 U/L    AST 27 0 - 31 U/L   Troponin   Result Value Ref Range    Troponin <0.01 0.00 - 0.03 ng/mL   Brain Natriuretic Peptide   Result Value Ref Range    Pro- (H) 0 - 125 pg/mL   EKG 12 Lead   Result Value Ref Range    Ventricular Rate 55 BPM    Atrial Rate 55 BPM    P-R Interval 118 ms    QRS Duration 96 ms    Q-T Interval 456 ms    QTc Calculation (Bazett) 436 ms    P Axis 67 degrees    R Axis 86 degrees    T Axis 26 degrees   ,       RADIOLOGY:  Interpreted by Radiologist unless otherwise specified  CTA PULMONARY W CONTRAST   Final Result   No evidence of pulmonary embolism or acute pulmonary abnormality. Small pulmonary nodules measuring 5 mm or less in diameter. No routine   follow-up recommended. Nonspecific hilar nodes. US DUP LOWER EXTREMITIES BILATERAL VENOUS   Final Result   Within the visualized vessels there is no evidence for deep venous   thrombosis               XR CHEST (2 VW)   Final Result   No acute process.                EKG Interpretation  Interpreted by emergency department physician, Dr. Geneva Fall    Date of EKG: 3/16/21  Time:  Rhythm: sinus bradycardia  Rate: 55  Axis: normal  Conduction: normal  ST Segments: no acute change  T Waves: no acute change    Clinical Impression: No findings suggestive of acute ischemia or injury  Comparison to prior EKG: stable as compared to patient's most recent EKG      ------------------------- NURSING NOTES AND VITALS REVIEWED ---------------------------   The nursing notes within the ED encounter and vital signs as below have been reviewed by myself  BP (!) 136/104   Pulse 62   Temp 97 °F (36.1 °C) (Temporal)   SpO2 95%     Oxygen Saturation Interpretation: Normal    The patients available past medical records and past encounters were reviewed. ------------------------------ ED COURSE/MEDICAL DECISION MAKING----------------------  Medications   iopamidol (ISOVUE-370) 76 % injection 70 mL (70 mLs Intravenous Given 3/16/21 2329)   sodium chloride flush 0.9 % injection 10 mL (10 mLs Intravenous Given 3/16/21 2329)           The cardiac monitor revealed sinus rhythm with a heart rate in the 60s as interpreted by me. The cardiac monitor was ordered secondary to the patient's chest pain and to monitor for patient for dysrhythmia. CPT [id number]           Medical Decision Making:     I, Dr. Radha Hernandez, am the primary provider of record    42-year-old female with cardiac history presenting with dyspnea on exertion and lower leg edema. She arrives hemodynamically stable, no increased work of breathing or hypoxia. EKG shows no signs of ischemia or injury. Chest x-ray is clear. Lower extremity Doppler shows no DVT. Metabolic panel is within acceptable limits, no leukocytosis or anemia. Troponin is negative. BNP is slightly elevated above baseline. CTA of the chest shows no PE, vascular congestion, or suspicion of pneumonia. Patient remained hemodynamically stable with no increased work of breathing in ED, no hypoxia.   Patient is comfortable with continuing her home medication, PCP follow-up with instruction to return for any changes in condition or new symptoms. Re-Evaluations: This patient's ED course included: a personal history and physicial examination, re-evaluation prior to disposition, cardiac monitoring and continuous pulse oximetry    This patient has remained hemodynamically stable and been closely monitored during their ED course. Counseling: The emergency provider has spoken with the patient and discussed todays results, in addition to providing specific details for the plan of care and counseling regarding the diagnosis and prognosis. Questions are answered at this time and they are agreeable with the plan.       --------------------------------- IMPRESSION AND DISPOSITION ---------------------------------    IMPRESSION  1. Acute on chronic congestive heart failure, unspecified heart failure type ([facility] [address]        DISPOSITION  Disposition: Discharge to home  Patient condition is stable        NOTE: This report was transcribed using voice recognition software.  Every effort was made to ensure accuracy; however, inadvertent computerized transcription errors may be present        Margie Evans DO  03/17/21 [id number]

## 2021-03-17 NOTE — ED NOTES
Bed: HB  Expected date:   Expected time:   Means of arrival:   Comments:  triage     Abraham Carver RN  03/16/21 1621

## 2021-03-19 ENCOUNTER — TELEPHONE (OUTPATIENT)
Dept: CASE MANAGEMENT | Age: 61
End: 2021-03-19

## 2021-03-19 NOTE — TELEPHONE ENCOUNTER
I called the patient and left a message reminding her of the CT lung screening at [facility] on 3/22/2021 at 10:00 am with an 9:30 am arrival.  If unable to keep this appt call the office at [phone] to get rescheduled.                 Electronically signed by Dina Mckenna on 3/19/21 at 10:17 AM EDT

## 2021-03-26 ENCOUNTER — OFFICE VISIT (OUTPATIENT)
Dept: FAMILY MEDICINE CLINIC | Age: 61
End: 2021-03-26
Payer: MEDICARE

## 2021-03-26 VITALS
TEMPERATURE: 97.5 F | BODY MASS INDEX: 33.26 KG/M2 | HEIGHT: 59 IN | SYSTOLIC BLOOD PRESSURE: 117 MMHG | DIASTOLIC BLOOD PRESSURE: 68 MMHG | WEIGHT: 165 LBS | HEART RATE: 56 BPM

## 2021-03-26 DIAGNOSIS — R06.02 SHORTNESS OF BREATH: Primary | ICD-10-CM

## 2021-03-26 PROCEDURE — 99213 OFFICE O/P EST LOW 20 MIN: CPT | Performed by: STUDENT IN AN ORGANIZED HEALTH CARE EDUCATION/TRAINING PROGRAM

## 2021-03-26 NOTE — PROGRESS NOTES
[facility]  Department of Family Medicine  Family Medicine Residency Program      Patient:  Esther Rock 64 y.o. female     Date of Service: 3/26/21      Chief complaint:   Chief Complaint   Patient presents with   [facility] ED Follow-up     Pt went for not breathing well, tired and weak. CHF          History ofPresent Illness   The patient is a 64 y.o. female  presented to the clinic with complaints as above. ED follow up  -for SOB  -in ED, found to have mildly elevated white count, CTA was negative for PE however did show small pulm.  Nodules less than 5 mm, no DVT via US, elevated BNP, diagnosed with Acute on chronic CHF and sent home as she was stable  -currently, is feeling better and leg swelling has resolved  -has not smoked since being in the ED  -is also trying to diet, cutting back on eating carbs  -SOB is improving however will get it intermittently  -SOB happens when she takes a deep breath in  -does have some CAMP  -denies any chest pain, fever, chills, nausea, or vomiting    -at least 40 pack year history   -hasn't smoked for 10 days     Past Medical History:      Diagnosis Date    Anxiety and depression 9/11/2013    Baker's cyst, ruptured     CAD (coronary artery disease)     Chronic back pain     ankylosing spondylitis , RA    Chronic myocarditis ([address] 12/1/2016    Diverticulosis of sigmoid colon     Duodenal ulcer     Facet arthropathy     Fibrocystic breast changes     Fracture of metatarsal bone     RIGHT FOOT    Genital warts     GERD (gastroesophageal reflux disease)     Heart attack ([address] 09/15/2018    History of blood transfusion     loss of blood during pregnancy    History of cervical dysplasia 12/16/2013    Hyperlipidemia     Hypertension     IGT (impaired glucose tolerance) 11/11/2015    Myocarditis ([address]     Dr. Lau Glencoe    Obesity     OLIVIA (obstructive sleep apnea)     CPAP    Osteoarthritis of multiple joints 8/13/2013    Osteomyelitis ([address]  Primary insomnia 11/11/2015    Protruded cervical disc/DDD with neural foraminal stenosis 6/10/2015    Pulmonary nodule 9/27/2016    Sacroiliitis ([address] 6/2/2014    Shingles     Tobacco abuse     Valvular heart disease 11/30/2016    Per cardiac MRI 11/8/16-Mild TR/Mild AI Follows with [facility] Dr Abelardo Vides       PastSurgical History:        Procedure Laterality Date    BUNIONECTOMY  great toe    CARDIAC CATHETERIZATION  09/24/2018    COLONOSCOPY  8/20/12    Dr. Otis Costa  11/2016    Dr Asif Doyle great toe/skin    FOOT [facility], TOTAL ABDOMINAL  2001    elective due to abnormal cells    JOINT REPLACEMENT Right     Knee    KNEE SURGERY Bilateral     arthroscopy    NERVE BLOCK  07/16/14    right sacroiliac joint #1    NERVE BLOCK Right 11/3/14    SI injection #2    NERVE BLOCK Right 11 19 14    si inj #3    NERVE BLOCK Bilateral 4/22/2015    greater occipital nerve block  #1    NERVE BLOCK Bilateral 5/6/15    greater occipital nerve block #2    NERVE BLOCK Bilateral 05/13/15    greater occipital nerve block #3    NERVE BLOCK Right 6/10/15    cervical transforaminal #1    NERVE BLOCK      Pain Management    NERVE BLOCK Right 1/18/2016    right hip injection  #1    NERVE BLOCK Right 1/25/16    hip injection #2    NERVE BLOCK  2/17/15    bilateral occipital     NERVE BLOCK Left 08/15/2016    genicular #1    NERVE BLOCK  09/07/2016    left genicular nerve block #2    NERVE BLOCK Right 12/07/2016    hip injection #2    NERVE BLOCK Right 04/19/2017    hip #1    NERVE BLOCK Right 09/11/2017    hip #2    OTHER SURGICAL HISTORY Right 2 9 15    lumbar radiofrequency    OTHER SURGICAL HISTORY Right 3/9/16    lumbar radiofrequency    OTHER SURGICAL HISTORY Right 11/14/2016    right hip injection #1    OTHER SURGICAL HISTORY Left 12/19/2016    genicular radiofrequency    ROTATOR CUFF REPAIR  1999 at 42 years    Dr Jhon Buerger Family History:       Problem Relation Age of Onset    Arthritis Mother     High Blood Pressure Mother     Rheum Arthritis Mother     High Blood Pressure Father     Other Father         emphysema    Osteoarthritis Father     High Blood Pressure Brother     Depression Daughter     High Blood Pressure Brother     No Known Problems Son        Review of Systems:   Review of Systems - as above     Physical Exam   Vitals: /68   Pulse 56   Temp 97.5 °F (36.4 °C) (Temporal)   Ht 4' 11\" (1.499 m)   Wt 165 lb (74.8 kg)   BMI 33.33 kg/m²   Physical Exam  Constitutional:       Appearance: She is well-developed. HENT:      Head: Normocephalic. Cardiovascular:      Rate and Rhythm: Normal rate and regular rhythm. Heart sounds: Normal heart sounds. No murmur. Pulmonary:      Effort: Pulmonary effort is normal. No respiratory distress. Breath sounds: No wheezing. Comments: Diminished breath sounds bilaterally  Abdominal:      General: Bowel sounds are normal.      Palpations: Abdomen is soft. Musculoskeletal: Normal range of motion. General: No tenderness. Skin:     General: Skin is warm and dry. Neurological:      Mental Status: She is alert and oriented to person, place, and time. Psychiatric:         Behavior: Behavior normal.             Assessment and Plan       1. Shortness of breath  ED f/u  -Patient stable and SOB and swelling are improving   -Unclear etiology, however with swelling in legs (which has improved), could be CHF due to valvular disease with tricuspid regurgitation, unclear if lasix would really help this, so advised patient to ask cardiologist about it as she is seeing them in April.  -Does have 40 pack year smoking history, therefore will get PFT's to evaluate for lung pathology  - Full PFT Study With Bronchodilator; Future      Counseled regarding above diagnosis, including possible risks and complications,  especially if left uncontrolled. Counseled regarding the possible side effects, risks, benefits and alternatives to treatment;patient and/or guardian verbalizes understanding, agrees, feels comfortable with and wishes to proceed with above treatment plan. Call or go to [address] [place] if symptoms worsen or persist. Advised patient to call with any new medication issues, and, as applicable, read all Rx info from pharmacy to assure aware of all possible risks and side effects of medicationbefore taking. Patient and/or guardian given opportunity to ask questions/raise concerns. The patient verbalized comfort and understanding ofinstructions. I encourage further reading and education about your health conditions. Information on many health conditions is provided by [facility] [employer]: [email or web address]  Please bring any questions to me at your nextvisit. Return to Office: Return for keep apt with Fidel Villarreal on 6th .     Medication List:    Current Outpatient Medications   Medication Sig Dispense Refill    amLODIPine (NORVASC) 5 MG tablet Take 1 tablet by mouth daily 90 tablet 0    atorvastatin (LIPITOR) 80 MG tablet TAKE 1 TABLET BY MOUTH DAILY 90 tablet 0    chlorthalidone (HYGROTON) 25 MG tablet Take 1 tablet by mouth daily 90 tablet 0    clopidogrel (PLAVIX) 75 MG tablet Take 1 tablet by mouth daily 90 tablet 0    losartan (COZAAR) 50 MG tablet Take 1 tablet by mouth daily 90 tablet 0    metoprolol succinate (TOPROL XL) 100 MG extended release tablet TAKE 1 TABLET BY MOUTH EVERY DAY 90 tablet 0    omeprazole (PRILOSEC) 40 MG delayed release capsule Take 1 capsule by mouth Daily 90 capsule 0    dicyclomine (BENTYL) 10 MG capsule TAKE 1 CAPSULE BY MOUTH FOUR TIMES A DAY BEFORE MEALS 120 capsule 2    aspirin 81 MG chewable tablet CHEW AND SWALLOW 1 TABLET BY MOUTH DAILY 30 tablet 3    ENBREL 25 MG/0.5ML SOSY INJECT 0.5 ML INTO SKIN ONCE A WEEK 2 mL 1    albuterol sulfate HFA (VENTOLIN HFA) 108 (90 Base) MCG/ACT inhaler INHALE 2 PUFFS INTO THE LUNGS EVERY 6 HOURS AS NEEDED FOR WHEEZING OR SHORTNESS OF BREATH 3 Inhaler 3    azelastine (ASTELIN) 0.1 % nasal spray 2 sprays by Nasal route 2 times daily Use in each nostril as directed 3 Bottle 3    Cholecalciferol (VITAMIN D3) 50 MCG (2000 UT) CAPS Take 1 capsule by mouth daily 90 capsule 3    fluticasone (FLONASE) 50 MCG/ACT nasal spray SHAKE LIQUID AND USE 2 SPRAYS IN EACH NOSTRIL DAILY 3 Bottle 3    ranolazine (RANEXA) 1000 MG extended release tablet Take 1 tablet by mouth 2 times daily      zolpidem (AMBIEN) 5 MG tablet Take 5 mg by mouth nightly as needed.  mirtazapine (REMERON) 30 MG tablet Take 1 tablet by mouth nightly as needed (insomnia)      DULoxetine (CYMBALTA) 30 MG extended release capsule Take 30 mg by mouth nightly  2    diclofenac sodium 1 % GEL as needed  0    NARCAN 4 MG/0.1ML LIQD nasal spray as needed  0    HYDROcodone-acetaminophen (NORCO)  MG per tablet 1 tablet 4 times daily. Indications: Pain management       nitroGLYCERIN (NITROSTAT) 0.4 MG SL tablet up to max of 3 total doses. If no relief after 1 dose, call 911. 25 tablet 3    Multiple Vitamins-Minerals (THERAPEUTIC MULTIVITAMIN-MINERALS) tablet Take 1 tablet by mouth daily      meloxicam (MOBIC) 15 MG tablet Take 1 tablet by mouth daily      nicotine (NICODERM CQ) 7 MG/24HR Place 1 patch onto the skin daily for 14 days 30 patch 0     No current facility-administered medications for this visit. Linda Murrell DO       This document may have been prepared at least partially through the use of voice recognition software. Although effort is taken to assure the accuracy ofthis document, it is possible that grammatical, syntax,  or spelling errors may occur.

## 2021-03-26 NOTE — PROGRESS NOTES
[facility]  Precepting Note    Subjective:  Here for ED for for SOB  She had elevated BNP was told she had acute on chronic CHF based on her symptoms and elevated  She has been trying to diet by cutting back on carbs  She has some shortness of breath with deep inspiration  Quit smoking a10 days ago   Having some cravings  No chest pain, fever/ chills, nausea/ vomiting. ROS otherwise as per resident note    Past medical, surgical, family and social history were reviewed, non-contributory, and unchanged unless otherwise stated. Objective:    /68   Pulse 56   Temp 97.5 °F (36.4 °C) (Temporal)   Ht 4' 11\" (1.499 m)   Wt 165 lb (74.8 kg)   BMI 33.33 kg/m²     Exam is as noted by resident     General:  NAD; alert & oriented x 3   Heart:  RRR, no murmurs, gallops, or rubs. Lungs:  CTA bilaterally, no wheeze, rales or rhonchi  Abd: bowel sounds present, nontender, nondistended, no masses  Extrem:  +trace    Assessment/Plan:    Dyspnea - ddx includes CHF/ valvular heart disease vs copd  Somewhat improved - though consideration for diuretics  check pfts  F/u with cardiologist       Attending Physician Statement  I have reviewed the chart, including any radiology or labs, and have seen the patient with the resident(s). I personally reviewed and performed key elements of the history and exam.  I agree with the assessment, plan and orders as documented by the resident. Please refer to the resident note for additional information.       Electronically signed by Juanita Milner MD on 3/26/2021 at 1:21 PM

## 2021-04-05 DIAGNOSIS — R19.7 DIARRHEA, UNSPECIFIED TYPE: ICD-10-CM

## 2021-04-07 RX ORDER — DICYCLOMINE HYDROCHLORIDE 10 MG/1
CAPSULE ORAL
Qty: 120 CAPSULE | Refills: 2 | Status: SHIPPED
Start: 2021-04-07 | End: 2021-06-22

## 2021-04-16 ENCOUNTER — OFFICE VISIT (OUTPATIENT)
Dept: FAMILY MEDICINE CLINIC | Age: 61
End: 2021-04-16
Payer: MEDICARE

## 2021-04-16 VITALS
DIASTOLIC BLOOD PRESSURE: 60 MMHG | HEART RATE: 56 BPM | BODY MASS INDEX: 33.06 KG/M2 | HEIGHT: 59 IN | OXYGEN SATURATION: 98 % | WEIGHT: 164 LBS | TEMPERATURE: 96.6 F | RESPIRATION RATE: 18 BRPM | SYSTOLIC BLOOD PRESSURE: 108 MMHG

## 2021-04-16 DIAGNOSIS — Z11.52 ENCOUNTER FOR SCREENING FOR COVID-19: ICD-10-CM

## 2021-04-16 DIAGNOSIS — R06.02 SHORTNESS OF BREATH: ICD-10-CM

## 2021-04-16 DIAGNOSIS — I10 ESSENTIAL HYPERTENSION: Primary | ICD-10-CM

## 2021-04-16 PROCEDURE — 99213 OFFICE O/P EST LOW 20 MIN: CPT | Performed by: STUDENT IN AN ORGANIZED HEALTH CARE EDUCATION/TRAINING PROGRAM

## 2021-04-16 NOTE — PROGRESS NOTES
[facility]  Precepting Note    Subjective:  ER follow up for ? CHF (edema and SOB)   Swelling in legs- improved   Has OLIVIA- not tolerating CPAP  Has PFT\"s pending but needs covid screening   Feels less swollen and SOB  Sees cardio later this month     ROS otherwise negative     Past medical, surgical, family and social history were reviewed, non-contributory, and unchanged unless otherwise stated. Objective:    /60   Pulse 56   Temp 96.6 °F (35.9 °C) (Temporal)   Resp 18   Ht 4' 11\" (1.499 m)   Wt 164 lb (74.4 kg)   SpO2 98%   BMI 33.12 kg/m²     Exam is as noted by resident with the following changes, additions or corrections:    General:  NAD; alert & oriented x 3   Heart:  RRR, no murmurs, gallops, or rubs. Lungs:  CTA bilaterally, no wheeze, rales or rhonchi  Extrem:  No clubbing, cyanosis, or edema    Assessment/Plan:  Swelling   Improved; await PFT's   Dyspnea   As above     Attending Physician Statement  I have reviewed the chart, including any radiology or labs. I have discussed the case, including pertinent history and exam findings with the resident. I agree with the assessment, plan and orders as documented by the resident. Please refer to the resident note for additional information.       Electronically signed by Patrizia Pappas MD on 4/16/2021 at 11:33 AM

## 2021-04-16 NOTE — PROGRESS NOTES
[facility]  Department of Family Medicine  Family Medicine Residency Program      Patient:  Cassie Richards 64 y.o. female     Date of Service: 4/16/21      Chief complaint:   Chief Complaint   Patient presents with    Hypertension         History of Present Illness   The patient is a 64 y.o. female  presented to the clinic with complaints as above. Overall feels well. Here to follow up for multiple issues    She had a recent ER visit for SOB, leg swelling  -unremarkable CTA and negative doppler u/s  -echo in December showed normal EF, no comment on diastolic function, \"mildly elevated\" pulmonary artery systolic pressure  -she has PFTs pending and her symptoms of leg swelling and SOB have resolved. She feels at her baseline today  -also has a history of OLIVIA but admits poor compliance with CPAP due to comfort issues    HTN  -controlled in office today. Taking metoprolol, chlorthalidone, losartan  -no chest pain, headaches, vision changes  BP Readings from Last 3 Encounters:   04/16/21 108/60   03/26/21 117/68   03/16/21 (!) 136/104         Past Medical History:      PastSurgical History: Allergies:    Patient has no known allergies. Review of Systems:   Review of Systems   Constitutional: Negative for chills and fever. HENT: Negative for congestion and rhinorrhea. Respiratory: Negative for cough and shortness of breath. Cardiovascular: Negative for chest pain and leg swelling. Gastrointestinal: Negative for abdominal pain, nausea and vomiting. Genitourinary: Negative for dysuria and hematuria. Musculoskeletal: Negative for arthralgias and myalgias. Skin: Negative for rash and wound. Neurological: Negative for dizziness and light-headedness.        Physical Exam   Vitals: /60   Pulse 56   Temp 96.6 °F (35.9 °C) (Temporal)   Resp 18   Ht 4' 11\" (1.499 m)   Wt 164 lb (74.4 kg)   SpO2 98%   BMI 33.12 kg/m²   Physical Exam    General:  Awake, alert, oriented X 3. Well developed, well nourished, well groomed. No apparent distress. HEENT:  Normocephalic, atraumatic. No scleral icterus. No conjunctival injection. Heart:  RRR, no murmurs, gallops, or rubs  Lungs:  CTA bilaterally, bilat symmetrical expansion, no wheeze, rales, or rhonchi  Abdomen: Bowel sounds present, soft, nontender, no peritoneal signs  Extremities:  No clubbing, cyanosis, or edema  Neuro:  Cranial nerves 2-12 intact, no focal deficits      Assessment and Plan       1. Essential hypertension  - At goal in office today on current regimen, continue same  - Encouraged CPAP use, to discuss with rx provider about face mask fits etc to encourage compliance    3. Shortness of breath  -PFTs pending, SOB has resolved as well as leg swelling. Possible component of diastolic hear failure  -increased pulmonary artery pressures possibly secondary to lung pathology, OLIVIA-need to continue to be considered in future workups  -upcoming appointment with cardiology    2. Encounter for screening for COVID-19  - COVID-19 Ambulatory; Future          Counseled regarding above diagnosis, including possible risks and complications,  especially if left uncontrolled. Counseled regarding the possible side effects, risks, benefits and alternatives to treatment;patient and/or guardian verbalizes understanding, agrees, feels comfortable with and wishes to proceed with above treatment plan. Call or go to ED immediately if symptoms worsen or persist. Advised patient to call with any new medication issues, and, as applicable, read all Rx info from pharmacy to assure aware of all possible risks and side effects of medicationbefore taking. Patient and/or guardian given opportunity to ask questions/raise concerns. The patient verbalized comfort and understanding ofinstructions. I encourage further reading and education about your health conditions.   Information on many health conditions is provided by theAmerican Academy of Family Physicians: [email or web address] [email or web address]  Please bring any questions to me at your nextvisit. Return to Office: No follow-ups on file.     Medication List:    Current Outpatient Medications   Medication Sig Dispense Refill    dicyclomine (BENTYL) 10 MG capsule TAKE 1 CAPSULE BY MOUTH FOUR TIMES A DAY BEFORE MEALS 120 capsule 2    ENBREL 50 MG/ML injection       oxyCODONE-acetaminophen (PERCOCET)  MG per tablet TAKE 1 TABLET BY MOUTH EVERY 6 HOURS AS NEEDED      hydrOXYzine (VISTARIL) 50 MG capsule Take 50 mg by mouth 2 times daily as needed      amLODIPine (NORVASC) 5 MG tablet Take 1 tablet by mouth daily 90 tablet 0    atorvastatin (LIPITOR) 80 MG tablet TAKE 1 TABLET BY MOUTH DAILY 90 tablet 0    chlorthalidone (HYGROTON) 25 MG tablet Take 1 tablet by mouth daily 90 tablet 0    clopidogrel (PLAVIX) 75 MG tablet Take 1 tablet by mouth daily 90 tablet 0    losartan (COZAAR) 50 MG tablet Take 1 tablet by mouth daily 90 tablet 0    metoprolol succinate (TOPROL XL) 100 MG extended release tablet TAKE 1 TABLET BY MOUTH EVERY DAY 90 tablet 0    omeprazole (PRILOSEC) 40 MG delayed release capsule Take 1 capsule by mouth Daily 90 capsule 0    aspirin 81 MG chewable tablet CHEW AND SWALLOW 1 TABLET BY MOUTH DAILY 30 tablet 3    albuterol sulfate HFA (VENTOLIN HFA) 108 (90 Base) MCG/ACT inhaler INHALE 2 PUFFS INTO THE LUNGS EVERY 6 HOURS AS NEEDED FOR WHEEZING OR SHORTNESS OF BREATH 3 Inhaler 3    azelastine (ASTELIN) 0.1 % nasal spray 2 sprays by Nasal route 2 times daily Use in each nostril as directed 3 Bottle 3    Cholecalciferol (VITAMIN D3) 50 MCG (2000 UT) CAPS Take 1 capsule by mouth daily 90 capsule 3    fluticasone (FLONASE) 50 MCG/ACT nasal spray SHAKE LIQUID AND USE 2 SPRAYS IN EACH NOSTRIL DAILY 3 Bottle 3    ranolazine (RANEXA) 1000 MG extended release tablet Take 1 tablet by mouth 2 times daily      meloxicam (MOBIC) 15 MG tablet Take 1 tablet by mouth daily     

## 2021-04-18 ASSESSMENT — ENCOUNTER SYMPTOMS
ABDOMINAL PAIN: 0
VOMITING: 0
COUGH: 0
SHORTNESS OF BREATH: 0
RHINORRHEA: 0
NAUSEA: 0

## 2021-06-02 DIAGNOSIS — I10 ESSENTIAL HYPERTENSION: ICD-10-CM

## 2021-06-02 DIAGNOSIS — I25.10 CAD IN NATIVE ARTERY: Chronic | ICD-10-CM

## 2021-06-02 DIAGNOSIS — K21.9 GASTROESOPHAGEAL REFLUX DISEASE, UNSPECIFIED WHETHER ESOPHAGITIS PRESENT: Chronic | ICD-10-CM

## 2021-06-02 NOTE — TELEPHONE ENCOUNTER
Last Appointment   4/16/2021  Next Appointment  7/29/2021    Patient needs refills before next appointment.

## 2021-06-03 RX ORDER — LOSARTAN POTASSIUM 50 MG/1
50 TABLET ORAL DAILY
Qty: 90 TABLET | Refills: 0 | Status: SHIPPED
Start: 2021-06-03 | End: 2021-08-31 | Stop reason: SDUPTHER

## 2021-06-03 RX ORDER — CHLORTHALIDONE 25 MG/1
25 TABLET ORAL DAILY
Qty: 90 TABLET | Refills: 0 | Status: SHIPPED
Start: 2021-06-03 | End: 2021-10-19

## 2021-06-03 RX ORDER — CLOPIDOGREL BISULFATE 75 MG/1
75 TABLET ORAL DAILY
Qty: 90 TABLET | Refills: 0 | Status: SHIPPED
Start: 2021-06-03 | End: 2021-12-28 | Stop reason: SDUPTHER

## 2021-06-03 RX ORDER — ATORVASTATIN CALCIUM 80 MG/1
TABLET, FILM COATED ORAL
Qty: 90 TABLET | Refills: 0 | Status: SHIPPED
Start: 2021-06-03 | End: 2021-12-28 | Stop reason: SDUPTHER

## 2021-06-03 RX ORDER — ASPIRIN 81 MG/1
TABLET, CHEWABLE ORAL
Qty: 90 TABLET | Refills: 0 | Status: SHIPPED
Start: 2021-06-03 | End: 2022-04-01 | Stop reason: SDUPTHER

## 2021-06-03 RX ORDER — AMLODIPINE BESYLATE 5 MG/1
5 TABLET ORAL DAILY
Qty: 90 TABLET | Refills: 0 | Status: SHIPPED
Start: 2021-06-03 | End: 2021-12-28 | Stop reason: SDUPTHER

## 2021-06-03 RX ORDER — METOPROLOL SUCCINATE 100 MG/1
TABLET, EXTENDED RELEASE ORAL
Qty: 90 TABLET | Refills: 0 | Status: SHIPPED
Start: 2021-06-03 | End: 2021-08-31 | Stop reason: SDUPTHER

## 2021-06-03 RX ORDER — OMEPRAZOLE 40 MG/1
40 CAPSULE, DELAYED RELEASE ORAL DAILY
Qty: 90 CAPSULE | Refills: 0 | Status: SHIPPED
Start: 2021-06-03 | End: 2021-12-07

## 2021-06-21 DIAGNOSIS — R19.7 DIARRHEA, UNSPECIFIED TYPE: ICD-10-CM

## 2021-06-22 RX ORDER — DICYCLOMINE HYDROCHLORIDE 10 MG/1
CAPSULE ORAL
Qty: 120 CAPSULE | Refills: 2 | Status: SHIPPED
Start: 2021-06-22 | End: 2021-09-07

## 2021-07-02 ENCOUNTER — HOSPITAL ENCOUNTER (EMERGENCY)
Age: 61
Discharge: HOME | End: 2021-07-02
Payer: MEDICARE

## 2021-07-02 VITALS
BODY MASS INDEX: 31.49 KG/M2 | SYSTOLIC BLOOD PRESSURE: 123 MMHG | HEART RATE: 70 BPM | OXYGEN SATURATION: 96 % | WEIGHT: 150 LBS | RESPIRATION RATE: 16 BRPM | HEIGHT: 58 IN | DIASTOLIC BLOOD PRESSURE: 76 MMHG | TEMPERATURE: 97.2 F

## 2021-07-02 DIAGNOSIS — K02.9 PAIN DUE TO DENTAL CARIES: ICD-10-CM

## 2021-07-02 DIAGNOSIS — S02.5XXB OPEN FRACTURE OF TOOTH, INITIAL ENCOUNTER: Primary | ICD-10-CM

## 2021-07-02 DIAGNOSIS — K08.89 PAIN, DENTAL: ICD-10-CM

## 2021-07-02 PROCEDURE — 99283 EMERGENCY DEPT VISIT LOW MDM: CPT

## 2021-07-02 PROCEDURE — 6370000000 HC RX 637 (ALT 250 FOR IP): Performed by: PHYSICIAN ASSISTANT

## 2021-07-02 RX ORDER — LIDOCAINE HYDROCHLORIDE 20 MG/ML
15 SOLUTION OROPHARYNGEAL PRN
Qty: 1 BOTTLE | Refills: 0 | Status: SHIPPED | OUTPATIENT
Start: 2021-07-02 | End: 2021-07-09

## 2021-07-02 RX ORDER — ONDANSETRON 4 MG/1
4 TABLET, FILM COATED ORAL EVERY 12 HOURS PRN
Qty: 14 TABLET | Refills: 0 | Status: SHIPPED | OUTPATIENT
Start: 2021-07-02 | End: 2021-07-09

## 2021-07-02 RX ORDER — ACETAMINOPHEN 500 MG
1000 TABLET ORAL ONCE
Status: COMPLETED | OUTPATIENT
Start: 2021-07-02 | End: 2021-07-02

## 2021-07-02 RX ORDER — AMOXICILLIN AND CLAVULANATE POTASSIUM 875; 125 MG/1; MG/1
1 TABLET, FILM COATED ORAL ONCE
Status: COMPLETED | OUTPATIENT
Start: 2021-07-02 | End: 2021-07-02

## 2021-07-02 RX ORDER — ONDANSETRON 4 MG/1
4 TABLET, ORALLY DISINTEGRATING ORAL ONCE
Status: COMPLETED | OUTPATIENT
Start: 2021-07-02 | End: 2021-07-02

## 2021-07-02 RX ORDER — CHLORHEXIDINE GLUCONATE 0.12 MG/ML
15 RINSE ORAL 2 TIMES DAILY
Qty: 420 ML | Refills: 0 | Status: SHIPPED | OUTPATIENT
Start: 2021-07-02 | End: 2021-07-16

## 2021-07-02 RX ORDER — AMOXICILLIN AND CLAVULANATE POTASSIUM 875; 125 MG/1; MG/1
1 TABLET, FILM COATED ORAL 2 TIMES DAILY WITH MEALS
Qty: 19 TABLET | Refills: 0 | Status: SHIPPED | OUTPATIENT
Start: 2021-07-02 | End: 2021-07-12

## 2021-07-02 RX ADMIN — ONDANSETRON 4 MG: 4 TABLET, ORALLY DISINTEGRATING ORAL at 16:20

## 2021-07-02 RX ADMIN — ACETAMINOPHEN 1000 MG: 500 TABLET ORAL at 16:20

## 2021-07-02 RX ADMIN — AMOXICILLIN AND CLAVULANATE POTASSIUM 1 TABLET: 875; 125 TABLET, FILM COATED ORAL at 16:20

## 2021-07-02 ASSESSMENT — PAIN SCALES - GENERAL
PAINLEVEL_OUTOF10: 6
PAINLEVEL_OUTOF10: 9

## 2021-07-02 NOTE — ED PROVIDER NOTES
[address]  Department of Emergency Medicine   ED  Encounter Note  Admit Date/RoomTime: 2021  3:38 PM  ED Room: ST/ST-1    NAME: Fabiano Bobby  : 1960  MRN: [id number]     Chief Complaint:  Dental Pain (x2 days with swelling and increasing pain.)    History of Present Illness        Fabiano Bobby is a 64 y.o. old female who presents to the emergency department by private vehicle, for non-traumatic left lower molar pain, which occured 2 day(s) prior to arrival. Since onset the symptoms have been persistent and mild in severity. Worsened by  chewing and improved by nothing. Associated Signs & Symptoms:  facial pain left. Patient states that she does not have a dentist and has not been able to get into 1. Patient states she is a smoker. Patient states she can still tolerate food and drink and just has pain. Patient states she has not tried anything to make this better or worse. Described as dull ache. Patient denies any trauma or falls. Patient is not any blood thinners. Patient denies any recent illnesses           Onset:       Spontaneous:   yes. Following Trauma:   no.     Previous Caries:   yes. Recent Dental Procedure:   no.     ROS   Pertinent positives and negatives are stated within HPI, all other systems reviewed and are negative.     Past Medical History:  has a past medical history of Anxiety and depression, Baker's cyst, ruptured, CAD (coronary artery disease), Chronic back pain, Chronic myocarditis ([address] Diverticulosis of sigmoid colon, Duodenal ulcer, Facet arthropathy, Fibrocystic breast changes, Fracture of metatarsal bone, Genital warts, GERD (gastroesophageal reflux disease), Heart attack ([address] History of blood transfusion, History of cervical dysplasia, Hyperlipidemia, Hypertension, IGT (impaired glucose tolerance), Myocarditis ([address] Obesity, OLVIIA (obstructive sleep apnea), Osteoarthritis of multiple joints, Osteomyelitis [facility]), Primary insomnia, Protruded cervical disc/DDD with neural foraminal stenosis, Pulmonary nodule, Sacroiliitis ([address] Shingles, Tobacco abuse, and Valvular heart disease. Surgical History:  has a past surgical history that includes shoulder surgery (Right); knee surgery (Bilateral); Foot surgery; Upper gastrointestinal endoscopy (8/20/12); Nerve Block (07/16/14); Nerve Block (Right, 11/3/14); Nerve Block (Right, 11 19 14); other surgical history (Right, 2 9 15); Nerve Block (Bilateral, 4/22/2015); Nerve Block (Bilateral, 5/6/15); Nerve Block (Bilateral, 05/13/15); Nerve Block (Right, 6/10/15); Tonsillectomy; Total knee arthroplasty (Left, 7/22/14 at 48years old); Colonoscopy (8/20/12); Rotator cuff repair (1999 at 39 years); joint replacement (Right); Bunionectomy (great toe); Hammer toe surgery; Tonsillectomy; Nerve Block; Nerve Block (Right, 1/18/2016); Nerve Block (Right, 1/25/16); Nerve Block (2/17/15); other surgical history (Right, 3/9/16); Nerve Block (Left, 08/15/2016); Nerve Block (09/07/2016); other surgical history (Right, 11/14/2016); Foot Debridement (11/2016); Nerve Block (Right, 12/07/2016); other surgical history (Left, 12/19/2016); Nerve Block (Right, 04/19/2017); Nerve Block (Right, 09/11/2017); Cardiac catheterization (09/24/2018); and Hysterectomy, total abdominal (2001). Social History:  reports that she quit smoking about 2 months ago. Her smoking use included cigarettes. She has a 20.00 pack-year smoking history. She has never used smokeless tobacco. She reports that she does not drink alcohol and does not use drugs. Family History: family history includes Arthritis in her mother; Depression in her daughter; High Blood Pressure in her brother, brother, father, and mother; No Known Problems in her son; Osteoarthritis in her father; Other in her father; Rheum Arthritis in her mother. Allergies: Patient has no known allergies.     Physical Exam   Oxygen Saturation Interpretation: Normal.        ED Triage Vitals   BP Temp Temp src Pulse Resp SpO2 Height Weight   07/02/21 1539 07/02/21 1531 -- 07/02/21 1531 07/02/21 1539 07/02/21 1531 07/02/21 1539 07/02/21 1539   123/76 97.2 °F (36.2 °C)  71 16 96 % 4' 10\" (1.473 m) 150 lb (68 kg)         · Constitutional:  Alert, development consistent with age. · HEENT:  NC/NT. Airway patent. · Neck:  Supple. Normal ROM. · Lips:  upper and lower normal.  · Mouth:  normal tongue and buccal mucosa. · Dental:  Left lower tooth 19 open fractured cracked tooth. No evidence of an apical abscess of back base. Some tenderness with palpation. No evidence of a retropharyngeal or peritonsillar abscess. Patient can open and close mouth with ease. .                    Trismus: No.         Drooling: No.           Airway stridor: No.  · Facial skin: left tenderness. · Respiratory:  Clear to auscultation and breath sounds equal.    · CV: Regular rate and rhythm, normal heart sounds, without pathological murmurs, ectopy, gallops, or rubs. · Skin:  No rashes, erythema or lesions present, unless noted elsewhere. .  · Lymphatics: No lymphangitis or adenopathy noted. · Neurological:  Oriented. Motor functions intact. Lab / Imaging Results   (All laboratory and radiology results have been personally reviewed by myself)  Labs:  No results found for this visit on 07/02/21. Imaging: All Radiology results interpreted by Radiologist unless otherwise noted. No orders to display     ED Course / Medical Decision Making     Medications   amoxicillin-clavulanate (AUGMENTIN) 875-125 MG per tablet 1 tablet (1 tablet Oral Given 7/2/21 1620)   ondansetron (ZOFRAN-ODT) disintegrating tablet 4 mg (4 mg Oral Given 7/2/21 1620)   acetaminophen (TYLENOL) tablet 1,000 mg (1,000 mg Oral Given 7/2/21 1620)        Consult(s):   Dental Resident was not consulted to see patient regarding complaint.     Procedure(s):   None      MDM: Patient is a 51-year-old female that discharge plan. Patient in no acute distress and non-toxic in appearance. Plan of Care/Counseling:  Corey Zavala PA-C reviewed today's visit with the patient in addition to providing specific details for the plan of care and counseling regarding the diagnosis and prognosis. Questions are answered at this time and are agreeable with the plan. .    Assessment      1. Open fracture of tooth, initial encounter    2. Pain, dental    3. Pain due to dental caries      Plan   Discharged home. Patient condition is stable    New Medications     New Prescriptions    AMOXICILLIN-CLAVULANATE (AUGMENTIN) 875-125 MG PER TABLET    Take 1 tablet by mouth 2 times daily (with meals) for 10 days    CHLORHEXIDINE (PERIDEX) 0.12 % SOLUTION    Take 15 mLs by mouth 2 times daily for 14 days Swish and spit    LIDOCAINE VISCOUS HCL (XYLOCAINE) 2 % SOLN SOLUTION    Take 15 mLs by mouth as needed for Irritation or Pain Swish and spit    ONDANSETRON (ZOFRAN) 4 MG TABLET    Take 1 tablet by mouth every 12 hours as needed for Nausea or Vomiting     Electronically signed by Corey Zavlaa PA-C   DD: 7/2/21  **This report was transcribed using voice recognition software. Every effort was made to ensure accuracy; however, inadvertent computerized transcription errors may be present.   END OF ED PROVIDER NOTE       Corey Zavala PA-C  07/02/21 8021

## 2021-08-31 ENCOUNTER — TELEPHONE (OUTPATIENT)
Dept: FAMILY MEDICINE CLINIC | Age: 61
End: 2021-08-31

## 2021-08-31 DIAGNOSIS — I10 ESSENTIAL HYPERTENSION: ICD-10-CM

## 2021-08-31 DIAGNOSIS — I25.10 CAD IN NATIVE ARTERY: Chronic | ICD-10-CM

## 2021-08-31 RX ORDER — LOSARTAN POTASSIUM 50 MG/1
50 TABLET ORAL DAILY
Qty: 90 TABLET | Refills: 0 | Status: SHIPPED
Start: 2021-08-31 | End: 2021-10-20 | Stop reason: SDUPTHER

## 2021-08-31 RX ORDER — METOPROLOL SUCCINATE 100 MG/1
TABLET, EXTENDED RELEASE ORAL
Qty: 90 TABLET | Refills: 0 | Status: SHIPPED
Start: 2021-08-31 | End: 2021-12-07

## 2021-08-31 NOTE — TELEPHONE ENCOUNTER
Refills needed on:    Metoprolol 100 mg    Losartan 50 mg      Eleuterio Bowman would like patient to have another sleep study done. She had one done 5 years ago, but never wore her CPAP machine. She is having a hard time breathing at night with frequent awakening and snoring.

## 2021-08-31 NOTE — TELEPHONE ENCOUNTER
Please let me know if patient is willing to have a sleep study. If so , I will order.  May need to complete epworth scale etc.

## 2021-09-07 DIAGNOSIS — R19.7 DIARRHEA, UNSPECIFIED TYPE: ICD-10-CM

## 2021-09-07 RX ORDER — DICYCLOMINE HYDROCHLORIDE 10 MG/1
CAPSULE ORAL
Qty: 120 CAPSULE | Refills: 2 | Status: SHIPPED
Start: 2021-09-07 | End: 2021-11-23

## 2021-09-20 RX ORDER — ALBUTEROL SULFATE 90 UG/1
AEROSOL, METERED RESPIRATORY (INHALATION)
Qty: 20.1 G | OUTPATIENT
Start: 2021-09-20

## 2021-09-20 RX ORDER — AZELASTINE 1 MG/ML
SPRAY, METERED NASAL
Qty: 90 ML | OUTPATIENT
Start: 2021-09-20

## 2021-09-23 ENCOUNTER — OFFICE VISIT (OUTPATIENT)
Dept: FAMILY MEDICINE CLINIC | Age: 61
End: 2021-09-23
Payer: COMMERCIAL

## 2021-09-23 VITALS
OXYGEN SATURATION: 98 % | BODY MASS INDEX: 36.03 KG/M2 | HEART RATE: 77 BPM | DIASTOLIC BLOOD PRESSURE: 70 MMHG | RESPIRATION RATE: 18 BRPM | WEIGHT: 167 LBS | SYSTOLIC BLOOD PRESSURE: 108 MMHG | HEIGHT: 57 IN | TEMPERATURE: 97.7 F

## 2021-09-23 DIAGNOSIS — Z87.898 HISTORY OF PREDIABETES: ICD-10-CM

## 2021-09-23 DIAGNOSIS — Z23 NEED FOR INFLUENZA VACCINATION: ICD-10-CM

## 2021-09-23 DIAGNOSIS — I10 ESSENTIAL HYPERTENSION: Primary | ICD-10-CM

## 2021-09-23 DIAGNOSIS — J30.9 ALLERGIC RHINITIS, UNSPECIFIED SEASONALITY, UNSPECIFIED TRIGGER: ICD-10-CM

## 2021-09-23 PROCEDURE — 90471 IMMUNIZATION ADMIN: CPT | Performed by: FAMILY MEDICINE

## 2021-09-23 PROCEDURE — 90674 CCIIV4 VAC NO PRSV 0.5 ML IM: CPT | Performed by: FAMILY MEDICINE

## 2021-09-23 PROCEDURE — 99214 OFFICE O/P EST MOD 30 MIN: CPT | Performed by: STUDENT IN AN ORGANIZED HEALTH CARE EDUCATION/TRAINING PROGRAM

## 2021-09-23 RX ORDER — FLUTICASONE PROPIONATE 50 MCG
2 SPRAY, SUSPENSION (ML) NASAL DAILY
Qty: 1 EACH | Refills: 3 | Status: SHIPPED
Start: 2021-09-23 | End: 2021-10-05

## 2021-09-23 RX ORDER — CETIRIZINE HYDROCHLORIDE 10 MG/1
10 TABLET ORAL DAILY
Qty: 90 TABLET | Refills: 1 | Status: SHIPPED
Start: 2021-09-23 | End: 2022-04-01 | Stop reason: SDUPTHER

## 2021-09-23 ASSESSMENT — ENCOUNTER SYMPTOMS
VOMITING: 0
TROUBLE SWALLOWING: 0
COUGH: 0
SINUS PAIN: 0
NAUSEA: 0
DIARRHEA: 0
SINUS PRESSURE: 0
EYE PAIN: 0
RHINORRHEA: 1
SORE THROAT: 0
ABDOMINAL PAIN: 0
SHORTNESS OF BREATH: 0
CONSTIPATION: 0
ABDOMINAL DISTENTION: 0
PHOTOPHOBIA: 0

## 2021-09-23 NOTE — PROGRESS NOTES
Subjective:    Tiffany is here for a hypertension follow up. She is a number of medications. She also has OLIVIA and is on C-pap. She has noticed some mucous like drainage recently. ROS: Otherwise negative    Patient Active Problem List   Diagnosis    Osteoarthritis of multiple joints    Anxiety and depression    IGT (impaired glucose tolerance)    Essential hypertension    Diverticulosis of sigmoid colon    GERD (gastroesophageal reflux disease)    Osteoarthritis of bilateral hip    Facet syndrome, lumbar    Pulmonary nodule    Valvular heart disease    Deformity of right foot    Chronic myocarditis (HCC)    Chronic pain syndrome    HLA B27 (HLA B27 positive)    OLIVIA (obstructive sleep apnea)    Mixed hyperlipidemia    CAD in native artery    Unstable angina (HCC)    Chest pain    Status post non-ST elevation myocardial infarction (NSTEMI)    Vertigo    Obesity    Chronic back pain    Major depressive disorder, single episode, in partial remission ([facility]    Personal history of tobacco use, presenting hazards to health    S/P cardiac catheterization    Inflammatory spondylopathy of lumbosacral region [facility])       Past medical, surgical, family and social history were reviewed, non-contributory, and unchanged unless otherwise stated. Objective:    /70   Pulse 77   Temp 97.7 °F (36.5 °C) (Temporal)   Resp 18   Ht 4' 9\" (1.448 m)   Wt 167 lb (75.8 kg)   SpO2 98%   BMI 36.14 kg/m²     Exam is as noted by resident with the following changes, additions or corrections:      Assessment/Plan:        Tiffany was seen today for hypertension. Diagnoses and all orders for this visit:    Essential hypertension  -     CBC Auto Differential; Future  -     Basic Metabolic Panel; Future  -     LIPID PANEL; Future    Allergic rhinitis, unspecified seasonality, unspecified trigger  -     cetirizine (ZYRTEC) 10 MG tablet;  Take 1 tablet by mouth daily  -     fluticasone (FLONASE) 50 MCG/ACT nasal spray; 2 sprays by Nasal route daily SHAKE LIQUID AND USE 2 SPRAYS IN EACH NOSTRIL DAILY    History of prediabetes  -     HEMOGLOBIN A1C; Future    Need for influenza vaccination  -     INFLUENZA, MDCK QUADV, 2 YRS AND OLDER, IM, PF, PREFILL SYR OR SDV, 0.5ML (FLUCELVAX QUADV, PF)           Attending Physician Statement    I have reviewed the chart, including any radiology or labs. I have discussed the case, including pertinent history and exam findings with the resident. I agree with the assessment, plan and orders as documented by the resident. Please refer to the resident note for additional information.       Electronically signed by John Marquez DO on 9/27/2021 at 8:34 AM

## 2021-09-23 NOTE — PROGRESS NOTES
6 HOURS AS NEEDED      hydrOXYzine (VISTARIL) 50 MG capsule Take 50 mg by mouth 2 times daily as needed      albuterol sulfate HFA (VENTOLIN HFA) 108 (90 Base) MCG/ACT inhaler INHALE 2 PUFFS INTO THE LUNGS EVERY 6 HOURS AS NEEDED FOR WHEEZING OR SHORTNESS OF BREATH 3 Inhaler 3    azelastine (ASTELIN) 0.1 % nasal spray 2 sprays by Nasal route 2 times daily Use in each nostril as directed 3 Bottle 3    Cholecalciferol (VITAMIN D3) 50 MCG (2000 UT) CAPS Take 1 capsule by mouth daily 90 capsule 3    ranolazine (RANEXA) 1000 MG extended release tablet Take 1 tablet by mouth 2 times daily      zolpidem (AMBIEN) 5 MG tablet Take 5 mg by mouth nightly as needed.  mirtazapine (REMERON) 30 MG tablet Take 1 tablet by mouth nightly as needed (insomnia)      diclofenac sodium 1 % GEL as needed  0    NARCAN 4 MG/0.1ML LIQD nasal spray as needed  0    nitroGLYCERIN (NITROSTAT) 0.4 MG SL tablet up to max of 3 total doses. If no relief after 1 dose, call 911. 25 tablet 3    Multiple Vitamins-Minerals (THERAPEUTIC MULTIVITAMIN-MINERALS) tablet Take 1 tablet by mouth daily      nicotine (NICODERM CQ) 7 MG/24HR Place 1 patch onto the skin daily for 14 days 30 patch 0     No current facility-administered medications for this visit.       No Known Allergies    Past Medical & Surgical History:      Diagnosis Date    Anxiety and depression 9/11/2013    Baker's cyst, ruptured     CAD (coronary artery disease)     Chronic back pain     ankylosing spondylitis , RA    Chronic [facility]itis ([facility] 12/1/2016    Diverticulosis of sigmoid colon     Duodenal ulcer     Facet arthropathy     Fibrocystic breast changes     Fracture of metatarsal bone     RIGHT FOOT    Genital warts     GERD (gastroesophageal reflux disease)     Heart attack ([facility] 09/15/2018    History of blood transfusion     loss of blood during pregnancy    History of cervical dysplasia 12/16/2013    Hyperlipidemia     Hypertension     IGT (impaired HISTORY Right 2016    right hip injection #1    OTHER SURGICAL HISTORY Left 2016    genicular radiofrequency    ROTATOR CUFF REPAIR   at 42 years    Dr Sandra Mcgovern Left 14 at 48years old    Dr. Lali Montejo  12       Family History:      Problem Relation Age of Onset    Arthritis Mother     High Blood Pressure Mother     Rheum Arthritis Mother     High Blood Pressure Father     Other Father         emphysema    Osteoarthritis Father     High Blood Pressure Brother     Depression Daughter     High Blood Pressure Brother     No Known Problems Son        Social History:  Social History     Tobacco Use    Smoking status: Former Smoker     Packs/day: 0.50     Years: 40.00     Pack years: 20.00     Types: Cigarettes     Quit date: 4/10/2021     Years since quittin.4    Smokeless tobacco: Never Used    Tobacco comment: 20 down to .5 PPD/ started at age 21-quit three times previously   Substance Use Topics    Alcohol use: No     Alcohol/week: 0.0 standard drinks       Immunization History   Administered Date(s) Administered    COVID-19, [place] Peter, PF, 30mcg/0.3mL 2021, 2021    Influenza 10/28/2013    Influenza Vaccine, unspecified formulation 10/28/2013, 10/03/2017    Influenza Virus Vaccine 2015, 2016    Influenza, High Dose (Fluzone 65 yrs and older) 10/18/2017    Influenza, MDCK Quadv, IM, PF (Flucelvax 2 yrs and older) 2021    Influenza, Quadv, IM, (6 mo and older Fluzone, Flulaval, Fluarix and 3 yrs and older Afluria) 2016    Influenza, Quadv, IM, PF (6 mo and older Fluzone, Flulaval, Fluarix, and 3 yrs and older Afluria) 10/03/2017, 2019, 2020    Pneumococcal Conjugate 13-valent ([id number]) 2017    Pneumococcal Polysaccharide ([id number]) 2019    Tdap (Boostrix, Adacel) 07/20/2017       Review of Systems   Constitutional: Negative for activity change, appetite change, chills, fatigue and fever. HENT: Positive for congestion, postnasal drip and rhinorrhea. Negative for sinus pressure, sinus pain, sore throat and trouble swallowing. Eyes: Negative for photophobia, pain and visual disturbance. Respiratory: Negative for cough and shortness of breath. Cardiovascular: Negative for chest pain, palpitations and leg swelling. Gastrointestinal: Negative for abdominal distention, abdominal pain, constipation, diarrhea, nausea and vomiting. Endocrine: Negative for heat intolerance and polydipsia. Genitourinary: Negative for difficulty urinating, dysuria, frequency and hematuria. Musculoskeletal: Negative for joint swelling, neck pain and neck stiffness. Skin: Negative for rash and wound. Neurological: Negative for dizziness, syncope, weakness, light-headedness, numbness and headaches. Psychiatric/Behavioral: Negative for agitation, behavioral problems and confusion. VS:  /70   Pulse 77   Temp 97.7 °F (36.5 °C) (Temporal)   Resp 18   Ht 4' 9\" (1.448 m)   Wt 167 lb (75.8 kg)   SpO2 98%   BMI 36.14 kg/m²     Physical Exam  Constitutional:       General: She is not in acute distress. Appearance: Normal appearance. She is obese. She is not ill-appearing. HENT:      Head: Normocephalic and atraumatic. Right Ear: External ear normal.      Left Ear: External ear normal.      Nose: Mucosal edema and rhinorrhea present. No nasal deformity or congestion. Rhinorrhea is clear. Right Nostril: No occlusion. Left Nostril: No occlusion. Right Turbinates: Swollen. Not enlarged. Left Turbinates: Swollen. Not enlarged. Right Sinus: No maxillary sinus tenderness or frontal sinus tenderness. Left Sinus: No maxillary sinus tenderness or frontal sinus tenderness.       Mouth/Throat:      Mouth: Mucous membranes are moist.      Pharynx: Oropharynx is clear. Comments: Post nasal driop  Eyes:      General:         Right eye: No discharge. Left eye: No discharge. Extraocular Movements: Extraocular movements intact. Conjunctiva/sclera: Conjunctivae normal.   Cardiovascular:      Rate and Rhythm: Normal rate and regular rhythm. Pulses: Normal pulses. Heart sounds: Normal heart sounds. No murmur heard. Pulmonary:      Effort: Pulmonary effort is normal. No respiratory distress. Breath sounds: Normal breath sounds. No wheezing. Abdominal:      General: Bowel sounds are normal. There is no distension. Palpations: Abdomen is soft. There is no mass. Tenderness: There is no abdominal tenderness. There is no guarding or rebound. Musculoskeletal:         General: No swelling or tenderness. Normal range of motion. Cervical back: Normal range of motion and neck supple. Right lower leg: No edema. Left lower leg: No edema. Skin:     General: Skin is warm. Capillary Refill: Capillary refill takes less than 2 seconds. Coloration: Skin is not jaundiced. Neurological:      General: No focal deficit present. Mental Status: She is alert and oriented to person, place, and time. Mental status is at baseline. Sensory: No sensory deficit. Psychiatric:         Mood and Affect: Mood normal.         Behavior: Behavior normal.         Thought Content: Thought content normal.         Judgment: Judgment normal.         Assessment/Plan:  1. Essential hypertension  - controlled and hemodymically stable  - continue same regimen for HTN meds  - CBC Auto Differential; Future  - Basic Metabolic Panel; Future  - LIPID PANEL; Future    2. Allergic rhinitis, unspecified seasonality, unspecified trigger  - will send zyrtec to start and to increase flonase  - cetirizine (ZYRTEC) 10 MG tablet; Take 1 tablet by mouth daily  Dispense: 90 tablet;  Refill: 1  - fluticasone (FLONASE) 50 MCG/ACT nasal spray; 2 sprays by Nasal route daily SHAKE LIQUID AND USE 2 SPRAYS IN EACH NOSTRIL DAILY  Dispense: 1 each; Refill: 3    3. History of prediabetes  - HEMOGLOBIN A1C; Future    4. Need for influenza vaccination  - INFLUENZA, MDCK QUADV, 2 YRS AND OLDER, IM, PF, PREFILL SYR OR SDV, 0.5ML (FLUCELVAX QUADV, PF)      Follow up:  2 months for HTN and lab worked to f/u with PCP    Patient agrees with the above stated plan. Counseled regarding above diagnosis, including possible risks and complications,  especially if left uncontrolled. Counseled regarding the possible side effects, risks, benefits and alternatives to treatment;patient and/or guardian verbalizes understanding, agrees, feels comfortable with and wishes to proceed with above treatment plan. Call or go to ED immediately if symptoms worsen or persist. Advised patient to call with any new medication issues, and, as applicable, read all Rx info from pharmacy to assure aware of all possible risks and side effects of medicationbefore taking. Patient and/or guardian given opportunity to ask questions/raise concerns. The patient verbalized comfort and understanding ofinstructions. I encourage further reading and education about your health conditions. Information on many health conditions is provided by [facility] [employer]: [email or web address]  Please bring any questions to me at your nextvisit.       Ijeoma Romero MD  Family Medicine PGY-3

## 2021-09-23 NOTE — PATIENT INSTRUCTIONS

## 2021-09-25 ENCOUNTER — HOSPITAL ENCOUNTER (EMERGENCY)
Age: 61
Discharge: HOME | End: 2021-09-25
Payer: COMMERCIAL

## 2021-09-25 VITALS
HEIGHT: 57 IN | RESPIRATION RATE: 16 BRPM | WEIGHT: 167 LBS | OXYGEN SATURATION: 98 % | DIASTOLIC BLOOD PRESSURE: 102 MMHG | HEART RATE: 66 BPM | SYSTOLIC BLOOD PRESSURE: 189 MMHG | TEMPERATURE: 98.9 F | BODY MASS INDEX: 36.03 KG/M2

## 2021-09-25 DIAGNOSIS — J40 BRONCHITIS: Primary | ICD-10-CM

## 2021-09-25 DIAGNOSIS — I10 HYPERTENSION, UNSPECIFIED TYPE: ICD-10-CM

## 2021-09-25 PROCEDURE — 99283 EMERGENCY DEPT VISIT LOW MDM: CPT

## 2021-09-25 PROCEDURE — U0003 INFECTIOUS AGENT DETECTION BY NUCLEIC ACID (DNA OR RNA); SEVERE ACUTE RESPIRATORY SYNDROME CORONAVIRUS 2 (SARS-COV-2) (CORONAVIRUS DISEASE [COVID-19]), AMPLIFIED PROBE TECHNIQUE, MAKING USE OF HIGH THROUGHPUT TECHNOLOGIES AS DESCRIBED BY CMS-2020-01-R: HCPCS

## 2021-09-25 PROCEDURE — U0005 INFEC AGEN DETEC AMPLI PROBE: HCPCS

## 2021-09-25 RX ORDER — GUAIFENESIN 600 MG/1
600 TABLET, EXTENDED RELEASE ORAL 2 TIMES DAILY
Qty: 14 TABLET | Refills: 0 | Status: SHIPPED | OUTPATIENT
Start: 2021-09-25 | End: 2021-10-02

## 2021-09-25 RX ORDER — METHYLPREDNISOLONE 4 MG/1
2 TABLET ORAL DAILY
Qty: 3 TABLET | Refills: 0 | Status: SHIPPED | OUTPATIENT
Start: 2021-09-25 | End: 2021-10-01

## 2021-09-25 ASSESSMENT — PAIN SCALES - GENERAL: PAINLEVEL_OUTOF10: 9

## 2021-09-25 ASSESSMENT — PAIN DESCRIPTION - LOCATION: LOCATION: GENERALIZED

## 2021-09-25 NOTE — ED PROVIDER NOTES
[facility] [place]  Department of Emergency Medicine   ED  Encounter Note  Admit Date/RoomTime: 2021  7:16 PM  ED Room: [place] [address]/    NAME: Zoë Wylie  : 1960  MRN: [id number]     Chief Complaint:  Concern For COVID-19 (symptoms started 4 days ago. Cough, congestion, and SOB that started today.)    History of Present Illness       Tiffany Esposito is a 63 y.o. old female who presents to the emergency department by private vehicle, for cough with clear mucus, nasal congestion, clear rhinorrhea, and body aches, which began 4 day(s) prior to arrival.  Since onset the symptoms have been persistent and mild-moderate in severity. The symptoms are associated with no additional symptoms as it relates to today's visit. There has been no headache, neck pain, fever, chills, chest pain, shortness of breath, abdominal pain, nausea, vomiting, diarrhea, constipation, dysuria, rash, edema, syncope, or palpitations. ROS   Pertinent positives and negatives are stated within HPI, all other systems reviewed and are negative. Past Medical History:  has a past medical history of Anxiety and depression, Baker's cyst, ruptured, CAD (coronary artery disease), Chronic back pain, Chronic myocarditis ([address] Diverticulosis of sigmoid colon, Duodenal ulcer, Facet arthropathy, Fibrocystic breast changes, Fracture of metatarsal bone, Genital warts, GERD (gastroesophageal reflux disease), Heart attack ([address] History of blood transfusion, History of cervical dysplasia, Hyperlipidemia, Hypertension, IGT (impaired glucose tolerance), Myocarditis ([address] Obesity, OLIVIA (obstructive sleep apnea), Osteoarthritis of multiple joints, Osteomyelitis ([address] Primary insomnia, Protruded cervical disc/DDD with neural foraminal stenosis, Pulmonary nodule, Sacroiliitis ([address] Shingles, Tobacco abuse, and Valvular heart disease.     Surgical History:  has a past surgical history that includes shoulder surgery (Right); knee surgery (Bilateral); Foot surgery; Upper gastrointestinal endoscopy (8/20/12); Nerve Block (07/16/14); Nerve Block (Right, 11/3/14); Nerve Block (Right, 11 19 14); other surgical history (Right, 2 9 15); Nerve Block (Bilateral, 4/22/2015); Nerve Block (Bilateral, 5/6/15); Nerve Block (Bilateral, 05/13/15); Nerve Block (Right, 6/10/15); Tonsillectomy; Total knee arthroplasty (Left, 7/22/14 at 48years old); Colonoscopy (8/20/12); Rotator cuff repair (1999 at 39 years); joint replacement (Right); Bunionectomy (great toe); Hammer toe surgery; Tonsillectomy; Nerve Block; Nerve Block (Right, 1/18/2016); Nerve Block (Right, 1/25/16); Nerve Block (2/17/15); other surgical history (Right, 3/9/16); Nerve Block (Left, 08/15/2016); Nerve Block (09/07/2016); other surgical history (Right, 11/14/2016); Foot Debridement (11/2016); Nerve Block (Right, 12/07/2016); other surgical history (Left, 12/19/2016); Nerve Block (Right, 04/19/2017); Nerve Block (Right, 09/11/2017); Cardiac catheterization (09/24/2018); and Hysterectomy, total abdominal (2001). Social History:  reports that she quit smoking about 5 months ago. Her smoking use included cigarettes. She has a 20.00 pack-year smoking history. She has never used smokeless tobacco. She reports that she does not drink alcohol and does not use drugs. Family History: family history includes Arthritis in her mother; Depression in her daughter; High Blood Pressure in her brother, brother, father, and mother; No Known Problems in her son; Osteoarthritis in her father; Other in her father; Rheum Arthritis in her mother. Allergies: Patient has no known allergies. Physical Exam   Oxygen Saturation Interpretation: Normal on room air analysis. ED Triage Vitals [09/25/21 1855]   BP Temp Temp src Pulse Resp SpO2 Height Weight   -- -- -- 73 -- 94 % -- --         · Constitutional:  Alert, development consistent with age.   · Ears:  External Ears: Bilateral normal. TM's & External Canals: normal appearance. · Nose:   There is no discharge, swelling or lesions noted. · Sinuses: no Bilateral maxillary sinus tenderness. no Bilateral frontal sinus tenderness. · Mouth:  normal tongue and buccal mucosa. · Throat: no erythema or exudates noted. Teeth and gums normal..  Airway Patent. · Neck:  Supple. There is no  preauricular, submental, parotid, anterior cervical and posterior cervical node tenderness. · Respiratory:  Lung sounds: Trace inspiratory wheezes in the upper fields, but otherwise clear. No tachypnea or conversational dyspnea. · CV:  Regular rate and rhythm, normal heart sounds, without pathological murmurs, ectopy, gallops, or rubs. · GI:  Abdomen Soft, nontender, good bowel sounds. No firm or pulsatile mass. · Integument:  Normal turgor. Warm, dry, without visible rash. · Neurological:  Oriented. Motor functions intact. Lab / Imaging Results   (All laboratory and radiology results have been personally reviewed by myself)  Labs:  No results found for this visit on 09/25/21. Imaging: All Radiology results interpreted by Radiologist unless otherwise noted. No orders to display       ED Course / Medical Decision Making   Medications - No data to display     Medical Decision Making:   Patient presented with bronchitis-like symptoms and she is a cigarette smoker. She appeared well, nontoxic, and comfortable. She was in no distress. She is fully vaccinated for COVID-19, but was concerned of possible COVID-19 exposure. A COVID-19 screening was sent. No other diagnostics are indicated at this time. She is prescribed a Medrol Dosepak and guaifenesin. She is instructed on smoking sensation. She is appropriate for discharge and outpatient follow-up. She is given general information on COVID-19 and quarantine until she gets her COVID-19 results. Patient verbalizes understanding.   She is instructed to return to the emergency department any new or worsening symptoms. Assessment     1. Bronchitis    2. Hypertension, unspecified type      Plan   Discharged homeDischarged home. Patient condition is good    New Medications     New Prescriptions    GUAIFENESIN (MUCINEX) 600 MG EXTENDED RELEASE TABLET    Take 1 tablet by mouth 2 times daily for 7 days    METHYLPREDNISOLONE (MEDROL) 4 MG TABLET    Take 0.5 tablets by mouth daily for 6 days     Electronically signed by MIKALA Pascual CNP   DD: 9/25/21  **This report was transcribed using voice recognition software. Every effort was made to ensure accuracy; however, inadvertent computerized transcription errors may be present.   END OF ED PROVIDER NOTE         MIKALA Johnson CNP  09/25/21 1162

## 2021-09-26 LAB — SARS-COV-2, PCR: NOT DETECTED

## 2021-09-27 ENCOUNTER — TELEPHONE (OUTPATIENT)
Dept: FAMILY MEDICINE CLINIC | Age: 61
End: 2021-09-27

## 2021-10-04 DIAGNOSIS — J30.9 ALLERGIC RHINITIS, UNSPECIFIED SEASONALITY, UNSPECIFIED TRIGGER: ICD-10-CM

## 2021-10-05 RX ORDER — FLUTICASONE PROPIONATE 50 MCG
SPRAY, SUSPENSION (ML) NASAL
Qty: 48 G | Refills: 0 | Status: SHIPPED
Start: 2021-10-05 | End: 2022-01-11

## 2021-10-18 DIAGNOSIS — I10 ESSENTIAL HYPERTENSION: ICD-10-CM

## 2021-10-19 RX ORDER — CHLORTHALIDONE 25 MG/1
25 TABLET ORAL DAILY
Qty: 90 TABLET | Refills: 0 | Status: SHIPPED
Start: 2021-10-19 | End: 2021-10-20 | Stop reason: SDUPTHER

## 2021-10-20 DIAGNOSIS — I25.10 CAD IN NATIVE ARTERY: Chronic | ICD-10-CM

## 2021-10-20 DIAGNOSIS — I10 ESSENTIAL HYPERTENSION: ICD-10-CM

## 2021-10-20 RX ORDER — LOSARTAN POTASSIUM 50 MG/1
50 TABLET ORAL DAILY
Qty: 90 TABLET | Refills: 0 | Status: SHIPPED
Start: 2021-10-20 | End: 2021-12-28 | Stop reason: SDUPTHER

## 2021-10-20 RX ORDER — CHLORTHALIDONE 25 MG/1
25 TABLET ORAL DAILY
Qty: 90 TABLET | Refills: 0 | Status: SHIPPED
Start: 2021-10-20 | End: 2021-12-28 | Stop reason: SDUPTHER

## 2021-11-22 DIAGNOSIS — R19.7 DIARRHEA, UNSPECIFIED TYPE: ICD-10-CM

## 2021-11-23 RX ORDER — DICYCLOMINE HYDROCHLORIDE 10 MG/1
CAPSULE ORAL
Qty: 120 CAPSULE | Refills: 2 | Status: SHIPPED
Start: 2021-11-23 | End: 2022-02-08

## 2021-12-04 DIAGNOSIS — I10 ESSENTIAL HYPERTENSION: ICD-10-CM

## 2021-12-04 DIAGNOSIS — I25.10 CAD IN NATIVE ARTERY: Chronic | ICD-10-CM

## 2021-12-05 DIAGNOSIS — K21.9 GASTROESOPHAGEAL REFLUX DISEASE, UNSPECIFIED WHETHER ESOPHAGITIS PRESENT: Chronic | ICD-10-CM

## 2021-12-07 RX ORDER — METOPROLOL SUCCINATE 100 MG/1
TABLET, EXTENDED RELEASE ORAL
Qty: 90 TABLET | Refills: 0 | Status: SHIPPED
Start: 2021-12-07 | End: 2021-12-28 | Stop reason: SDUPTHER

## 2021-12-07 RX ORDER — OMEPRAZOLE 40 MG/1
40 CAPSULE, DELAYED RELEASE ORAL DAILY
Qty: 90 CAPSULE | Refills: 0 | Status: SHIPPED
Start: 2021-12-07 | End: 2022-03-08

## 2021-12-14 ENCOUNTER — APPOINTMENT (OUTPATIENT)
Dept: GENERAL RADIOLOGY | Age: 61
End: 2021-12-14
Payer: COMMERCIAL

## 2021-12-14 LAB
BASOPHILS ABSOLUTE: 0.11 E9/L (ref 0–0.2)
BASOPHILS RELATIVE PERCENT: 0.8 % (ref 0–2)
EOSINOPHILS ABSOLUTE: 0.2 E9/L (ref 0.05–0.5)
EOSINOPHILS RELATIVE PERCENT: 1.5 % (ref 0–6)
HCT VFR BLD CALC: 46.1 % (ref 34–48)
HEMOGLOBIN: 15.2 G/DL (ref 11.5–15.5)
IMMATURE GRANULOCYTES #: 0.04 E9/L
IMMATURE GRANULOCYTES %: 0.3 % (ref 0–5)
LYMPHOCYTES ABSOLUTE: 3.97 E9/L (ref 1.5–4)
LYMPHOCYTES RELATIVE PERCENT: 30.3 % (ref 20–42)
MCH RBC QN AUTO: 31.2 PG (ref 26–35)
MCHC RBC AUTO-ENTMCNC: 33 % (ref 32–34.5)
MCV RBC AUTO: 94.7 FL (ref 80–99.9)
MONOCYTES ABSOLUTE: 1.19 E9/L (ref 0.1–0.95)
MONOCYTES RELATIVE PERCENT: 9.1 % (ref 2–12)
NEUTROPHILS ABSOLUTE: 7.59 E9/L (ref 1.8–7.3)
NEUTROPHILS RELATIVE PERCENT: 58 % (ref 43–80)
PDW BLD-RTO: 14.2 FL (ref 11.5–15)
PLATELET # BLD: 361 E9/L (ref 130–450)
PMV BLD AUTO: 10.8 FL (ref 7–12)
RBC # BLD: 4.87 E12/L (ref 3.5–5.5)
WBC # BLD: 13.1 E9/L (ref 4.5–11.5)

## 2021-12-14 PROCEDURE — 71045 X-RAY EXAM CHEST 1 VIEW: CPT

## 2021-12-14 PROCEDURE — 84484 ASSAY OF TROPONIN QUANT: CPT

## 2021-12-14 PROCEDURE — 83735 ASSAY OF MAGNESIUM: CPT

## 2021-12-14 PROCEDURE — 93005 ELECTROCARDIOGRAM TRACING: CPT | Performed by: NURSE PRACTITIONER

## 2021-12-14 PROCEDURE — 80053 COMPREHEN METABOLIC PANEL: CPT

## 2021-12-14 PROCEDURE — 85025 COMPLETE CBC W/AUTO DIFF WBC: CPT

## 2021-12-14 PROCEDURE — 6370000000 HC RX 637 (ALT 250 FOR IP): Performed by: NURSE PRACTITIONER

## 2021-12-14 PROCEDURE — 99284 EMERGENCY DEPT VISIT MOD MDM: CPT

## 2021-12-14 RX ORDER — ASPIRIN 81 MG/1
324 TABLET, CHEWABLE ORAL ONCE
Status: COMPLETED | OUTPATIENT
Start: 2021-12-14 | End: 2021-12-14

## 2021-12-14 RX ADMIN — ASPIRIN 324 MG: 81 TABLET, CHEWABLE ORAL at 23:05

## 2021-12-15 ENCOUNTER — HOSPITAL ENCOUNTER (EMERGENCY)
Age: 61
Discharge: HOME | End: 2021-12-15
Attending: EMERGENCY MEDICINE
Payer: COMMERCIAL

## 2021-12-15 VITALS
SYSTOLIC BLOOD PRESSURE: 154 MMHG | OXYGEN SATURATION: 99 % | RESPIRATION RATE: 16 BRPM | TEMPERATURE: 98.4 F | HEART RATE: 46 BPM | DIASTOLIC BLOOD PRESSURE: 78 MMHG

## 2021-12-15 DIAGNOSIS — R00.2 PALPITATIONS: Primary | ICD-10-CM

## 2021-12-15 LAB
ALBUMIN SERPL-MCNC: 4 G/DL (ref 3.5–5.2)
ALP BLD-CCNC: 100 U/L (ref 35–104)
ALT SERPL-CCNC: 16 U/L (ref 0–32)
ANION GAP SERPL CALCULATED.3IONS-SCNC: 12 MMOL/L (ref 7–16)
AST SERPL-CCNC: 14 U/L (ref 0–31)
BILIRUB SERPL-MCNC: <0.2 MG/DL (ref 0–1.2)
BUN BLDV-MCNC: 13 MG/DL (ref 6–20)
CALCIUM SERPL-MCNC: 9 MG/DL (ref 8.6–10.2)
CHLORIDE BLD-SCNC: 100 MMOL/L (ref 98–107)
CO2: 27 MMOL/L (ref 22–29)
CREAT SERPL-MCNC: 0.8 MG/DL (ref 0.5–1)
EKG ATRIAL RATE: 55 BPM
EKG P AXIS: 72 DEGREES
EKG P-R INTERVAL: 120 MS
EKG Q-T INTERVAL: 480 MS
EKG QRS DURATION: 100 MS
EKG QTC CALCULATION (BAZETT): 459 MS
EKG R AXIS: 84 DEGREES
EKG T AXIS: 93 DEGREES
EKG VENTRICULAR RATE: 55 BPM
GFR AFRICAN AMERICAN: >60
GFR NON-AFRICAN AMERICAN: >60 ML/MIN/1.73
GLUCOSE BLD-MCNC: 96 MG/DL (ref 74–99)
MAGNESIUM: 1.8 MG/DL (ref 1.6–2.6)
POTASSIUM SERPL-SCNC: 3.3 MMOL/L (ref 3.5–5)
SODIUM BLD-SCNC: 139 MMOL/L (ref 132–146)
TOTAL PROTEIN: 7.2 G/DL (ref 6.4–8.3)
TROPONIN, HIGH SENSITIVITY: 7 NG/L (ref 0–9)
TROPONIN, HIGH SENSITIVITY: <6 NG/L (ref 0–9)

## 2021-12-15 PROCEDURE — 84484 ASSAY OF TROPONIN QUANT: CPT

## 2021-12-15 PROCEDURE — 36415 COLL VENOUS BLD VENIPUNCTURE: CPT

## 2021-12-15 PROCEDURE — 6370000000 HC RX 637 (ALT 250 FOR IP): Performed by: STUDENT IN AN ORGANIZED HEALTH CARE EDUCATION/TRAINING PROGRAM

## 2021-12-15 PROCEDURE — 93010 ELECTROCARDIOGRAM REPORT: CPT | Performed by: INTERNAL MEDICINE

## 2021-12-15 RX ORDER — POTASSIUM CHLORIDE 20 MEQ/1
40 TABLET, EXTENDED RELEASE ORAL ONCE
Status: COMPLETED | OUTPATIENT
Start: 2021-12-15 | End: 2021-12-15

## 2021-12-15 RX ADMIN — POTASSIUM CHLORIDE 40 MEQ: 20 TABLET, EXTENDED RELEASE ORAL at 06:05

## 2021-12-15 ASSESSMENT — ENCOUNTER SYMPTOMS
EYE PAIN: 0
COUGH: 0
DIARRHEA: 0
WHEEZING: 0
BACK PAIN: 0
EYE REDNESS: 0
NAUSEA: 0
VOMITING: 0
EYE DISCHARGE: 0
ABDOMINAL PAIN: 0
ABDOMINAL DISTENTION: 0
SINUS PRESSURE: 0
SHORTNESS OF BREATH: 0
SORE THROAT: 0

## 2021-12-15 NOTE — ED PROVIDER NOTES
ATTENDING PROVIDER ATTESTATION:     Guerline Sebastian presented to the emergency department for evaluation of Palpitations (feels like heart is fluttering, ongoing since yesterday. comes and goes )   and was initially evaluated by the Medical Resident. See Original ED Note for H&P and ED course above. I have reviewed and discussed the case, including pertinent history (medical, surgical, family and social) and exam findings with the Medical Resident assigned to Guerline Sebastian. I have personally performed and/or participated in the history, exam, medical decision making, and procedures and agree with all pertinent clinical information and any additional changes or corrections are noted below in my assessment and plan. I have discussed this patient in detail with the resident, and provided the instruction and education,       I have reviewed my findings and recommendations with the assigned Medical Resident, Guerline Sebastian and members of family present at the time of disposition. I have performed a history and physical examination of this patient and directly supervised the resident caring for this patient      History of Present Illness:    Presents to the ED for palpitations, beginning yesterday. The complaint has been intermittent, moderate in severity, and worsened by nothing. Patient reports palpitations began yesterday. She reports that at that time she felt her heart was racing or fluttering. She reports that the palpitations made her feel uncomfortable in her chest.  She said it really was not chest pain it was more of just heart beating weird and irregular. No shortness of breath. No syncope. No abdominal pain. No back pain. She reports she has had similar symptoms in the past but no visible masses. She denies exertional pain. She really denies chest pain. Last echo from last year with normal ejection fraction.         Review of Systems:   A complete review of systems was performed and pertinent positives and negatives are stated within HPI, all other systems reviewed and are negative.    --------------------------------------------- PAST HISTORY ---------------------------------------------  Past Medical History:  has a past medical history of Anxiety and depression, Baker's cyst, ruptured, CAD (coronary artery disease), Chronic back pain, Chronic myocarditis ([address] Diverticulosis of sigmoid colon, Duodenal ulcer, Facet arthropathy, Fibrocystic breast changes, Fracture of metatarsal bone, Genital warts, GERD (gastroesophageal reflux disease), Heart attack ([address] History of blood transfusion, History of cervical dysplasia, Hyperlipidemia, Hypertension, IGT (impaired glucose tolerance), Myocarditis ([address] Obesity, OLIVIA (obstructive sleep apnea), Osteoarthritis of multiple joints, Osteomyelitis ([address] Primary insomnia, Protruded cervical disc/DDD with neural foraminal stenosis, Pulmonary nodule, Sacroiliitis ([address] Shingles, Tobacco abuse, and Valvular heart disease. Past Surgical History:  has a past surgical history that includes shoulder surgery (Right); knee surgery (Bilateral); Foot surgery; Upper gastrointestinal endoscopy (8/20/12); Nerve Block (07/16/14); Nerve Block (Right, 11/3/14); Nerve Block (Right, 11 19 14); other surgical history (Right, 2 9 15); Nerve Block (Bilateral, 4/22/2015); Nerve Block (Bilateral, 5/6/15); Nerve Block (Bilateral, 05/13/15); Nerve Block (Right, 6/10/15); Tonsillectomy; Total knee arthroplasty (Left, 7/22/14 at 48years old); Colonoscopy (8/20/12); Rotator cuff repair (1999 at 39 years); joint replacement (Right); Bunionectomy (great toe); Hammer toe surgery; Tonsillectomy; Nerve Block; Nerve Block (Right, 1/18/2016); Nerve Block (Right, 1/25/16); Nerve Block (2/17/15); other surgical history (Right, 3/9/16); Nerve Block (Left, 08/15/2016); Nerve Block (09/07/2016); other surgical history (Right, 11/14/2016); Foot Debridement (11/2016);  Nerve Block (Right, 12/07/2016); other surgical history (Left, 12/19/2016); Nerve Block (Right, 04/19/2017); Nerve Block (Right, 09/11/2017); Cardiac catheterization (09/24/2018); and Hysterectomy, total abdominal (2001). Social History:  reports that she quit smoking about 8 months ago. Her smoking use included cigarettes. She has a 20.00 pack-year smoking history. She has never used smokeless tobacco. She reports that she does not drink alcohol and does not use drugs. Family History: family history includes Arthritis in her mother; Depression in her daughter; High Blood Pressure in her brother, brother, father, and mother; No Known Problems in her son; Osteoarthritis in her father; Other in her father; Rheum Arthritis in her mother. Unless otherwise noted, family history is non contributory    The patients home medications have been reviewed. Allergies: Patient has no known allergies. Physical Exam:  Constitutional/General: Alert and oriented x3  Head: Normocephalic and atraumatic  Eyes: PERRL, EOMI, sclera non icteric  ENT: Oropharynx clear, handling secretions  Neck: Supple, full ROM, no stridor, no meningeal signs  Respiratory: Lungs clear to auscultation bilaterally, no wheezes, rales, or rhonchi. Not in respiratory distress  Cardiovascular:  Regular rate. Regular rhythm. No murmurs, no gallops, no rubs. 2+ distal pulses. Equal extremity pulses. GI:  Abdomen Soft, Non tender, Non distended. No rebound, guarding, or rigidity. No pulsatile masses. Musculoskeletal: Moves all extremities x 4. Warm and well perfused,  no clubbing, no cyanosis, no edema. Palpable peripheral pulses  Integument: skin warm and dry. No rashes.    Neurologic: GCS 15, no focal deficits  Psychiatric: Normal Affect      I directly supervised any procedures performed by the resident and was present for the procedure including all critical portions of the procedure        I, Dr. Fabian Suárez, am the primary provider of record    My Medical Decision

## 2021-12-15 NOTE — ED NOTES
Department of Emergency Medicine  FIRST PROVIDER TRIAGE NOTE             Independent MLP           12/14/21  10:43 PM EST    Date of Encounter: 12/14/21   MRN: [id number]      HPI: Rian Pearl is a 63 y.o. female who presents to the ED for Palpitations (feels like heart is fluttering, ongoing since yesterday. comes and goes )   Patient with History of MI ,, reports 6 months ago and 2 years ago,  No cardiac stents    ROS: Negative for abd pain. PE: Gen Appearance/Constitutional: alert  CV: Patient feeling palpiatins     Initial Plan of Care: All treatment areas with department are currently occupied. Plan to order/Initiate the following while awaiting opening in ED: labs, EKG and imaging studies.     Initial Plan of Care: Initiate Treatment-Testing, Proceed toTreatment Area When Bed Available for ED Attending/MLP to Continue Care    Electronically signed by MIKALA Cortez CNP   DD: 12/14/21         MIKALA Cortez CNP  12/14/21 [address] MIKALA Garcia CNP  12/14/21 0504

## 2021-12-15 NOTE — ED PROVIDER NOTES
distension. Palpations: Abdomen is soft. There is no mass. Tenderness: There is no abdominal tenderness. Musculoskeletal:         General: Normal range of motion. Cervical back: Normal range of motion and neck supple. Skin:     General: Skin is warm and dry. Neurological:      General: No focal deficit present. Mental Status: She is alert and oriented to person, place, and time. Cranial Nerves: No cranial nerve deficit. Procedures     MDM     ED Course as of 12/15/21 1432   Wed Dec 15, 2021   9570 EKG: This EKG is signed by emergency department physician. Rate: 55  Rhythm: Sinus  Interpretation: Incomplete right bundle, nonspecific T wave abnormalities, ST flattening in the anterior leads with T wave inversions in the lateral leads  Comparison: stable as compared to patient's most recent EKG      [JG]   0706 Delta troponin is 1 after greater than 3 hours, EKG was unremarkable, potassium is low, was repleted. [JG]   8958 Mildly elevated leukocytosis with no significant left shift, patient is afebrile, no complaints of systemic illness, will discharge patient. [JG]   3880 Patient presented emergency department for palpitations, felt like her heart was fluttering had some chest pain on and off last couple days, delta troponin unremarkable, EKG was unchanged compared to previous, potassium is low at 3.3, was repleted, other laboratory work relatively unremarkable, she was discharged home, return precautions given. [JG]      ED Course User Index  [JG] Carlos Gracia MD      Patient presented emergency department for palpitations, felt like her heart was fluttering had some chest pain on and off last couple days, delta troponin unremarkable, EKG was unchanged compared to previous, potassium is low at 3.3, was repleted, other laboratory work relatively unremarkable, she was discharged home, return precautions given.       ED Course as of 12/15/21 1432   Wed Dec 15, 2021   0578 EKG:  This EKG is signed by emergency department physician. Rate: 55  Rhythm: Sinus  Interpretation: Incomplete right bundle, nonspecific T wave abnormalities, ST flattening in the anterior leads with T wave inversions in the lateral leads  Comparison: stable as compared to patient's most recent EKG      [JG]   0706 Delta troponin is 1 after greater than 3 hours, EKG was unremarkable, potassium is low, was repleted. [JG]   1977 Mildly elevated leukocytosis with no significant left shift, patient is afebrile, no complaints of systemic illness, will discharge patient. [JG]   5192 Patient presented emergency department for palpitations, felt like her heart was fluttering had some chest pain on and off last couple days, delta troponin unremarkable, EKG was unchanged compared to previous, potassium is low at 3.3, was repleted, other laboratory work relatively unremarkable, she was discharged home, return precautions given. [JG]      ED Course User Index  [JG] Bart Myers MD       --------------------------------------------- PAST HISTORY ---------------------------------------------  Past Medical History:  has a past medical history of Anxiety and depression, Baker's cyst, ruptured, CAD (coronary artery disease), Chronic back pain, Chronic myocarditis ([address] Diverticulosis of sigmoid colon, Duodenal ulcer, Facet arthropathy, Fibrocystic breast changes, Fracture of metatarsal bone, Genital warts, GERD (gastroesophageal reflux disease), Heart attack ([address] History of blood transfusion, History of cervical dysplasia, Hyperlipidemia, Hypertension, IGT (impaired glucose tolerance), Myocarditis ([address] Obesity, OLIVIA (obstructive sleep apnea), Osteoarthritis of multiple joints, Osteomyelitis ([address] Primary insomnia, Protruded cervical disc/DDD with neural foraminal stenosis, Pulmonary nodule, Sacroiliitis ([address] Shingles, Tobacco abuse, and Valvular heart disease.     Past Surgical History:  has a past surgical history that includes shoulder surgery (Right); knee surgery (Bilateral); Foot surgery; Upper gastrointestinal endoscopy (8/20/12); Nerve Block (07/16/14); Nerve Block (Right, 11/3/14); Nerve Block (Right, 11 19 14); other surgical history (Right, 2 9 15); Nerve Block (Bilateral, 4/22/2015); Nerve Block (Bilateral, 5/6/15); Nerve Block (Bilateral, 05/13/15); Nerve Block (Right, 6/10/15); Tonsillectomy; Total knee arthroplasty (Left, 7/22/14 at 48years old); Colonoscopy (8/20/12); Rotator cuff repair (1999 at 39 years); joint replacement (Right); Bunionectomy (great toe); Hammer toe surgery; Tonsillectomy; Nerve Block; Nerve Block (Right, 1/18/2016); Nerve Block (Right, 1/25/16); Nerve Block (2/17/15); other surgical history (Right, 3/9/16); Nerve Block (Left, 08/15/2016); Nerve Block (09/07/2016); other surgical history (Right, 11/14/2016); Foot Debridement (11/2016); Nerve Block (Right, 12/07/2016); other surgical history (Left, 12/19/2016); Nerve Block (Right, 04/19/2017); Nerve Block (Right, 09/11/2017); Cardiac catheterization (09/24/2018); and Hysterectomy, total abdominal (2001). Social History:  reports that she quit smoking about 8 months ago. Her smoking use included cigarettes. She has a 20.00 pack-year smoking history. She has never used smokeless tobacco. She reports that she does not drink alcohol and does not use drugs. Family History: family history includes Arthritis in her mother; Depression in her daughter; High Blood Pressure in her brother, brother, father, and mother; No Known Problems in her son; Osteoarthritis in her father; Other in her father; Rheum Arthritis in her mother. The patients home medications have been reviewed. Allergies: Patient has no known allergies.     -------------------------------------------------- RESULTS -------------------------------------------------  Labs:  Results for orders placed or performed during the hospital encounter of 12/15/21   Troponin   Result Value Ref Range    Troponin, High Sensitivity <6 0 - 9 ng/L   CBC Auto Differential   Result Value Ref Range    WBC 13.1 (H) 4.5 - 11.5 E9/L    RBC 4.87 3.50 - 5.50 E12/L    Hemoglobin 15.2 11.5 - 15.5 g/dL    Hematocrit 46.1 34.0 - 48.0 %    MCV 94.7 80.0 - 99.9 fL    MCH 31.2 26.0 - 35.0 pg    MCHC 33.0 32.0 - 34.5 %    RDW 14.2 11.5 - 15.0 fL    Platelets 302 173 - 438 E9/L    MPV 10.8 7.0 - 12.0 fL    Neutrophils % 58.0 43.0 - 80.0 %    Immature Granulocytes % 0.3 0.0 - 5.0 %    Lymphocytes % 30.3 20.0 - 42.0 %    Monocytes % 9.1 2.0 - 12.0 %    Eosinophils % 1.5 0.0 - 6.0 %    Basophils % 0.8 0.0 - 2.0 %    Neutrophils Absolute 7.59 (H) 1.80 - 7.30 E9/L    Immature Granulocytes # 0.04 E9/L    Lymphocytes Absolute 3.97 1.50 - 4.00 E9/L    Monocytes Absolute 1.19 (H) 0.10 - 0.95 E9/L    Eosinophils Absolute 0.20 0.05 - 0.50 E9/L    Basophils Absolute 0.11 0.00 - 0.20 E9/L   Comprehensive Metabolic Panel   Result Value Ref Range    Sodium 139 132 - 146 mmol/L    Potassium 3.3 (L) 3.5 - 5.0 mmol/L    Chloride 100 98 - 107 mmol/L    CO2 27 22 - 29 mmol/L    Anion Gap 12 7 - 16 mmol/L    Glucose 96 74 - 99 mg/dL    BUN 13 6 - 20 mg/dL    CREATININE 0.8 0.5 - 1.0 mg/dL    GFR Non-African American >60 >=60 mL/min/1.73    GFR African American >60     Calcium 9.0 8.6 - 10.2 mg/dL    Total Protein 7.2 6.4 - 8.3 g/dL    Albumin 4.0 3.5 - 5.2 g/dL    Total Bilirubin <0.2 0.0 - 1.2 mg/dL    Alkaline Phosphatase 100 35 - 104 U/L    ALT 16 0 - 32 U/L    AST 14 0 - 31 U/L   Magnesium   Result Value Ref Range    Magnesium 1.8 1.6 - 2.6 mg/dL   Troponin   Result Value Ref Range    Troponin, High Sensitivity 7 0 - 9 ng/L   EKG 12 Lead   Result Value Ref Range    Ventricular Rate 55 BPM    Atrial Rate 55 BPM    P-R Interval 120 ms    QRS Duration 100 ms    Q-T Interval 480 ms    QTc Calculation (Bazett) 459 ms    P Axis 72 degrees    R Axis 84 degrees    T Axis 93 degrees       Radiology:  XR CHEST PORTABLE   Final Result   No acute process. ------------------------- NURSING NOTES AND VITALS REVIEWED ---------------------------  Date / Time Roomed:  12/15/2021  5:58 AM  ED Bed Assignment:  JONATHAN MATHEWS    The nursing notes within the ED encounter and vital signs as below have been reviewed. BP (!) 154/78   Pulse (!) 46   Temp 98.4 °F (36.9 °C) (Oral)   Resp 16   SpO2 99%   Oxygen Saturation Interpretation: Normal      ------------------------------------------ PROGRESS NOTES ------------------------------------------  2:32 PM EST  I have spoken with the patient and discussed todays results, in addition to providing specific details for the plan of care and counseling regarding the diagnosis and prognosis. Their questions are answered at this time and they are agreeable with the plan. I discussed at length with them reasons for immediate return here for re evaluation. They will followup with their primary care physician by calling their office tomorrow. --------------------------------- ADDITIONAL PROVIDER NOTES ---------------------------------  At this time the patient is without objective evidence of an acute process requiring hospitalization or inpatient management. They have remained hemodynamically stable throughout their entire ED visit and are stable for discharge with outpatient follow-up. The plan has been discussed in detail and they are aware of the specific conditions for emergent return, as well as the importance of follow-up. Discharge Medication List as of 12/15/2021  7:16 AM          Diagnosis:  1. Palpitations        Disposition:  Patient's disposition: Discharge to home  Patient's condition is stable.            Alexandria Grant MD  Resident  12/15/21 8798

## 2021-12-16 ENCOUNTER — OFFICE VISIT (OUTPATIENT)
Dept: FAMILY MEDICINE CLINIC | Age: 61
End: 2021-12-16
Payer: COMMERCIAL

## 2021-12-16 VITALS
TEMPERATURE: 97.8 F | OXYGEN SATURATION: 97 % | BODY MASS INDEX: 35.17 KG/M2 | SYSTOLIC BLOOD PRESSURE: 129 MMHG | WEIGHT: 163 LBS | RESPIRATION RATE: 20 BRPM | HEART RATE: 66 BPM | HEIGHT: 57 IN | DIASTOLIC BLOOD PRESSURE: 89 MMHG

## 2021-12-16 DIAGNOSIS — R53.83 OTHER FATIGUE: ICD-10-CM

## 2021-12-16 DIAGNOSIS — R00.2 PALPITATION: ICD-10-CM

## 2021-12-16 DIAGNOSIS — R53.83 OTHER FATIGUE: Primary | ICD-10-CM

## 2021-12-16 LAB
BASOPHILS ABSOLUTE: 0.1 E9/L (ref 0–0.2)
BASOPHILS RELATIVE PERCENT: 0.8 % (ref 0–2)
EOSINOPHILS ABSOLUTE: 0.16 E9/L (ref 0.05–0.5)
EOSINOPHILS RELATIVE PERCENT: 1.2 % (ref 0–6)
HCT VFR BLD CALC: 47.6 % (ref 34–48)
HEMOGLOBIN: 15.1 G/DL (ref 11.5–15.5)
IMMATURE GRANULOCYTES #: 0.04 E9/L
IMMATURE GRANULOCYTES %: 0.3 % (ref 0–5)
LYMPHOCYTES ABSOLUTE: 2.87 E9/L (ref 1.5–4)
LYMPHOCYTES RELATIVE PERCENT: 21.7 % (ref 20–42)
MCH RBC QN AUTO: 30.4 PG (ref 26–35)
MCHC RBC AUTO-ENTMCNC: 31.7 % (ref 32–34.5)
MCV RBC AUTO: 95.8 FL (ref 80–99.9)
MONOCYTES ABSOLUTE: 0.91 E9/L (ref 0.1–0.95)
MONOCYTES RELATIVE PERCENT: 6.9 % (ref 2–12)
NEUTROPHILS ABSOLUTE: 9.12 E9/L (ref 1.8–7.3)
NEUTROPHILS RELATIVE PERCENT: 69.1 % (ref 43–80)
PDW BLD-RTO: 14.3 FL (ref 11.5–15)
PLATELET # BLD: 337 E9/L (ref 130–450)
PMV BLD AUTO: 11.1 FL (ref 7–12)
RBC # BLD: 4.97 E12/L (ref 3.5–5.5)
WBC # BLD: 13.2 E9/L (ref 4.5–11.5)

## 2021-12-16 PROCEDURE — 99213 OFFICE O/P EST LOW 20 MIN: CPT | Performed by: FAMILY MEDICINE

## 2021-12-16 SDOH — ECONOMIC STABILITY: FOOD INSECURITY: WITHIN THE PAST 12 MONTHS, THE FOOD YOU BOUGHT JUST DIDN'T LAST AND YOU DIDN'T HAVE MONEY TO GET MORE.: NEVER TRUE

## 2021-12-16 SDOH — ECONOMIC STABILITY: FOOD INSECURITY: WITHIN THE PAST 12 MONTHS, YOU WORRIED THAT YOUR FOOD WOULD RUN OUT BEFORE YOU GOT MONEY TO BUY MORE.: NEVER TRUE

## 2021-12-16 ASSESSMENT — SOCIAL DETERMINANTS OF HEALTH (SDOH): HOW HARD IS IT FOR YOU TO PAY FOR THE VERY BASICS LIKE FOOD, HOUSING, MEDICAL CARE, AND HEATING?: NOT HARD AT ALL

## 2021-12-16 ASSESSMENT — ENCOUNTER SYMPTOMS
ABDOMINAL PAIN: 0
COUGH: 1
NAUSEA: 0
SHORTNESS OF BREATH: 1
CONSTIPATION: 0
DIARRHEA: 0
VOMITING: 0

## 2021-12-16 NOTE — PROGRESS NOTES
12/16/21    Aman Beckwith is a 63 y.o. female here for:    HPI:    Fatigue and palpitations  Fatigue, weakness, and palpitations started 4 days ago  Palpitations occur every day which make her weaker  Has seen heart doctor in the past   Also having headaches and paleness per daughter   Sleeping all day the last 4 days  Feels cold and has needed increased heat in the home  Some cough but is a smoker; so this is normal for her   Slight SOB, breathing louder per daughter   GI bug prior to feeling these symptoms       BP Readings from Last 3 Encounters:   12/16/21 129/89   12/15/21 (!) 154/78   09/25/21 (!) 189/102     Current Outpatient Medications   Medication Sig Dispense Refill    metoprolol succinate (TOPROL XL) 100 MG extended release tablet TAKE 1 TABLET BY MOUTH EVERY DAY 90 tablet 0    omeprazole (PRILOSEC) 40 MG delayed release capsule TAKE 1 CAPSULE BY MOUTH DAILY 90 capsule 0    dicyclomine (BENTYL) 10 MG capsule TAKE 1 CAPSULE BY MOUTH FOUR TIMES A DAY BEFORE MEALS 120 capsule 2    chlorthalidone (HYGROTON) 25 MG tablet Take 1 tablet by mouth daily 90 tablet 0    losartan (COZAAR) 50 MG tablet Take 1 tablet by mouth daily 90 tablet 0    fluticasone (FLONASE) 50 MCG/ACT nasal spray SHAKE LIQUID AND USE 2 SPRAYS IN EACH NOSTRIL DAILY 48 g 0    cetirizine (ZYRTEC) 10 MG tablet Take 1 tablet by mouth daily 90 tablet 1    DULoxetine (CYMBALTA) 60 MG extended release capsule Take 1 capsule by mouth daily      clopidogrel (PLAVIX) 75 MG tablet Take 1 tablet by mouth daily 90 tablet 0    atorvastatin (LIPITOR) 80 MG tablet TAKE 1 TABLET BY MOUTH DAILY 90 tablet 0    aspirin 81 MG chewable tablet CHEW AND SWALLOW 1 TABLET BY MOUTH DAILY 90 tablet 0    amLODIPine (NORVASC) 5 MG tablet Take 1 tablet by mouth daily 90 tablet 0    diclofenac sodium (VOLTAREN) 1 % GEL APPLY 4 GRAMS TO LOWER BACK FOUR TIMES DAILY AS NEEDED FOR PAIN      ENBREL 50 MG/ML injection       oxyCODONE-acetaminophen (PERCOCET)  MG per tablet TAKE 1 TABLET BY MOUTH EVERY 6 HOURS AS NEEDED      hydrOXYzine (VISTARIL) 50 MG capsule Take 50 mg by mouth 2 times daily as needed      albuterol sulfate HFA (VENTOLIN HFA) 108 (90 Base) MCG/ACT inhaler INHALE 2 PUFFS INTO THE LUNGS EVERY 6 HOURS AS NEEDED FOR WHEEZING OR SHORTNESS OF BREATH 3 Inhaler 3    azelastine (ASTELIN) 0.1 % nasal spray 2 sprays by Nasal route 2 times daily Use in each nostril as directed 3 Bottle 3    Cholecalciferol (VITAMIN D3) 50 MCG (2000 UT) CAPS Take 1 capsule by mouth daily 90 capsule 3    ranolazine (RANEXA) 1000 MG extended release tablet Take 1 tablet by mouth 2 times daily      zolpidem (AMBIEN) 5 MG tablet Take 5 mg by mouth nightly as needed.  mirtazapine (REMERON) 30 MG tablet Take 1 tablet by mouth nightly as needed (insomnia)      diclofenac sodium 1 % GEL as needed  0    NARCAN 4 MG/0.1ML LIQD nasal spray as needed  0    nitroGLYCERIN (NITROSTAT) 0.4 MG SL tablet up to max of 3 total doses. If no relief after 1 dose, call 911. 25 tablet 3    Multiple Vitamins-Minerals (THERAPEUTIC MULTIVITAMIN-MINERALS) tablet Take 1 tablet by mouth daily      nicotine (NICODERM CQ) 7 MG/24HR Place 1 patch onto the skin daily for 14 days 30 patch 0     No current facility-administered medications for this visit.       No Known Allergies    Past Medical & Surgical History:      Diagnosis Date    Anxiety and depression 9/11/2013    Baker's cyst, ruptured     CAD (coronary artery disease)     Chronic back pain     ankylosing spondylitis , RA    Chronic myocarditis ([facility] [address] 12/1/2016    Diverticulosis of sigmoid colon     Duodenal ulcer     Facet arthropathy     Fibrocystic breast changes     Fracture of metatarsal bone     RIGHT FOOT    Genital warts     GERD (gastroesophageal reflux disease)     Heart attack ([facility] [address] 09/15/2018    History of blood transfusion     loss of blood during pregnancy    History of cervical dysplasia SURGICAL HISTORY Right 3/9/16    lumbar radiofrequency    OTHER SURGICAL HISTORY Right 2016    right hip injection #1    OTHER SURGICAL HISTORY Left 2016    genicular radiofrequency    ROTATOR CUFF REPAIR   at 42 years    Dr Darius Weaver Left 14 at 48years old    Dr. Hannah Bhakta  12       Family History:      Problem Relation Age of Onset    Arthritis Mother     High Blood Pressure Mother     Rheum Arthritis Mother     High Blood Pressure Father     Other Father         emphysema    Osteoarthritis Father     High Blood Pressure Brother     Depression Daughter     High Blood Pressure Brother     No Known Problems Son        Social History:  Social History     Tobacco Use    Smoking status: Former Smoker     Packs/day: 0.50     Years: 40.00     Pack years: 20.00     Types: Cigarettes     Quit date: 4/10/2021     Years since quittin.6    Smokeless tobacco: Never Used    Tobacco comment: 20 down to .5 PPD/ started at age 21-quit three times previously   Substance Use Topics    Alcohol use: No     Alcohol/week: 0.0 standard drinks       Immunization History   Administered Date(s) Administered    COVID-19, Cutler Peter, PF, 30mcg/0.3mL 2021, 2021    Influenza 10/28/2013    Influenza Vaccine, unspecified formulation 10/28/2013, 10/03/2017    Influenza Virus Vaccine 2015, 2016    Influenza, High Dose (Fluzone 65 yrs and older) 10/18/2017    Influenza, MDCK Quadv, IM, PF (Flucelvax 2 yrs and older) 2021    Influenza, Quadv, IM, (6 mo and older Fluzone, Flulaval, Fluarix and 3 yrs and older Afluria) 2016    Influenza, Quadv, IM, PF (6 mo and older Fluzone, Flulaval, Fluarix, and 3 yrs and older Afluria) 10/03/2017, 2019, 2020    Pneumococcal Conjugate 13-valent (Barnetta Side) 2017    MAGNESIUM; Future  - TSH; Future  - Vitamin D 25 Hydroxy; Future  - BASIC METABOLIC PANEL; Future    2. Palpitation  Check labs. Due to patient not having a current cardiology, will refer to Dr. Terrell Cowden  - TSH; Future  - Carlota Goodman MD, Cardiology, [facility] 20; Future      Return to office: Return in about 2 weeks (around 12/30/2021) for fatigue . Counseled regarding above diagnosis, including possible risks and complications, especially if left uncontrolled. I encourage you to do some reading and education about your health. The American Academy of [employer] provides information on many health conditions:http://familydoctor. org     Counseled regarding the possible side effects, risks, benefits and alternatives to treatment; patient and/or guardian verbalizes understanding, agrees, feels comfortable with and wishes to proceed with above treatment plan. Advised patient to call with any new medication issues, and read all Rx info from pharmacy to assure aware of all possible risks and side effects of medication before taking. Reviewed age and gender appropriate health screening exams and vaccinations. Advised patient regarding importance of keeping up with recommended health maintenance and to schedule as soon as possible if overdue, as this is important in assessing for undiagnosed pathology, especially cancer, as well as protecting against potentially harmful/life threatening disease. Patient and/or guardian verbalizes understanding and agrees with above counseling, assessment and plan.      Hardy Epley MD  Family Medicine   PGY-3

## 2021-12-16 NOTE — PROGRESS NOTES
[facility]  Precepting Note    Subjective:  Palpitations, fatigue and weakness x 4 days  Feels cold  Sob - mild  Went to ER yesterday- troponin, normal, slightly leukocytosis, potassium was repleted  EKG was unchanged, CXR was nrmal  ROS otherwise negative     Past medical, surgical, family and social history were reviewed, non-contributory, and unchanged unless otherwise stated. Objective:    /89   Pulse 66   Temp 97.8 °F (36.6 °C) (Temporal)   Resp 20   Ht 4' 9\" (1.448 m)   Wt 163 lb (73.9 kg)   SpO2 97%   BMI 35.27 kg/m²     Exam is as noted by resident     Assessment/Plan:  Palpitations- not at this time  Started after an episode of diarrhea  - monitor  - labs as ordered  May need Holter monitoring if continues    CAD  Refer to cardiology       Attending Physician Statement  I have reviewed the chart, including any radiology or labs. I have discussed the case, including pertinent history and exam findings with the resident. I agree with the assessment, plan and orders as documented by the resident. Please refer to the resident note for additional information.       Electronically signed by Romeo Boyle MD on 12/16/2021 at 10:42 AM

## 2021-12-17 ENCOUNTER — HOSPITAL ENCOUNTER (OUTPATIENT)
Dept: MRI IMAGING | Age: 61
Discharge: HOME | End: 2021-12-19
Payer: COMMERCIAL

## 2021-12-17 ENCOUNTER — TELEPHONE (OUTPATIENT)
Dept: ADMINISTRATIVE | Age: 61
End: 2021-12-17

## 2021-12-17 DIAGNOSIS — M45.9 ANKYLOSING SPONDYLITIS, UNSPECIFIED SITE OF SPINE (HCC): ICD-10-CM

## 2021-12-17 DIAGNOSIS — R53.83 OTHER FATIGUE: ICD-10-CM

## 2021-12-17 DIAGNOSIS — D72.829 LEUKOCYTOSIS, UNSPECIFIED TYPE: Primary | ICD-10-CM

## 2021-12-17 DIAGNOSIS — M54.9 DORSALGIA: ICD-10-CM

## 2021-12-17 LAB
ANION GAP SERPL CALCULATED.3IONS-SCNC: 15 MMOL/L (ref 7–16)
BUN BLDV-MCNC: 27 MG/DL (ref 6–20)
CALCIUM SERPL-MCNC: 9.7 MG/DL (ref 8.6–10.2)
CHLORIDE BLD-SCNC: 103 MMOL/L (ref 98–107)
CO2: 21 MMOL/L (ref 22–29)
CREAT SERPL-MCNC: 0.8 MG/DL (ref 0.5–1)
GFR AFRICAN AMERICAN: >60
GFR NON-AFRICAN AMERICAN: >60 ML/MIN/1.73
GLUCOSE BLD-MCNC: 97 MG/DL (ref 74–99)
MAGNESIUM: 1.7 MG/DL (ref 1.6–2.6)
POTASSIUM SERPL-SCNC: 4.8 MMOL/L (ref 3.5–5)
SODIUM BLD-SCNC: 139 MMOL/L (ref 132–146)
TSH SERPL DL<=0.05 MIU/L-ACNC: 3.37 UIU/ML (ref 0.27–4.2)
VITAMIN D 25-HYDROXY: 34 NG/ML (ref 30–100)

## 2021-12-17 PROCEDURE — 72146 MRI CHEST SPINE W/O DYE: CPT

## 2021-12-17 PROCEDURE — 72148 MRI LUMBAR SPINE W/O DYE: CPT

## 2021-12-17 NOTE — TELEPHONE ENCOUNTER
URGENT NP scheduled from the [facility]. Patient Appointment Form:      PCP: Dr. Catalina Harris  Referring: Dr. Enma Wilcox     Has the Patient:    Seen a Cardiologist? Yes Dr Elif Duran 2018 - Recently Dr. Deshawn Lockhart to [facility]     Had a heart catheterization? Yes 2018 Radha Sethi      Had heart surgery? No    Had a stress test or nuclear stress test? Yes [facility] 2020 pt faxing report     Had an echocardiogram? Yes 2018 and 2020 - Epic     Had a vascular ultrasound? No    Had a 24/48 heart monitor or extended cardiac event monitor? No    Had recent blood work in the last 6 months? Yes 9/23/21 and 12/16/21 Epic     Had a pacemaker/ICD/ILR implant? No    Seen an Electrophysiologist? No        Will send records via: Pt having [facility] recs faxed to [facility] prior recs in [address] [place] [address] Rd       Date & time of appointment:  12/22/21 @ 9:45 with Dr. Adenike Crowe.

## 2021-12-22 ENCOUNTER — OFFICE VISIT (OUTPATIENT)
Dept: CARDIOLOGY CLINIC | Age: 61
End: 2021-12-22
Payer: COMMERCIAL

## 2021-12-22 VITALS
BODY MASS INDEX: 33.06 KG/M2 | SYSTOLIC BLOOD PRESSURE: 152 MMHG | HEART RATE: 55 BPM | RESPIRATION RATE: 18 BRPM | WEIGHT: 164 LBS | DIASTOLIC BLOOD PRESSURE: 63 MMHG | HEIGHT: 59 IN

## 2021-12-22 DIAGNOSIS — R00.2 PALPITATIONS: ICD-10-CM

## 2021-12-22 DIAGNOSIS — I25.10 CAD IN NATIVE ARTERY: Chronic | ICD-10-CM

## 2021-12-22 PROBLEM — Z98.890 S/P CARDIAC CATHETERIZATION: Status: RESOLVED | Noted: 2020-12-03 | Resolved: 2021-12-22

## 2021-12-22 PROBLEM — Z87.891 PERSONAL HISTORY OF TOBACCO USE, PRESENTING HAZARDS TO HEALTH: Status: RESOLVED | Noted: 2019-07-03 | Resolved: 2021-12-22

## 2021-12-22 PROBLEM — I25.2 STATUS POST NON-ST ELEVATION MYOCARDIAL INFARCTION (NSTEMI): Chronic | Status: RESOLVED | Noted: 2018-10-14 | Resolved: 2021-12-22

## 2021-12-22 PROBLEM — G89.4 CHRONIC PAIN SYNDROME: Chronic | Status: RESOLVED | Noted: 2017-09-05 | Resolved: 2021-12-22

## 2021-12-22 PROBLEM — F32.4 MAJOR DEPRESSIVE DISORDER, SINGLE EPISODE, IN PARTIAL REMISSION (HCC): Status: RESOLVED | Noted: 2019-07-03 | Resolved: 2021-12-22

## 2021-12-22 PROBLEM — R07.9 CHEST PAIN: Status: RESOLVED | Noted: 2018-10-02 | Resolved: 2021-12-22

## 2021-12-22 PROBLEM — I20.0 UNSTABLE ANGINA (HCC): Status: RESOLVED | Noted: 2018-10-01 | Resolved: 2021-12-22

## 2021-12-22 PROCEDURE — 93000 ELECTROCARDIOGRAM COMPLETE: CPT | Performed by: INTERNAL MEDICINE

## 2021-12-22 PROCEDURE — 99244 OFF/OP CNSLTJ NEW/EST MOD 40: CPT | Performed by: INTERNAL MEDICINE

## 2021-12-22 NOTE — PROGRESS NOTES
Chief Complaint   Patient presents with    Coronary Artery Disease       Patient Active Problem List    Diagnosis Date Noted    Palpitations 12/22/2021    Inflammatory spondylopathy of lumbosacral region [facility]) 12/04/2020    Obesity     Vertigo 03/21/2019    CAD in native artery      Overview Note:     A. \"ACS\" 2018 (RV):  \" severe sequential lesions small non dominant RCA\"  B.   Cath 9/25/20 Kaur Glaze):  \"70% small OM\"      Mixed hyperlipidemia 09/22/2018    OLIVIA (obstructive sleep apnea) 04/05/2018    HLA B27 (HLA B27 positive) 10/10/2017    Deformity of right foot 11/30/2016     Overview Note:     Dr Carlos Tracy Pulmonary nodule 09/27/2016     Overview Note:     X 2 RLL per CT 9/2016  Needs repeat Chest CT March 2017 1/18 No further f/u needed per pulmonologist      Facet syndrome, lumbar 03/09/2016    Essential hypertension 11/11/2015    Diverticulosis of sigmoid colon     GERD (gastroesophageal reflux disease)     Anxiety and depression 09/11/2013       Current Outpatient Medications   Medication Sig Dispense Refill    metoprolol succinate (TOPROL XL) 100 MG extended release tablet TAKE 1 TABLET BY MOUTH EVERY DAY 90 tablet 0    omeprazole (PRILOSEC) 40 MG delayed release capsule TAKE 1 CAPSULE BY MOUTH DAILY 90 capsule 0    dicyclomine (BENTYL) 10 MG capsule TAKE 1 CAPSULE BY MOUTH FOUR TIMES A DAY BEFORE MEALS 120 capsule 2    chlorthalidone (HYGROTON) 25 MG tablet Take 1 tablet by mouth daily 90 tablet 0    losartan (COZAAR) 50 MG tablet Take 1 tablet by mouth daily 90 tablet 0    fluticasone (FLONASE) 50 MCG/ACT nasal spray SHAKE LIQUID AND USE 2 SPRAYS IN EACH NOSTRIL DAILY 48 g 0    cetirizine (ZYRTEC) 10 MG tablet Take 1 tablet by mouth daily 90 tablet 1    DULoxetine (CYMBALTA) 60 MG extended release capsule Take 1 capsule by mouth daily      clopidogrel (PLAVIX) 75 MG tablet Take 1 tablet by mouth daily 90 tablet 0    atorvastatin (LIPITOR) 80 MG tablet TAKE 1 TABLET BY MOUTH DAILY 90 tablet 0    aspirin 81 MG chewable tablet CHEW AND SWALLOW 1 TABLET BY MOUTH DAILY 90 tablet 0    amLODIPine (NORVASC) 5 MG tablet Take 1 tablet by mouth daily 90 tablet 0    diclofenac sodium (VOLTAREN) 1 % GEL APPLY 4 GRAMS TO LOWER BACK FOUR TIMES DAILY AS NEEDED FOR PAIN      ENBREL 50 MG/ML injection Once per week      oxyCODONE-acetaminophen (PERCOCET)  MG per tablet TAKE 1 TABLET BY MOUTH EVERY 6 HOURS AS NEEDED      hydrOXYzine (VISTARIL) 50 MG capsule Take 50 mg by mouth 2 times daily as needed      albuterol sulfate HFA (VENTOLIN HFA) 108 (90 Base) MCG/ACT inhaler INHALE 2 PUFFS INTO THE LUNGS EVERY 6 HOURS AS NEEDED FOR WHEEZING OR SHORTNESS OF BREATH 3 Inhaler 3    azelastine (ASTELIN) 0.1 % nasal spray 2 sprays by Nasal route 2 times daily Use in each nostril as directed 3 Bottle 3    Cholecalciferol (VITAMIN D3) 50 MCG (2000 UT) CAPS Take 1 capsule by mouth daily 90 capsule 3    ranolazine (RANEXA) 1000 MG extended release tablet Take 1 tablet by mouth 2 times daily      zolpidem (AMBIEN) 5 MG tablet Take 5 mg by mouth nightly as needed.  mirtazapine (REMERON) 30 MG tablet Take 1 tablet by mouth nightly as needed (insomnia)      diclofenac sodium 1 % GEL as needed  0    NARCAN 4 MG/0.1ML LIQD nasal spray as needed  0    Multiple Vitamins-Minerals (THERAPEUTIC MULTIVITAMIN-MINERALS) tablet Take 1 tablet by mouth daily      nicotine (NICODERM CQ) 7 MG/24HR Place 1 patch onto the skin daily for 14 days 30 patch 0     No current facility-administered medications for this visit.         No Known Allergies    Vitals:    12/22/21 0945   BP: (!) 152/63   Pulse: 55   Resp: 18   Weight: 164 lb (74.4 kg)   Height: 4' 11\" (1.499 m)       Social History     Socioeconomic History    Marital status:      Spouse name: Not on file    Number of children: Not on file    Years of education: Not on file    Highest education level: Not on file   Occupational History    Occupation:      Occupation: disabled   Tobacco Use    Smoking status: Former Smoker     Packs/day: 0.50     Years: 40.00     Pack years: 20.00     Types: Cigarettes     Quit date: 4/10/2021     Years since quittin.7    Smokeless tobacco: Never Used    Tobacco comment: 20 down to .5 PPD/ started at age 21-quit three times previously   Vaping Use    Vaping Use: Never used   Substance and Sexual Activity    Alcohol use: No     Alcohol/week: 0.0 standard drinks    Drug use: No    Sexual activity: Not Currently     Partners: Male   Other Topics Concern    Not on file   Social History Narrative    Lives in a house with      Social Determinants of Health     Financial Resource Strain: Low Risk     Difficulty of Paying Living Expenses: Not hard at all   Food Insecurity: No Food Insecurity    Worried About Running Out of Food in the Last Year: Never true    [address] in the Last Year: Never true   Transportation Needs:     Lack of Transportation (Medical): Not on file    Lack of Transportation (Non-Medical):  Not on file   Physical Activity:     Days of Exercise per Week: Not on file    Minutes of Exercise per Session: Not on file   Stress:     Feeling of Stress : Not on file   Social Connections:     Frequency of Communication with Friends and Family: Not on file    Frequency of Social Gatherings with Friends and Family: Not on file    Attends Sabianist Services: Not on file    Active Member of Clubs or Organizations: Not on file    Attends Club or Organization Meetings: Not on file    Marital Status: Not on file   Intimate Partner Violence:     Fear of Current or Ex-Partner: Not on file    Emotionally Abused: Not on file    Physically Abused: Not on file    Sexually Abused: Not on file   Housing Stability:     Unable to Pay for Housing in the Last Year: Not on file    Number of [place] in the Last Year: Not on file    Unstable Housing in the Last Year: Not on file       Family History   Problem Relation Age of Onset    Arthritis Mother     High Blood Pressure Mother     Rheum Arthritis Mother     High Blood Pressure Father     Other Father         emphysema    Osteoarthritis Father     High Blood Pressure Brother     Depression Daughter     High Blood Pressure Brother     No Known Problems Son          SUBJECTIVE: Nicolas Guevara presents to the office today for consult - dr Livan Dillard - for cardiac evaluation. I [facility] . Hx of atypical compliants - cath in 2018 here for [facility] troponin elevation - diseased non dominant RCA noted, no intervention. Another cath in 2020 at [facility] - branch vessel disease only - no intervention. Echo with normal LV function and no valve disease. Continued compliants must have lead to CMR interrogation by dr Darryle Donate, with a diagnosis of \"atrial myocarditis\" called. She does all  Her own housework, but no exercise. Is on DAPT and high intensity statin    She complains of multiple episodes a day of rapid HR, no near or actual syncope . Review of Systems:   Heart: as above   Lungs: as above   Eyes: denies changes in vision or discharge. Ears: denies changes in hearing or pain. Nose: denies epistaxis or masses   Throat: denies sore throat or trouble swallowing. Neuro: denies numbness, tingling, tremors. Skin: denies rashes or itching. : denies hematuria, dysuria   GI: denies vomiting, diarrhea   Psych: denies mood changed, anxiety, depression. all others negative. Physical Exam   BP (!) 152/63   Pulse 55   Resp 18   Ht 4' 11\" (1.499 m)   Wt 164 lb (74.4 kg)   BMI 33.12 kg/m²   Constitutional: Oriented to person, place, and time. Well-developed and well-nourished. No distress. Head: Normocephalic and atraumatic. Eyes: EOM are normal. Pupils are equal, round, and reactive to light. Neck: Normal range of motion. Neck supple.  No hepatojugular reflux and no JVD present. Carotid bruit is not present. Cardiovascular: Normal rate, regular rhythm, normal heart sounds and intact distal pulses. Exam reveals no gallop and no friction rub. No murmur heard. Pulmonary/Chest: Effort normal and breath sounds normal. No respiratory distress. No wheezes. No rales. Abdominal: Soft. Bowel sounds are normal. No distension and no mass. No tenderness. No rebound and no guarding. Musculoskeletal: Normal range of motion. No edema and no tenderness. Neurological: Alert and oriented to person, place, and time. Skin: Skin is warm and dry. No rash noted. Not diaphoretic. No erythema. Psychiatric: Normal mood and affect. Behavior is normal.     EKG:  normal sinus rhythm, nonspecific ST and T waves changes, IRBBB, unchanged from previous tracings.     ASSESSMENT AND PLAN:  Patient Active Problem List   Diagnosis    Anxiety and depression    Essential hypertension    Diverticulosis of sigmoid colon    GERD (gastroesophageal reflux disease)    Facet syndrome, lumbar    Pulmonary nodule    Deformity of right foot    HLA B27 (HLA B27 positive)    OLIVIA (obstructive sleep apnea)    Mixed hyperlipidemia    CAD in native artery    Vertigo    Obesity    Inflammatory spondylopathy of lumbosacral region [facility])    Palpitations     Patient with known CAD with atypical chest pain leading to cardiac catheteriztions in the past - medical management only  No need to continue DAPT - stop plavix  Agree with high intensity statin  Cryptic hx of \"atrial myocarditis\", presumably my CMR, of uncertain clinical validity or importance - no hx of atrial arrhythmias to date  Normal LV function by Echo and CV exam    Now with complaints of intermittent tachycardia - will place 48 hour Holter              Joannie Olszewski, M.D  [id number] [facility]

## 2021-12-22 NOTE — PROGRESS NOTES
Patient was seen in office today for the placement of a 48 hour holter per . Patient tolerated well & understood instructions. Device # [id number]. Mary Pruitt MA.

## 2021-12-28 ENCOUNTER — OFFICE VISIT (OUTPATIENT)
Dept: FAMILY MEDICINE CLINIC | Age: 61
End: 2021-12-28
Payer: COMMERCIAL

## 2021-12-28 VITALS
HEART RATE: 63 BPM | RESPIRATION RATE: 16 BRPM | SYSTOLIC BLOOD PRESSURE: 115 MMHG | DIASTOLIC BLOOD PRESSURE: 75 MMHG | OXYGEN SATURATION: 97 % | HEIGHT: 59 IN | BODY MASS INDEX: 32.05 KG/M2 | TEMPERATURE: 98.6 F | WEIGHT: 159 LBS

## 2021-12-28 DIAGNOSIS — M47.816 FACET SYNDROME, LUMBAR: ICD-10-CM

## 2021-12-28 DIAGNOSIS — G95.19 NEUROGENIC CLAUDICATION (HCC): Primary | ICD-10-CM

## 2021-12-28 DIAGNOSIS — R29.898 WEAKNESS OF BOTH LOWER EXTREMITIES: ICD-10-CM

## 2021-12-28 DIAGNOSIS — I25.10 CAD IN NATIVE ARTERY: Chronic | ICD-10-CM

## 2021-12-28 DIAGNOSIS — I10 ESSENTIAL HYPERTENSION: ICD-10-CM

## 2021-12-28 DIAGNOSIS — Z87.39 HISTORY OF ANKYLOSING SPONDYLITIS: ICD-10-CM

## 2021-12-28 PROCEDURE — 99214 OFFICE O/P EST MOD 30 MIN: CPT | Performed by: FAMILY MEDICINE

## 2021-12-28 RX ORDER — CLOPIDOGREL BISULFATE 75 MG/1
75 TABLET ORAL DAILY
Qty: 90 TABLET | Refills: 0 | Status: SHIPPED
Start: 2021-12-28 | End: 2022-04-01 | Stop reason: SDUPTHER

## 2021-12-28 RX ORDER — AMLODIPINE BESYLATE 5 MG/1
5 TABLET ORAL DAILY
Qty: 90 TABLET | Refills: 0 | Status: SHIPPED
Start: 2021-12-28 | End: 2022-04-01 | Stop reason: SDUPTHER

## 2021-12-28 RX ORDER — ATORVASTATIN CALCIUM 80 MG/1
TABLET, FILM COATED ORAL
Qty: 90 TABLET | Refills: 0 | Status: SHIPPED
Start: 2021-12-28 | End: 2021-12-30

## 2021-12-28 RX ORDER — CHLORTHALIDONE 25 MG/1
25 TABLET ORAL DAILY
Qty: 90 TABLET | Refills: 0 | Status: SHIPPED
Start: 2021-12-28 | End: 2022-04-01 | Stop reason: SDUPTHER

## 2021-12-28 RX ORDER — METOPROLOL SUCCINATE 100 MG/1
TABLET, EXTENDED RELEASE ORAL
Qty: 90 TABLET | Refills: 0 | Status: SHIPPED
Start: 2021-12-28 | End: 2022-04-01 | Stop reason: SDUPTHER

## 2021-12-28 RX ORDER — LOSARTAN POTASSIUM 50 MG/1
50 TABLET ORAL DAILY
Qty: 90 TABLET | Refills: 0 | Status: SHIPPED
Start: 2021-12-28 | End: 2022-04-01

## 2021-12-28 NOTE — PROGRESS NOTES
12/28/2021    Tiffany Cardona is a 63 y.o. female here for   Chief Complaint   Patient presents with    Hypertension    Extremity Weakness     at random times with pain     Sleep Apnea     Reports that she is worried because she had palpitations  During her perevious heart attacks she had minimal symptoms that were not typical.     She went last month due to 'pulsificiations'. She is also worried because of history of spells of once in a while  Where from midwast down she will get 'hit with a feeling of excruciating pain and will have leg weakness. Will feel like she is oin to drop. Th is is new for her . Feels like ' an over abundance of tiredness to her back and legs\"  Will last for 3-5 mintues  She will have to sit and then it goes away. She had a recent MRI of her lower back. Regarding hypertension. Cardiovascular risk factors: hypertension, obesity (BMI >= 30 kg/m2) and sedentary lifestyle. Patient does smoke. Currently on chlorthalidone 25 mg daily, amlodipine 5 mg daily, metoprolol xl 100 mg daily, losartan 50 mg daily. Taking as prescribed. No adverse effects. Today,  BP: 115/75 and she is asymptomatic. BP Readings from Last 3 Encounters:   12/28/21 115/75   12/22/21 (!) 152/63   12/16/21 129/89     Patient denies chest pain, diaphoresis, dyspnea, dyspnea on exertion, peripheral edema, palpitations, headache, vision changes. Had to reschedule her appointment with her rheumatologist.   Still on embrel    Wt Readings from Last 3 Encounters:   12/28/21 159 lb (72.1 kg)   12/22/21 164 lb (74.4 kg)   12/16/21 163 lb (73.9 kg)       She  reports that she quit smoking about 8 months ago. Her smoking use included cigarettes. She has a 20.00 pack-year smoking history. She has never used smokeless tobacco.    Medications and allergies reviewed and updated in chart.     Current Outpatient Medications   Medication Sig Dispense Refill    metoprolol succinate (TOPROL XL) 100 MG extended release tablet TAKE 1 TABLET BY MOUTH EVERY DAY 90 tablet 0    omeprazole (PRILOSEC) 40 MG delayed release capsule TAKE 1 CAPSULE BY MOUTH DAILY 90 capsule 0    dicyclomine (BENTYL) 10 MG capsule TAKE 1 CAPSULE BY MOUTH FOUR TIMES A DAY BEFORE MEALS 120 capsule 2    chlorthalidone (HYGROTON) 25 MG tablet Take 1 tablet by mouth daily 90 tablet 0    losartan (COZAAR) 50 MG tablet Take 1 tablet by mouth daily 90 tablet 0    fluticasone (FLONASE) 50 MCG/ACT nasal spray SHAKE LIQUID AND USE 2 SPRAYS IN EACH NOSTRIL DAILY 48 g 0    cetirizine (ZYRTEC) 10 MG tablet Take 1 tablet by mouth daily 90 tablet 1    DULoxetine (CYMBALTA) 60 MG extended release capsule Take 1 capsule by mouth daily      clopidogrel (PLAVIX) 75 MG tablet Take 1 tablet by mouth daily 90 tablet 0    atorvastatin (LIPITOR) 80 MG tablet TAKE 1 TABLET BY MOUTH DAILY 90 tablet 0    aspirin 81 MG chewable tablet CHEW AND SWALLOW 1 TABLET BY MOUTH DAILY 90 tablet 0    amLODIPine (NORVASC) 5 MG tablet Take 1 tablet by mouth daily 90 tablet 0    diclofenac sodium (VOLTAREN) 1 % GEL APPLY 4 GRAMS TO LOWER BACK FOUR TIMES DAILY AS NEEDED FOR PAIN      ENBREL 50 MG/ML injection Once per week      oxyCODONE-acetaminophen (PERCOCET)  MG per tablet TAKE 1 TABLET BY MOUTH EVERY 6 HOURS AS NEEDED      hydrOXYzine (VISTARIL) 50 MG capsule Take 50 mg by mouth 2 times daily as needed      albuterol sulfate HFA (VENTOLIN HFA) 108 (90 Base) MCG/ACT inhaler INHALE 2 PUFFS INTO THE LUNGS EVERY 6 HOURS AS NEEDED FOR WHEEZING OR SHORTNESS OF BREATH 3 Inhaler 3    azelastine (ASTELIN) 0.1 % nasal spray 2 sprays by Nasal route 2 times daily Use in each nostril as directed 3 Bottle 3    Cholecalciferol (VITAMIN D3) 50 MCG (2000 UT) CAPS Take 1 capsule by mouth daily 90 capsule 3    ranolazine (RANEXA) 1000 MG extended release tablet Take 1 tablet by mouth 2 times daily      zolpidem (AMBIEN) 5 MG tablet Take 5 mg by mouth nightly as needed.       mirtazapine (REMERON) 30 MG tablet Take 1 tablet by mouth nightly as needed (insomnia)      diclofenac sodium 1 % GEL as needed  0    NARCAN 4 MG/0.1ML LIQD nasal spray as needed  0    Multiple Vitamins-Minerals (THERAPEUTIC MULTIVITAMIN-MINERALS) tablet Take 1 tablet by mouth daily      nicotine (NICODERM CQ) 7 MG/24HR Place 1 patch onto the skin daily for 14 days 30 patch 0     No current facility-administered medications for this visit. Patient'spast medical, surgical, social and/or family history reviewed, updated in chart, and are non-contributory (unless otherwise stated). Review of Systems  Review of Systems   Constitutional: Negative for chills and fever. Respiratory: Negative for cough and wheezing. Cardiovascular: Negative for chest pain and leg swelling. Neurological: Negative for dizziness and headaches. PE:  VS:  /75   Pulse 63   Temp 98.6 °F (37 °C) (Temporal)   Resp 16   Ht 4' 11\" (1.499 m)   Wt 159 lb (72.1 kg)   SpO2 97%   BMI 32.11 kg/m²   Physical Exam  Constitutional:       Appearance: She is well-developed. HENT:      Head: Normocephalic and atraumatic. Cardiovascular:      Rate and Rhythm: Normal rate and regular rhythm. Heart sounds: No murmur heard. No friction rub. No gallop. Pulmonary:      Effort: Pulmonary effort is normal.      Breath sounds: Normal breath sounds. No wheezing or rales. Musculoskeletal:      Comments: Positive straight leg test  Pain with lumbar extension   Skin:     General: Skin is warm and dry. Neurological:      Mental Status: She is alert and oriented to person, place, and time. Assessment/Plan:  Tiffany was seen today for hypertension, extremity weakness and sleep apnea.     Diagnoses and all orders for this visit:    Neurogenic claudication ([address] vs arterial claudication vs other ddx also includes polymyalgia rheumatica  Check labs  Check CORDELL  F/u with rheumatology and pain management  -     SEDIMENTATION RATE; Future  -     C-REACTIVE PROTEIN; Future  -     LIPID PANEL; Future  -     MAGNESIUM; Future  -     VL CORDELL BILATERAL LIMITED 1-2 LEVELS; Future    Weakness of both lower extremities  As above  -     VL CORDELL BILATERAL LIMITED 1-2 LEVELS; Future    Facet syndrome, lumbar    CAD in native artery  Asymptomatic  -     atorvastatin (LIPITOR) 80 MG tablet; TAKE 1 TABLET BY MOUTH DAILY  -     clopidogrel (PLAVIX) 75 MG tablet; Take 1 tablet by mouth daily  -     metoprolol succinate (TOPROL XL) 100 MG extended release tablet; TAKE 1 TABLET BY MOUTH EVERY DAY  -     losartan (COZAAR) 50 MG tablet; Take 1 tablet by mouth daily    Essential hypertension  At goal  Cont current dose of amlodipine, metoprolol xl 100 mg daily, losartan 50 mg daily, chlorthalidone 25 mg daily  -     LIPID PANEL; Future  -     chlorthalidone (HYGROTON) 25 MG tablet; Take 1 tablet by mouth daily  -     amLODIPine (NORVASC) 5 MG tablet; Take 1 tablet by mouth daily  -     metoprolol succinate (TOPROL XL) 100 MG extended release tablet; TAKE 1 TABLET BY MOUTH EVERY DAY  -     losartan (COZAAR) 50 MG tablet; Take 1 tablet by mouth daily      Return in about 3 months (around 3/28/2022). Advised patient to call with any new medication issues. All questions answered.   Call or go to emergency department ifsymptoms worsen or persist.

## 2021-12-29 ENCOUNTER — NURSE ONLY (OUTPATIENT)
Dept: FAMILY MEDICINE CLINIC | Age: 61
End: 2021-12-29
Payer: COMMERCIAL

## 2021-12-29 DIAGNOSIS — E66.01 CLASS 3 SEVERE OBESITY DUE TO EXCESS CALORIES WITH SERIOUS COMORBIDITY AND BODY MASS INDEX (BMI) OF 40.0 TO 44.9 IN ADULT (HCC): ICD-10-CM

## 2021-12-29 DIAGNOSIS — I25.10 CAD IN NATIVE ARTERY: Chronic | ICD-10-CM

## 2021-12-29 DIAGNOSIS — I10 ESSENTIAL HYPERTENSION: ICD-10-CM

## 2021-12-29 DIAGNOSIS — E78.2 MIXED HYPERLIPIDEMIA: Chronic | ICD-10-CM

## 2021-12-29 DIAGNOSIS — R29.818 NEUROGENIC CLAUDICATION: ICD-10-CM

## 2021-12-29 PROBLEM — Z87.39 HISTORY OF ANKYLOSING SPONDYLITIS: Status: ACTIVE | Noted: 2021-12-29

## 2021-12-29 LAB
C-REACTIVE PROTEIN: 0.3 MG/DL (ref 0–0.4)
CHOLESTEROL, TOTAL: 236 MG/DL (ref 0–199)
HDLC SERPL-MCNC: 37 MG/DL
LDL CHOLESTEROL CALCULATED: 166 MG/DL (ref 0–99)
MAGNESIUM: 1.8 MG/DL (ref 1.6–2.6)
SEDIMENTATION RATE, ERYTHROCYTE: 0 MM/HR (ref 0–20)
TRIGL SERPL-MCNC: 164 MG/DL (ref 0–149)
VLDLC SERPL CALC-MCNC: 33 MG/DL

## 2021-12-29 PROCEDURE — 36415 COLL VENOUS BLD VENIPUNCTURE: CPT | Performed by: FAMILY MEDICINE

## 2021-12-29 ASSESSMENT — ENCOUNTER SYMPTOMS
WHEEZING: 0
COUGH: 0

## 2021-12-30 RX ORDER — ROSUVASTATIN CALCIUM 40 MG/1
40 TABLET, COATED ORAL DAILY
Qty: 30 TABLET | Refills: 5 | Status: SHIPPED
Start: 2021-12-30 | End: 2022-04-01 | Stop reason: SDUPTHER

## 2022-01-03 DIAGNOSIS — I25.10 CAD IN NATIVE ARTERY: Chronic | ICD-10-CM

## 2022-01-05 ENCOUNTER — TELEPHONE (OUTPATIENT)
Dept: CARDIOLOGY CLINIC | Age: 62
End: 2022-01-05

## 2022-01-05 NOTE — TELEPHONE ENCOUNTER
----- Message from Krystle Basilio MD sent at 1/4/2022  8:39 AM EST -----  Monitor did not show atrial fibrillation  Does show some skipped beats for which toprol is good drug - we cannot raise her dose further however given low HROv 6 months.   ? office

## 2022-01-10 DIAGNOSIS — J30.9 ALLERGIC RHINITIS, UNSPECIFIED SEASONALITY, UNSPECIFIED TRIGGER: ICD-10-CM

## 2022-01-11 RX ORDER — FLUTICASONE PROPIONATE 50 MCG
SPRAY, SUSPENSION (ML) NASAL
Qty: 48 G | Refills: 0 | Status: SHIPPED
Start: 2022-01-11 | End: 2022-04-01 | Stop reason: SDUPTHER

## 2022-02-07 ENCOUNTER — OFFICE VISIT (OUTPATIENT)
Dept: FAMILY MEDICINE CLINIC | Age: 62
End: 2022-02-07
Payer: COMMERCIAL

## 2022-02-07 VITALS
HEIGHT: 59 IN | OXYGEN SATURATION: 96 % | DIASTOLIC BLOOD PRESSURE: 73 MMHG | WEIGHT: 163 LBS | HEART RATE: 62 BPM | SYSTOLIC BLOOD PRESSURE: 121 MMHG | TEMPERATURE: 97 F | BODY MASS INDEX: 32.86 KG/M2

## 2022-02-07 DIAGNOSIS — Z13.31 POSITIVE DEPRESSION SCREENING: ICD-10-CM

## 2022-02-07 DIAGNOSIS — R41.3 MEMORY PROBLEM: ICD-10-CM

## 2022-02-07 DIAGNOSIS — G47.33 OSA (OBSTRUCTIVE SLEEP APNEA): ICD-10-CM

## 2022-02-07 DIAGNOSIS — F33.9 EPISODE OF RECURRENT MAJOR DEPRESSIVE DISORDER, UNSPECIFIED DEPRESSION EPISODE SEVERITY (HCC): Primary | ICD-10-CM

## 2022-02-07 DIAGNOSIS — Z76.0 MEDICATION REFILL: ICD-10-CM

## 2022-02-07 PROCEDURE — 99214 OFFICE O/P EST MOD 30 MIN: CPT | Performed by: FAMILY MEDICINE

## 2022-02-07 RX ORDER — ARIPIPRAZOLE 5 MG/1
5 TABLET ORAL DAILY
Qty: 30 TABLET | Refills: 1 | Status: SHIPPED
Start: 2022-02-07 | End: 2022-02-07 | Stop reason: SINTOL

## 2022-02-07 RX ORDER — ETANERCEPT 25 MG
25 KIT SUBCUTANEOUS ONCE
Qty: 9 EACH | Refills: 0 | Status: CANCELLED | OUTPATIENT
Start: 2022-02-07 | End: 2022-02-07

## 2022-02-07 ASSESSMENT — COLUMBIA-SUICIDE SEVERITY RATING SCALE - C-SSRS
1. WITHIN THE PAST MONTH, HAVE YOU WISHED YOU WERE DEAD OR WISHED YOU COULD GO TO SLEEP AND NOT WAKE UP?: NO
6. HAVE YOU EVER DONE ANYTHING, STARTED TO DO ANYTHING, OR PREPARED TO DO ANYTHING TO END YOUR LIFE?: NO
2. HAVE YOU ACTUALLY HAD ANY THOUGHTS OF KILLING YOURSELF?: NO

## 2022-02-07 ASSESSMENT — PATIENT HEALTH QUESTIONNAIRE - PHQ9
SUM OF ALL RESPONSES TO PHQ QUESTIONS 1-9: 21
5. POOR APPETITE OR OVEREATING: 1
SUM OF ALL RESPONSES TO PHQ QUESTIONS 1-9: 21
7. TROUBLE CONCENTRATING ON THINGS, SUCH AS READING THE NEWSPAPER OR WATCHING TELEVISION: 3
SUM OF ALL RESPONSES TO PHQ9 QUESTIONS 1 & 2: 5
3. TROUBLE FALLING OR STAYING ASLEEP: 3
9. THOUGHTS THAT YOU WOULD BE BETTER OFF DEAD, OR OF HURTING YOURSELF: 0
10. IF YOU CHECKED OFF ANY PROBLEMS, HOW DIFFICULT HAVE THESE PROBLEMS MADE IT FOR YOU TO DO YOUR WORK, TAKE CARE OF THINGS AT HOME, OR GET ALONG WITH OTHER PEOPLE: 3
6. FEELING BAD ABOUT YOURSELF - OR THAT YOU ARE A FAILURE OR HAVE LET YOURSELF OR YOUR FAMILY DOWN: 3
8. MOVING OR SPEAKING SO SLOWLY THAT OTHER PEOPLE COULD HAVE NOTICED. OR THE OPPOSITE, BEING SO FIGETY OR RESTLESS THAT YOU HAVE BEEN MOVING AROUND A LOT MORE THAN USUAL: 3
2. FEELING DOWN, DEPRESSED OR HOPELESS: 2
4. FEELING TIRED OR HAVING LITTLE ENERGY: 3
1. LITTLE INTEREST OR PLEASURE IN DOING THINGS: 3

## 2022-02-07 NOTE — PROGRESS NOTES
[facility]  Department of Family Medicine  Family Medicine Residency Program      Patient:  Tim Harris 63 y.o. female  Date of Service: 2/7/22      Chief complaint:   Chief Complaint   Patient presents with    Memory Loss     for about the last 4 to 6 months it seems to be progressing          History ofPresent Illness   Tiffany MAYELIN Raygoza is a 63 y.o. female who presents to the clinic with complaints as above.     Memory Concerns  For the past 6 months  Forgets where they're going in the car  Forgets conversationss  Knows she has sleep apnea, can't sleep with the mask  Doesn't forget kids names  MOCA 22/30 today, confounded by mood symptoms    Mood symptoms  Stopped taking Abilify about 14 months ago, it gave her tardive dyskinesia  Anxious, tired, anger, irritable  Lot going on  Worries about grandkids, son in trouble,  sick  Has been using family counseling, however has difficulty getting in to see her counselor  Is interested in finding a new counselor    OLIVIA  Not using her CPAP, doesn't like the mask  May also be confounding the symptoms and every  Advised to contact her pulmonologist, Dr. Salomon Cons for further care on this    Past Medical History:      Diagnosis Date    Anxiety and depression 9/11/2013    Baker's cyst, ruptured     CAD (coronary artery disease)     Chronic back pain     ankylosing spondylitis , RA    Chronic myocarditis ([address] 12/1/2016    Diverticulosis of sigmoid colon     Duodenal ulcer     Facet arthropathy     Fibrocystic breast changes     Fracture of metatarsal bone     RIGHT FOOT    Genital warts     GERD (gastroesophageal reflux disease)     Heart attack ([address] 09/15/2018    History of blood transfusion     loss of blood during pregnancy    History of cervical dysplasia 12/16/2013    Hyperlipidemia     Hypertension     IGT (impaired glucose tolerance) 11/11/2015    Myocarditis ([address]     Dr. Rothman Govern    Obesity     OLIVIA (obstructive sleep apnea)     CPAP    Osteoarthritis of multiple joints 8/13/2013    [facility]     Primary insomnia 11/11/2015    Protruded cervical disc/DDD with neural foraminal stenosis 6/10/2015    Pulmonary nodule 9/27/2016    Sacroiliitis ([facility] 6/2/2014    Shingles     Tobacco abuse     Valvular heart disease 11/30/2016    Per cardiac MRI 11/8/16-Mild TR/Mild AI Follows with [facility] Dr Isela Dunn       Past Surgical History:        Procedure Laterality Date    BUNIONECTOMY  great toe    CARDIAC CATHETERIZATION  09/24/2018    COLONOSCOPY  8/20/12    Dr. Mark Donnelly  11/2016    Dr Teresita Nelson great toe/skin    FOOT [address], TOTAL ABDOMINAL  2001    elective due to abnormal cells    JOINT REPLACEMENT Right     Knee    KNEE SURGERY Bilateral     arthroscopy    NERVE BLOCK  07/16/14    right sacroiliac joint #1    NERVE BLOCK Right 11/3/14    SI injection #2    NERVE BLOCK Right 11 19 14    si inj #3    NERVE BLOCK Bilateral 4/22/2015    greater occipital nerve block  #1    NERVE BLOCK Bilateral 5/6/15    greater occipital nerve block #2    NERVE BLOCK Bilateral 05/13/15    greater occipital nerve block #3    NERVE BLOCK Right 6/10/15    cervical transforaminal #1    NERVE BLOCK      Pain Management    NERVE BLOCK Right 1/18/2016    right hip injection  #1    NERVE BLOCK Right 1/25/16    hip injection #2    NERVE BLOCK  2/17/15    bilateral occipital     NERVE BLOCK Left 08/15/2016    genicular #1    NERVE BLOCK  09/07/2016    left genicular nerve block #2    NERVE BLOCK Right 12/07/2016    hip injection #2    NERVE BLOCK Right 04/19/2017    hip #1    NERVE BLOCK Right 09/11/2017    hip #2    OTHER SURGICAL HISTORY Right 2 9 15    lumbar radiofrequency    OTHER SURGICAL HISTORY Right 3/9/16    lumbar radiofrequency    OTHER SURGICAL HISTORY Right 11/14/2016    right hip injection #1    OTHER SURGICAL HISTORY Left 2016    genicular radiofrequency    ROTATOR CUFF REPAIR   at 42 years    Dr Xi Max Left 14 at 48years old    Dr. Natividad Zamora  12       Allergies:    Patient has no known allergies. Social History:   Social History     Socioeconomic History    Marital status:      Spouse name: Not on file    Number of children: Not on file    Years of education: Not on file    Highest education level: Not on file   Occupational History    Occupation:      Occupation: disabled   Tobacco Use    Smoking status: Former Smoker     Packs/day: 0.50     Years: 40.00     Pack years: 20.00     Types: Cigarettes     Quit date: 4/10/2021     Years since quittin.8    Smokeless tobacco: Never Used    Tobacco comment: 20 down to .5 PPD/ started at age 21-quit three times previously   Vaping Use    Vaping Use: Never used   Substance and Sexual Activity    Alcohol use: No     Alcohol/week: 0.0 standard drinks    Drug use: No    Sexual activity: Not Currently     Partners: Male   Other Topics Concern    Not on file   Social History Narrative    Lives in a house with      Social Determinants of Health     Financial Resource Strain: Low Risk     Difficulty of Paying Living Expenses: Not hard at all   Food Insecurity: No Food Insecurity    Worried About [facility] in the Last Year: Never true    [facility] in the Last Year: Never true   Transportation Needs:     Lack of Transportation (Medical): Not on file    Lack of Transportation (Non-Medical):  Not on file   Physical Activity:     Days of Exercise per Week: Not on file    Minutes of Exercise per Session: Not on file   Stress:     Feeling of Stress : Not on file   Social Connections:     Frequency of Communication with Friends and Family: Not on file    Frequency of Social Gatherings with Friends and Family: Not on file    Attends Nondenominational Services: Not on file    Active Member of Clubs or Organizations: Not on file    Attends Club or Organization Meetings: Not on file    Marital Status: Not on file   Intimate Partner Violence:     Fear of Current or Ex-Partner: Not on file    Emotionally Abused: Not on file    Physically Abused: Not on file    Sexually Abused: Not on file   Housing Stability:     Unable to Pay for Housing in the Last Year: Not on file    Number of [place] in the Last Year: Not on file    Unstable Housing in the Last Year: Not on file        Family History:       Problem Relation Age of Onset    Arthritis Mother     High Blood Pressure Mother     Rheum Arthritis Mother     High Blood Pressure Father     Other Father         emphysema    Osteoarthritis Father     High Blood Pressure Brother     Depression Daughter     High Blood Pressure Brother     No Known Problems Son        Medication List:    Current Outpatient Medications   Medication Sig Dispense Refill    Etanercept 50 MG/ML SOAJ Inject 50 mg into the skin once a week 4 each 1    fluticasone (FLONASE) 50 MCG/ACT nasal spray SHAKE LIQUID AND USE 2 SPRAYS IN EACH NOSTRIL DAILY 48 g 0    rosuvastatin (CRESTOR) 40 MG tablet Take 1 tablet by mouth daily 30 tablet 5    chlorthalidone (HYGROTON) 25 MG tablet Take 1 tablet by mouth daily 90 tablet 0    clopidogrel (PLAVIX) 75 MG tablet Take 1 tablet by mouth daily 90 tablet 0    amLODIPine (NORVASC) 5 MG tablet Take 1 tablet by mouth daily 90 tablet 0    metoprolol succinate (TOPROL XL) 100 MG extended release tablet TAKE 1 TABLET BY MOUTH EVERY DAY 90 tablet 0    losartan (COZAAR) 50 MG tablet Take 1 tablet by mouth daily 90 tablet 0    omeprazole (PRILOSEC) 40 MG delayed release capsule TAKE 1 CAPSULE BY MOUTH DAILY 90 capsule 0    dicyclomine (BENTYL) 10 MG capsule TAKE 1 CAPSULE BY MOUTH FOUR TIMES A DAY BEFORE MEALS 120 capsule 2    cetirizine (ZYRTEC) 10 MG tablet Take 1 tablet by mouth daily 90 tablet 1    DULoxetine (CYMBALTA) 60 MG extended release capsule Take 1 capsule by mouth daily      aspirin 81 MG chewable tablet CHEW AND SWALLOW 1 TABLET BY MOUTH DAILY 90 tablet 0    diclofenac sodium (VOLTAREN) 1 % GEL APPLY 4 GRAMS TO LOWER BACK FOUR TIMES DAILY AS NEEDED FOR PAIN      oxyCODONE-acetaminophen (PERCOCET)  MG per tablet TAKE 1 TABLET BY MOUTH EVERY 6 HOURS AS NEEDED      hydrOXYzine (VISTARIL) 50 MG capsule Take 50 mg by mouth 2 times daily as needed      albuterol sulfate HFA (VENTOLIN HFA) 108 (90 Base) MCG/ACT inhaler INHALE 2 PUFFS INTO THE LUNGS EVERY 6 HOURS AS NEEDED FOR WHEEZING OR SHORTNESS OF BREATH 3 Inhaler 3    azelastine (ASTELIN) 0.1 % nasal spray 2 sprays by Nasal route 2 times daily Use in each nostril as directed 3 Bottle 3    Cholecalciferol (VITAMIN D3) 50 MCG (2000 UT) CAPS Take 1 capsule by mouth daily 90 capsule 3    ranolazine (RANEXA) 1000 MG extended release tablet Take 1 tablet by mouth 2 times daily      zolpidem (AMBIEN) 5 MG tablet Take 5 mg by mouth nightly as needed.  mirtazapine (REMERON) 30 MG tablet Take 1 tablet by mouth nightly as needed (insomnia)      diclofenac sodium 1 % GEL as needed  0    NARCAN 4 MG/0.1ML LIQD nasal spray as needed  0    Multiple Vitamins-Minerals (THERAPEUTIC MULTIVITAMIN-MINERALS) tablet Take 1 tablet by mouth daily      nicotine (NICODERM CQ) 7 MG/24HR Place 1 patch onto the skin daily for 14 days 30 patch 0     No current facility-administered medications for this visit. Review of Systems:   Review of Systems as per HPI    Physical Exam   Vitals: /73   Pulse 62   Temp 97 °F (36.1 °C) (Temporal)   Ht 4' 11\" (1.499 m)   Wt 163 lb (73.9 kg)   SpO2 96%   BMI 32.92 kg/m²   Physical Exam  Vitals and nursing note reviewed. Constitutional:       General: She is not in acute distress. Appearance: Normal appearance. and complications, especially if left uncontrolled. Counseled regarding the possible side effects, risks, benefits and alternatives to treatment; patient and/or guardian verbalizes understanding, agrees, feels comfortable with, and wishes to proceed with above treatment plan. Call or go to ED immediately if symptoms worsen or persist. Advised patient to call with any new medication issues and, as applicable, read all Rx info from pharmacy to assure aware of all possible risks and side effects of medication before taking. Patient and/or guardian given opportunity to ask questions/raise concerns. The patient verbalized comfort and understanding of instructions. I encourage further reading and education about your health conditions. Information on many health conditions is provided by the American Academy of Family [employer]icians: https://familydoctor. org/  Please bring any questions to me at your next visit. Return to Office: Return in about 4 weeks (around 3/7/2022) for FU mood.     Vernelle Schwab, DO

## 2022-02-07 NOTE — PROGRESS NOTES
S: 63 y.o. female with   Chief Complaint   Patient presents with    Memory Loss     for about the last 4 to 6 months it seems to be progressing        Memory loss  -new issue  -started 4-6 months ago  -forgets where she is going when she is in the car  -seems to be worsening  -has anxiety, fatigue, anger     O: VS:  height is 4' 11\" (1.499 m) and weight is 163 lb (73.9 kg). Her temporal temperature is 97 °F (36.1 °C). Her blood pressure is 121/73 and her pulse is 62. Her oxygen saturation is 96%. BP Readings from Last 3 Encounters:   02/07/22 121/73   12/28/21 115/75   12/22/21 (!) 152/63     See resident note  [address] [facility] [address]    Impression/Plan:   1) Memory loss - possibly secondary to mood vs other - encourage talk with pulm about trying different OLIVIA mask, start abilify to see if this helps with mood and thus helps with her memory issue? Health Maintenance Due   Topic Date Due    Depression Monitoring  Never done    Shingles Vaccine (1 of 2) Never done    Low dose CT lung screening  12/27/2020    A1C test (Diabetic or Prediabetic)  08/13/2021    COVID-19 Vaccine (3 - Booster for Cutler Peter series) 10/13/2021         Attending Physician Statement  I have discussed the case, including pertinent history and exam findings with the resident. I agree with the documented assessment and plan.       Porter Maria, DO

## 2022-02-08 DIAGNOSIS — R19.7 DIARRHEA, UNSPECIFIED TYPE: ICD-10-CM

## 2022-02-08 RX ORDER — DICYCLOMINE HYDROCHLORIDE 10 MG/1
CAPSULE ORAL
Qty: 120 CAPSULE | Refills: 2 | Status: SHIPPED
Start: 2022-02-08 | End: 2022-04-01 | Stop reason: SDUPTHER

## 2022-03-06 DIAGNOSIS — K21.9 GASTROESOPHAGEAL REFLUX DISEASE, UNSPECIFIED WHETHER ESOPHAGITIS PRESENT: Chronic | ICD-10-CM

## 2022-03-08 RX ORDER — OMEPRAZOLE 40 MG/1
40 CAPSULE, DELAYED RELEASE ORAL DAILY
Qty: 90 CAPSULE | Refills: 0 | Status: SHIPPED
Start: 2022-03-08 | End: 2022-04-01 | Stop reason: SDUPTHER

## 2022-04-01 ENCOUNTER — OFFICE VISIT (OUTPATIENT)
Dept: FAMILY MEDICINE CLINIC | Age: 62
End: 2022-04-01
Payer: COMMERCIAL

## 2022-04-01 VITALS
HEART RATE: 58 BPM | SYSTOLIC BLOOD PRESSURE: 116 MMHG | BODY MASS INDEX: 31.45 KG/M2 | OXYGEN SATURATION: 98 % | WEIGHT: 156 LBS | DIASTOLIC BLOOD PRESSURE: 85 MMHG | TEMPERATURE: 97.3 F | HEIGHT: 59 IN

## 2022-04-01 DIAGNOSIS — I25.10 CAD IN NATIVE ARTERY: Chronic | ICD-10-CM

## 2022-04-01 DIAGNOSIS — R53.82 CHRONIC FATIGUE: Primary | ICD-10-CM

## 2022-04-01 DIAGNOSIS — I10 ESSENTIAL HYPERTENSION: ICD-10-CM

## 2022-04-01 DIAGNOSIS — Z12.31 ENCOUNTER FOR SCREENING MAMMOGRAM FOR MALIGNANT NEOPLASM OF BREAST: ICD-10-CM

## 2022-04-01 DIAGNOSIS — R45.84 ANHEDONIA: ICD-10-CM

## 2022-04-01 DIAGNOSIS — G47.33 OSA (OBSTRUCTIVE SLEEP APNEA): ICD-10-CM

## 2022-04-01 PROCEDURE — 99214 OFFICE O/P EST MOD 30 MIN: CPT | Performed by: FAMILY MEDICINE

## 2022-04-01 RX ORDER — LOSARTAN POTASSIUM 50 MG/1
50 TABLET ORAL DAILY
Qty: 90 TABLET | Refills: 0 | Status: SHIPPED
Start: 2022-04-01 | End: 2022-11-01

## 2022-04-01 RX ORDER — OMEPRAZOLE 40 MG/1
40 CAPSULE, DELAYED RELEASE ORAL DAILY
Qty: 90 CAPSULE | Refills: 0 | Status: SHIPPED
Start: 2022-04-01 | End: 2022-09-17

## 2022-04-01 RX ORDER — CETIRIZINE HYDROCHLORIDE 10 MG/1
10 TABLET ORAL DAILY
Qty: 90 TABLET | Refills: 1 | Status: SHIPPED | OUTPATIENT
Start: 2022-04-01

## 2022-04-01 RX ORDER — METOPROLOL SUCCINATE 100 MG/1
TABLET, EXTENDED RELEASE ORAL
Qty: 90 TABLET | Refills: 0 | Status: SHIPPED
Start: 2022-04-01 | End: 2022-09-17

## 2022-04-01 RX ORDER — ACETAMINOPHEN 160 MG
2000 TABLET,DISINTEGRATING ORAL DAILY
Qty: 90 CAPSULE | Refills: 3 | Status: SHIPPED
Start: 2022-04-01 | End: 2022-07-06 | Stop reason: CLARIF

## 2022-04-01 RX ORDER — AMLODIPINE BESYLATE 5 MG/1
5 TABLET ORAL DAILY
Qty: 90 TABLET | Refills: 0 | Status: SHIPPED
Start: 2022-04-01 | End: 2022-06-20

## 2022-04-01 RX ORDER — ASPIRIN 81 MG/1
TABLET, CHEWABLE ORAL
Qty: 90 TABLET | Refills: 0 | Status: SHIPPED | OUTPATIENT
Start: 2022-04-01

## 2022-04-01 RX ORDER — AZELASTINE 1 MG/ML
2 SPRAY, METERED NASAL 2 TIMES DAILY
Qty: 3 EACH | Refills: 3 | Status: SHIPPED | OUTPATIENT
Start: 2022-04-01

## 2022-04-01 RX ORDER — ROSUVASTATIN CALCIUM 40 MG/1
40 TABLET, COATED ORAL DAILY
Qty: 30 TABLET | Refills: 3 | Status: SHIPPED
Start: 2022-04-01 | End: 2022-07-27 | Stop reason: SDUPTHER

## 2022-04-01 RX ORDER — MIRTAZAPINE 30 MG/1
30 TABLET, FILM COATED ORAL NIGHTLY PRN
Qty: 30 TABLET | Status: CANCELLED | OUTPATIENT
Start: 2022-04-01

## 2022-04-01 RX ORDER — FLUTICASONE PROPIONATE 50 MCG
SPRAY, SUSPENSION (ML) NASAL
Qty: 48 G | Refills: 3 | Status: SHIPPED | OUTPATIENT
Start: 2022-04-01

## 2022-04-01 RX ORDER — LOSARTAN POTASSIUM 50 MG/1
50 TABLET ORAL DAILY
Qty: 90 TABLET | Refills: 0 | Status: SHIPPED
Start: 2022-04-01 | End: 2022-04-01 | Stop reason: SDUPTHER

## 2022-04-01 RX ORDER — DICYCLOMINE HYDROCHLORIDE 10 MG/1
CAPSULE ORAL
Qty: 120 CAPSULE | Refills: 2 | Status: SHIPPED
Start: 2022-04-01 | End: 2022-04-29

## 2022-04-01 RX ORDER — RANOLAZINE 1000 MG/1
TABLET, EXTENDED RELEASE ORAL
Qty: 180 TABLET | Refills: 1 | Status: SHIPPED | OUTPATIENT
Start: 2022-04-01

## 2022-04-01 RX ORDER — CLOPIDOGREL BISULFATE 75 MG/1
75 TABLET ORAL DAILY
Qty: 90 TABLET | Refills: 0 | Status: SHIPPED
Start: 2022-04-01 | End: 2022-07-06 | Stop reason: CLARIF

## 2022-04-01 RX ORDER — CHLORTHALIDONE 25 MG/1
25 TABLET ORAL DAILY
Qty: 90 TABLET | Refills: 0 | Status: SHIPPED
Start: 2022-04-01 | End: 2022-07-12

## 2022-04-01 RX ORDER — RANOLAZINE 1000 MG/1
1000 TABLET, EXTENDED RELEASE ORAL 2 TIMES DAILY
Qty: 60 TABLET | Refills: 3 | Status: SHIPPED
Start: 2022-04-01 | End: 2022-04-01

## 2022-04-01 RX ORDER — ALBUTEROL SULFATE 90 UG/1
AEROSOL, METERED RESPIRATORY (INHALATION)
Qty: 1 EACH | Refills: 2 | Status: SHIPPED | OUTPATIENT
Start: 2022-04-01

## 2022-04-01 NOTE — PATIENT INSTRUCTIONS
Saravanan De La Vega MD Consulting Physician Rheumatology 04/01/2022 End  4/1/22   Phone: [phone]; Fax: [phone]

## 2022-04-01 NOTE — TELEPHONE ENCOUNTER
Last Appointment   4/1/2022  Next Appointment  7/5/2022    Patient requesting 90 day supply for cost effectiveness

## 2022-04-01 NOTE — PROGRESS NOTES
4/1/2022    Fabiano Bobby is a 63 y.o. female here for   Chief Complaint   Patient presents with    Fatigue     Here with c/o fatigue. She has a lot of pain. She denies feeling depressed exactly but has been feeling unmotivated. She spends a lto fo time caring for her spouse and her kids/ grandkids. She used to work on a casual basis, but has been discouraged from doing computer work due to some 'lapses' and so she feels liek she has been pushed out of this as well. She is worried about her back. She admits to not using cpap. At last visit was c/o memory loss - encouraged to discuss with pulm regarding OLIVIA mask. Has not seen dr blas in some time. She has been feeling fatigued  She is following with rheumatology but is overdue to make an appointment. Contact information given. Had MRI of the back  Results briefly reviewed. Medication list reviewed. He has insomina - on Dot, remeron, cymbalta. Goes to [employer]. She takes [place] on as needed basis. She has been trying to get in to see a counselor but has had issues with acccess due to availability. She is approaching her 62th birthday. Wt Readings from Last 3 Encounters:   04/01/22 156 lb (70.8 kg)   02/07/22 163 lb (73.9 kg)   12/28/21 159 lb (72.1 kg)       She  reports that she quit smoking about a year ago. Her smoking use included cigarettes. She has a 20.00 pack-year smoking history. She has never used smokeless tobacco.    Medications and allergies reviewed and updated in chart.     Current Outpatient Medications   Medication Sig Dispense Refill    omeprazole (PRILOSEC) 40 MG delayed release capsule TAKE 1 CAPSULE BY MOUTH DAILY 90 capsule 0    dicyclomine (BENTYL) 10 MG capsule TAKE 1 CAPSULE BY MOUTH FOUR TIMES A DAY BEFORE MEALS 120 capsule 2    Etanercept 50 MG/ML SOAJ Inject 50 mg into the skin once a week 4 each 1    rosuvastatin (CRESTOR) 40 MG tablet Take 1 tablet by mouth daily 30 tablet 5    chlorthalidone (HYGROTON) 25 MG tablet Take 1 tablet by mouth daily 90 tablet 0    clopidogrel (PLAVIX) 75 MG tablet Take 1 tablet by mouth daily 90 tablet 0    amLODIPine (NORVASC) 5 MG tablet Take 1 tablet by mouth daily 90 tablet 0    metoprolol succinate (TOPROL XL) 100 MG extended release tablet TAKE 1 TABLET BY MOUTH EVERY DAY 90 tablet 0    DULoxetine (CYMBALTA) 60 MG extended release capsule Take 1 capsule by mouth daily      aspirin 81 MG chewable tablet CHEW AND SWALLOW 1 TABLET BY MOUTH DAILY 90 tablet 0    diclofenac sodium (VOLTAREN) 1 % GEL APPLY 4 GRAMS TO LOWER BACK FOUR TIMES DAILY AS NEEDED FOR PAIN      oxyCODONE-acetaminophen (PERCOCET)  MG per tablet TAKE 1 TABLET BY MOUTH EVERY 6 HOURS AS NEEDED      hydrOXYzine (VISTARIL) 50 MG capsule Take 50 mg by mouth 2 times daily as needed      albuterol sulfate HFA (VENTOLIN HFA) 108 (90 Base) MCG/ACT inhaler INHALE 2 PUFFS INTO THE LUNGS EVERY 6 HOURS AS NEEDED FOR WHEEZING OR SHORTNESS OF BREATH 3 Inhaler 3    azelastine (ASTELIN) 0.1 % nasal spray 2 sprays by Nasal route 2 times daily Use in each nostril as directed 3 Bottle 3    Cholecalciferol (VITAMIN D3) 50 MCG (2000 UT) CAPS Take 1 capsule by mouth daily 90 capsule 3    ranolazine (RANEXA) 1000 MG extended release tablet Take 1 tablet by mouth 2 times daily      zolpidem (AMBIEN) 5 MG tablet Take 5 mg by mouth nightly as needed.       mirtazapine (REMERON) 30 MG tablet Take 1 tablet by mouth nightly as needed (insomnia)      NARCAN 4 MG/0.1ML LIQD nasal spray as needed  0    Multiple Vitamins-Minerals (THERAPEUTIC MULTIVITAMIN-MINERALS) tablet Take 1 tablet by mouth daily      losartan (COZAAR) 50 MG tablet TAKE 1 TABLET BY MOUTH DAILY 90 tablet 0    fluticasone (FLONASE) 50 MCG/ACT nasal spray SHAKE LIQUID AND USE 2 SPRAYS IN EACH NOSTRIL DAILY (Patient not taking: Reported on 4/1/2022) 48 g 0    cetirizine (ZYRTEC) 10 MG tablet Take 1 tablet by mouth daily (Patient not taking: Reported on 4/1/2022) 90 tablet 1    nicotine (NICODERM CQ) 7 MG/24HR Place 1 patch onto the skin daily for 14 days 30 patch 0    diclofenac sodium 1 % GEL as needed (Patient not taking: Reported on 4/1/2022)  0     No current facility-administered medications for this visit. Patient'spast medical, surgical, social and/or family history reviewed, updated in chart, and are non-contributory (unless otherwise stated). Review of Systems  Review of Systems    PE:  VS:  /85   Pulse 58   Temp 97.3 °F (36.3 °C)   Ht 4' 11\" (1.499 m)   Wt 156 lb (70.8 kg)   SpO2 98%   BMI 31.51 kg/m²   Physical Exam  Constitutional:       Appearance: She is well-developed. HENT:      Head: Normocephalic and atraumatic. Comments: hoarseness  Cardiovascular:      Rate and Rhythm: Normal rate and regular rhythm. Heart sounds: No murmur heard. No friction rub. No gallop. Pulmonary:      Effort: Pulmonary effort is normal.      Breath sounds: Normal breath sounds. No wheezing or rales. Skin:     General: Skin is warm and dry. Neurological:      Mental Status: She is alert and oriented to person, place, and time. Assessment/Plan:  Tiffany was seen today for fatigue. Diagnoses and all orders for this visit:    Chronic fatigue  Untreated sleep apnea, dysthymia and polypharmacy and most likely largest contributing factors  - referral to sleep medicine. Apparently will take mask off during her sleep.  Gets claustrophobic wearing mass  Encouraged her to schedule with a counselor,also journaling/ reflection may be of benefit; multiple sedating medications due to h/o insomnia; encouraged her to discuss with her psychiatry provider whether or not medication doses could be reduced  Increase physical activity  Referred to stepping out exercise classes    Anhedonia  -     External Referral To Counseling Services    Essential hypertension  bp at goal on current medications  -     amLODIPine (NORVASC) 5 MG tablet; Take 1 tablet by mouth daily  -     chlorthalidone (HYGROTON) 25 MG tablet; Take 1 tablet by mouth daily  -     losartan (COZAAR) 50 MG tablet; Take 1 tablet by mouth daily  -     metoprolol succinate (TOPROL XL) 100 MG extended release tablet; TAKE 1 TABLET BY MOUTH EVERY DAY    CAD in native artery  stable  -     clopidogrel (PLAVIX) 75 MG tablet; Take 1 tablet by mouth daily  -     losartan (COZAAR) 50 MG tablet; Take 1 tablet by mouth daily  -     metoprolol succinate (TOPROL XL) 100 MG extended release tablet; TAKE 1 TABLET BY MOUTH EVERY DAY    OLIVIA (obstructive sleep apnea)  -     Consuelo Oreilly DO, Sleep Medicine, [place]    Encounter for screening mammogram for malignant neoplasm of breast  -     [facility] DIGITAL SCREEN BILATERAL PER PROTOCOL; Future    refills  -     dicyclomine (BENTYL) 10 MG capsule; TAKE 1 CAPSULE BY MOUTH FOUR TIMES A DAY BEFORE MEALS  -     fluticasone (FLONASE) 50 MCG/ACT nasal spray; SHAKE LIQUID AND USE 2 SPRAYS IN EACH NOSTRIL DAILY  -     albuterol sulfate HFA (VENTOLIN HFA) 108 (90 Base) MCG/ACT inhaler; INHALE 2 PUFFS INTO THE LUNGS EVERY 6 HOURS AS NEEDED FOR WHEEZING OR SHORTNESS OF BREATH  -     aspirin 81 MG chewable tablet; CHEW AND SWALLOW 1 TABLET BY MOUTH DAILY  -     azelastine (ASTELIN) 0.1 % nasal spray; 2 sprays by Nasal route 2 times daily Use in each nostril as directed  -     cetirizine (ZYRTEC) 10 MG tablet; Take 1 tablet by mouth daily  -     Cholecalciferol (VITAMIN D3) 50 MCG (2000 UT) CAPS; Take 1 capsule by mouth daily  -     Etanercept 50 MG/ML SOAJ; Inject 50 mg into the skin once a week  -     nicotine (NICODERM CQ) 7 MG/24HR; Place 1 patch onto the skin daily for 14 days  -     omeprazole (PRILOSEC) 40 MG delayed release capsule; Take 1 capsule by mouth Daily  -     rosuvastatin (CRESTOR) 40 MG tablet; Take 1 tablet by mouth daily  -     ranolazine (RANEXA) 1000 MG extended release tablet;  Take 1 tablet by mouth 2 times daily        Return in about 3 months (around 7/1/2022). Advised patient to call with any new medication issues. All questions answered.   Call or go to emergency department ifsymptoms worsen or persist.

## 2022-04-04 RX ORDER — FLUTICASONE PROPIONATE 50 MCG
SPRAY, SUSPENSION (ML) NASAL
Qty: 48 G | Refills: 3 | OUTPATIENT
Start: 2022-04-04

## 2022-04-29 RX ORDER — DICYCLOMINE HYDROCHLORIDE 10 MG/1
CAPSULE ORAL
Qty: 120 CAPSULE | Refills: 2 | Status: SHIPPED
Start: 2022-04-29 | End: 2022-07-06 | Stop reason: CLARIF

## 2022-06-07 RX ORDER — ETANERCEPT 50 MG/ML
SOLUTION SUBCUTANEOUS
Qty: 4 ML | OUTPATIENT
Start: 2022-06-07

## 2022-06-07 NOTE — TELEPHONE ENCOUNTER
Patient states she does not have an appointment yet. She had a lot of stuff going on recently and has been unable to schedule an appointment. She said she will call today and schedule, if you could please refill one more time.

## 2022-06-07 NOTE — TELEPHONE ENCOUNTER
Patient needs to see her rheumatologist. Last prescription was given as a bridge until she was able to get into be seen by rheum. Let me know if / when she has appointment scheduled. Thanks.

## 2022-06-07 NOTE — TELEPHONE ENCOUNTER
Left detailed message advising patient to schedule with Rheumatology and then call us with a date so we can refill.

## 2022-06-07 NOTE — TELEPHONE ENCOUNTER
Let me know when the appointment is scheduled first, then I can bridge until that appointment time. Thanks.

## 2022-06-14 NOTE — TELEPHONE ENCOUNTER
Appt with rheumatology is not until October 21st. Are you able to fill until then.      Meds pending your approval.

## 2022-06-14 NOTE — TELEPHONE ENCOUNTER
Patient was to call in with her appointment date for her rheumatologist; Oct 21. She was told that Dr. Sabra Meyers will now fill her prescription for Embrel 50 mg pre-filled at [facility].

## 2022-06-17 ENCOUNTER — HOSPITAL ENCOUNTER (OUTPATIENT)
Dept: GENERAL RADIOLOGY | Age: 62
Discharge: HOME | End: 2022-06-19
Payer: COMMERCIAL

## 2022-06-17 VITALS — WEIGHT: 160 LBS | HEIGHT: 59 IN | BODY MASS INDEX: 32.25 KG/M2

## 2022-06-17 DIAGNOSIS — Z12.31 ENCOUNTER FOR SCREENING MAMMOGRAM FOR MALIGNANT NEOPLASM OF BREAST: ICD-10-CM

## 2022-06-17 PROCEDURE — 77063 BREAST TOMOSYNTHESIS BI: CPT

## 2022-06-18 DIAGNOSIS — I25.10 CAD IN NATIVE ARTERY: Chronic | ICD-10-CM

## 2022-06-20 DIAGNOSIS — I10 ESSENTIAL HYPERTENSION: ICD-10-CM

## 2022-06-20 RX ORDER — AMLODIPINE BESYLATE 5 MG/1
5 TABLET ORAL DAILY
Qty: 90 TABLET | Refills: 0 | Status: SHIPPED
Start: 2022-06-20 | End: 2022-09-17 | Stop reason: SDUPTHER

## 2022-06-20 RX ORDER — ATORVASTATIN CALCIUM 80 MG/1
TABLET, FILM COATED ORAL
Qty: 90 TABLET | Refills: 0 | OUTPATIENT
Start: 2022-06-20

## 2022-07-06 ENCOUNTER — OFFICE VISIT (OUTPATIENT)
Dept: CARDIOLOGY CLINIC | Age: 62
End: 2022-07-06
Payer: COMMERCIAL

## 2022-07-06 VITALS
SYSTOLIC BLOOD PRESSURE: 123 MMHG | WEIGHT: 156 LBS | RESPIRATION RATE: 12 BRPM | HEIGHT: 59 IN | HEART RATE: 51 BPM | DIASTOLIC BLOOD PRESSURE: 71 MMHG | BODY MASS INDEX: 31.45 KG/M2

## 2022-07-06 DIAGNOSIS — R00.2 PALPITATIONS: ICD-10-CM

## 2022-07-06 DIAGNOSIS — I25.10 CAD IN NATIVE ARTERY: Primary | Chronic | ICD-10-CM

## 2022-07-06 PROCEDURE — 93000 ELECTROCARDIOGRAM COMPLETE: CPT | Performed by: INTERNAL MEDICINE

## 2022-07-06 PROCEDURE — 99213 OFFICE O/P EST LOW 20 MIN: CPT | Performed by: INTERNAL MEDICINE

## 2022-07-06 RX ORDER — TIZANIDINE 4 MG/1
TABLET ORAL
COMMUNITY
Start: 2022-06-22

## 2022-07-06 NOTE — PROGRESS NOTES
Chief Complaint   Patient presents with    Palpitations       Patient Active Problem List    Diagnosis Date Noted    Episode of recurrent major depressive disorder ([facility] [address] 02/07/2022    Positive depression screening 02/07/2022    Memory problem 02/07/2022    History of ankylosing spondylitis 12/29/2021    Palpitations 12/22/2021     Overview Note:     A. Monitor 1/2022: one ten beat SVT       Inflammatory spondylopathy of lumbosacral region [facility]) 12/04/2020    Obesity     Vertigo 03/21/2019    CAD in native artery      Overview Note:     A. \"ACS\" 2018 (RV):  \" severe sequential lesions small non dominant RCA\"  B.   Cath 9/25/20 [facility]):  \"70% small OM\"      Mixed hyperlipidemia 09/22/2018    OLIVIA (obstructive sleep apnea) 04/05/2018    HLA B27 (HLA B27 positive) 10/10/2017    Deformity of right foot 11/30/2016     Overview Note:     Dr Cleora Nyhan Pulmonary nodule 09/27/2016     Overview Note:     X 2 RLL per CT 9/2016  Needs repeat Chest CT March 2017 1/18 No further f/u needed per pulmonologist      Facet syndrome, lumbar 03/09/2016    Essential hypertension 11/11/2015    Diverticulosis of sigmoid colon     GERD (gastroesophageal reflux disease)     Anxiety and depression 09/11/2013       Current Outpatient Medications   Medication Sig Dispense Refill    tiZANidine (ZANAFLEX) 4 MG tablet TAKE 1 TABLET BY MOUTH THREE TIMES DAILY AS NEEDED      amLODIPine (NORVASC) 5 MG tablet TAKE 1 TABLET BY MOUTH DAILY 90 tablet 0    Etanercept 50 MG/ML SOAJ Inject 50 mg into the skin once a week 4 each 3    fluticasone (FLONASE) 50 MCG/ACT nasal spray SHAKE LIQUID AND USE 2 SPRAYS IN EACH NOSTRIL DAILY 48 g 3    albuterol sulfate HFA (VENTOLIN HFA) 108 (90 Base) MCG/ACT inhaler INHALE 2 PUFFS INTO THE LUNGS EVERY 6 HOURS AS NEEDED FOR WHEEZING OR SHORTNESS OF BREATH 1 each 2    aspirin 81 MG chewable tablet CHEW AND SWALLOW 1 TABLET BY MOUTH DAILY 90 tablet 0    azelastine (ASTELIN) 0.1 % nasal spray 2 sprays by Nasal route 2 times daily Use in each nostril as directed 3 each 3    cetirizine (ZYRTEC) 10 MG tablet Take 1 tablet by mouth daily 90 tablet 1    chlorthalidone (HYGROTON) 25 MG tablet Take 1 tablet by mouth daily 90 tablet 0    losartan (COZAAR) 50 MG tablet Take 1 tablet by mouth daily 90 tablet 0    metoprolol succinate (TOPROL XL) 100 MG extended release tablet TAKE 1 TABLET BY MOUTH EVERY DAY 90 tablet 0    rosuvastatin (CRESTOR) 40 MG tablet Take 1 tablet by mouth daily 30 tablet 3    ranolazine (RANEXA) 1000 MG extended release tablet TAKE 1 TABLET BY MOUTH TWICE DAILY 180 tablet 1    DULoxetine (CYMBALTA) 60 MG extended release capsule Take 1 capsule by mouth daily      diclofenac sodium (VOLTAREN) 1 % GEL APPLY 4 GRAMS TO LOWER BACK FOUR TIMES DAILY AS NEEDED FOR PAIN      oxyCODONE-acetaminophen (PERCOCET)  MG per tablet TAKE 1 TABLET BY MOUTH EVERY 6 HOURS AS NEEDED      hydrOXYzine (VISTARIL) 50 MG capsule Take 50 mg by mouth 2 times daily as needed      zolpidem (AMBIEN) 5 MG tablet Take 5 mg by mouth nightly as needed.  mirtazapine (REMERON) 30 MG tablet Take 1 tablet by mouth nightly as needed (insomnia)      NARCAN 4 MG/0.1ML LIQD nasal spray as needed  0    nicotine (NICODERM CQ) 7 MG/24HR Place 1 patch onto the skin daily for 14 days 30 patch 0    omeprazole (PRILOSEC) 40 MG delayed release capsule Take 1 capsule by mouth Daily 90 capsule 0     No current facility-administered medications for this visit.         No Known Allergies    Vitals:    07/06/22 1003   BP: 123/71   Pulse: 51   Resp: 12   Weight: 156 lb (70.8 kg)   Height: 4' 11\" (1.499 m)       Social History     Socioeconomic History    Marital status:      Spouse name: Not on file    Number of children: Not on file    Years of education: Not on file    Highest education level: Not on file   Occupational History    Occupation:      Occupation: disabled   Tobacco Use    Smoking status: Current Every Day Smoker     Packs/day: 0.50     Years: 40.00     Pack years: 20.00     Types: Cigarettes     Last attempt to quit: 4/10/2021     Years since quittin.2    Smokeless tobacco: Never Used    Tobacco comment: 22 down to .5 PPD/ started at age 21-quit three times previously   Vaping Use    Vaping Use: Never used   Substance and Sexual Activity    Alcohol use: No     Alcohol/week: 0.0 standard drinks    Drug use: No    Sexual activity: Not Currently     Partners: Male   Other Topics Concern    Not on file   Social History Narrative    Lives in a house with      Social Determinants of Health     Financial Resource Strain: Low Risk     Difficulty of Paying Living Expenses: Not hard at all   Food Insecurity: No Food Insecurity    Worried About Running Out of Food in the Last Year: Never true    Kip of Food in the Last Year: Never true   Transportation Needs:     Lack of Transportation (Medical): Not on file    Lack of Transportation (Non-Medical):  Not on file   Physical Activity:     Days of Exercise per Week: Not on file    Minutes of Exercise per Session: Not on file   Stress:     Feeling of Stress : Not on file   Social Connections:     Frequency of Communication with Friends and Family: Not on file    Frequency of Social Gatherings with Friends and Family: Not on file    Attends Sikh Services: Not on file    Active Member of [employer] or Organizations: Not on file    Attends Club or Organization Meetings: Not on file    Marital Status: Not on file   Intimate Partner Violence:     Fear of Current or Ex-Partner: Not on file    Emotionally Abused: Not on file    Physically Abused: Not on file    Sexually Abused: Not on file   Housing Stability:     Unable to Pay for Housing in the Last Year: Not on file    Number of [place] in the Last Year: Not on file    Unstable Housing in the Last Year: Not on file       Family History   Problem Relation Age of Onset    Arthritis Mother     High Blood Pressure Mother     Rheum Arthritis Mother     High Blood Pressure Father     Other Father         emphysema    Osteoarthritis Father     High Blood Pressure Brother     Depression Daughter     High Blood Pressure Brother     No Known Problems Son          SUBJECTIVE: Rosalie Magana presents to the office today for f/u. \"Cared for \" at [facility] in the past.. Hx of atypical compliants - cath in 2018 here for MINI troponin elevation - diseased non dominant RCA noted, no intervention. Another cath in 2020 at [facility] - branch vessel disease only - no intervention. Echo with normal LV function and no valve disease. Continued compliants must have lead to CMR interrogation by dr Orion Hernandez, with a diagnosis of \"atrial myocarditis\" called. She does all  Her own housework, and has 10 grandkids she plays with, but no exercise. Is on high intensity statin   No palpitations. Has lost 8 lbs. Physical Exam   /71   Pulse 51   Resp 12   Ht 4' 11\" (1.499 m)   Wt 156 lb (70.8 kg)   BMI 31.51 kg/m²   Constitutional: Oriented to person, place, and time. Well-developed and well-nourished. No distress. Head: Normocephalic and atraumatic. Eyes: EOM are normal. Pupils are equal, round, and reactive to light. Neck: Normal range of motion. Neck supple. No hepatojugular reflux and no JVD present. Carotid bruit is not present. Cardiovascular: Normal rate, regular rhythm, normal heart sounds and intact distal pulses. Exam reveals no gallop and no friction rub. No murmur heard. Pulmonary/Chest: Effort normal and breath sounds normal. No respiratory distress. No wheezes. No rales. Abdominal: Soft. Bowel sounds are normal. No distension and no mass. No tenderness. No rebound and no guarding. Musculoskeletal: Normal range of motion. No edema and no tenderness. Neurological: Alert and oriented to person, place, and time. Skin: Skin is warm and dry.  No rash noted. Not diaphoretic. No erythema. Psychiatric: Normal mood and affect. Behavior is normal.     EKG:  normal sinus rhythm, nonspecific ST and T waves changes, IRBBB, unchanged from previous tracings. ASSESSMENT AND PLAN:  Patient Active Problem List   Diagnosis    Anxiety and depression    Essential hypertension    Diverticulosis of sigmoid colon    GERD (gastroesophageal reflux disease)    Facet syndrome, lumbar    Pulmonary nodule    Deformity of right foot    HLA B27 (HLA B27 positive)    OLIVIA (obstructive sleep apnea)    Mixed hyperlipidemia    CAD in native artery    Vertigo    Obesity    Inflammatory spondylopathy of lumbosacral region ([address]    Palpitations    History of ankylosing spondylitis    Episode of recurrent major depressive disorder ([facility] [address]    Positive depression screening    Memory problem     Patient with minimal CAD with atypical chest pain leading to cardiac catheteriztions in the past - medical management only - would STOP ranolazine - no angina  Agree with high intensity statin  Cryptic hx of \"atrial myocarditis\", 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     OV one year              Arian Licona M.D  [facility] Cardiology

## 2022-07-11 DIAGNOSIS — I10 ESSENTIAL HYPERTENSION: ICD-10-CM

## 2022-07-12 RX ORDER — CHLORTHALIDONE 25 MG/1
25 TABLET ORAL DAILY
Qty: 90 TABLET | Refills: 0 | Status: SHIPPED
Start: 2022-07-12 | End: 2022-10-18

## 2022-07-14 RX ORDER — DICYCLOMINE HYDROCHLORIDE 10 MG/1
CAPSULE ORAL
Qty: 120 CAPSULE | Refills: 2 | OUTPATIENT
Start: 2022-07-14

## 2022-07-15 NOTE — TELEPHONE ENCOUNTER
Patient returned call advised her to check with cardiology to make sure she was to dc medication and to let us know if we do need to send it in

## 2022-07-27 RX ORDER — ROSUVASTATIN CALCIUM 40 MG/1
40 TABLET, COATED ORAL DAILY
Qty: 30 TABLET | Refills: 3 | Status: SHIPPED | OUTPATIENT
Start: 2022-07-27

## 2022-07-27 RX ORDER — ROSUVASTATIN CALCIUM 40 MG/1
40 TABLET, COATED ORAL DAILY
Qty: 90 TABLET | OUTPATIENT
Start: 2022-07-27

## 2022-09-16 DIAGNOSIS — I10 ESSENTIAL HYPERTENSION: ICD-10-CM

## 2022-09-16 DIAGNOSIS — I25.10 CAD IN NATIVE ARTERY: Chronic | ICD-10-CM

## 2022-09-16 RX ORDER — AMLODIPINE BESYLATE 5 MG/1
5 TABLET ORAL DAILY
Qty: 90 TABLET | Refills: 0 | OUTPATIENT
Start: 2022-09-16

## 2022-09-17 RX ORDER — OMEPRAZOLE 40 MG/1
40 CAPSULE, DELAYED RELEASE ORAL DAILY
Qty: 90 CAPSULE | Refills: 0 | Status: SHIPPED
Start: 2022-09-17 | End: 2022-10-05

## 2022-09-17 RX ORDER — METOPROLOL SUCCINATE 100 MG/1
TABLET, EXTENDED RELEASE ORAL
Qty: 90 TABLET | Refills: 0 | Status: SHIPPED
Start: 2022-09-17 | End: 2022-10-05

## 2022-09-17 RX ORDER — AMLODIPINE BESYLATE 5 MG/1
5 TABLET ORAL DAILY
Qty: 90 TABLET | Refills: 0 | Status: SHIPPED | OUTPATIENT
Start: 2022-09-17

## 2022-10-03 DIAGNOSIS — I10 ESSENTIAL HYPERTENSION: ICD-10-CM

## 2022-10-03 DIAGNOSIS — I25.10 CAD IN NATIVE ARTERY: Chronic | ICD-10-CM

## 2022-10-05 RX ORDER — OMEPRAZOLE 40 MG/1
40 CAPSULE, DELAYED RELEASE ORAL DAILY
Qty: 90 CAPSULE | Refills: 0 | Status: SHIPPED
Start: 2022-10-05 | End: 2022-11-07 | Stop reason: SDUPTHER

## 2022-10-05 RX ORDER — METOPROLOL SUCCINATE 100 MG/1
TABLET, EXTENDED RELEASE ORAL
Qty: 90 TABLET | Refills: 0 | Status: SHIPPED
Start: 2022-10-05 | End: 2022-11-07 | Stop reason: SDUPTHER

## 2022-10-16 DIAGNOSIS — I10 ESSENTIAL HYPERTENSION: ICD-10-CM

## 2022-10-18 RX ORDER — CHLORTHALIDONE 25 MG/1
25 TABLET ORAL DAILY
Qty: 90 TABLET | Refills: 0 | Status: SHIPPED | OUTPATIENT
Start: 2022-10-18

## 2022-10-27 DIAGNOSIS — I25.10 CAD IN NATIVE ARTERY: Chronic | ICD-10-CM

## 2022-10-27 DIAGNOSIS — I10 ESSENTIAL HYPERTENSION: ICD-10-CM

## 2022-11-01 RX ORDER — LOSARTAN POTASSIUM 50 MG/1
50 TABLET ORAL DAILY
Qty: 90 TABLET | Refills: 0 | Status: SHIPPED
Start: 2022-11-01 | End: 2022-11-07 | Stop reason: SDUPTHER

## 2022-11-03 ENCOUNTER — TELEPHONE (OUTPATIENT)
Dept: FAMILY MEDICINE CLINIC | Age: 62
End: 2022-11-03

## 2022-11-03 NOTE — TELEPHONE ENCOUNTER
Scripts were sent in Sept and Oct for 90 days to a [facility] in [place]. Called the local [facility], they were able to transfer them and will get ready for patient today.

## 2022-11-03 NOTE — TELEPHONE ENCOUNTER
----- Message from Ramsey Tierney sent at 11/3/2022 10:08 AM EDT -----  Subject: Refill Request    QUESTIONS  Name of Medication? chlorthalidone (HYGROTON) 25 MG tablet  Patient-reported dosage and instructions? 1 tablet daily  How many days do you have left? 0  Preferred Pharmacy? [id number]  Pharmacy phone number (if available)? [phone]  Additional Information for Provider? Tiffany has an appt scheduled on   11/7. She doesn't have any medication left.  ---------------------------------------------------------------------------  --------------,  Name of Medication? amLODIPine (NORVASC) 5 MG tablet  Patient-reported dosage and instructions? 1 tablet daily  How many days do you have left? 0  Preferred Pharmacy? [id number]  Pharmacy phone number (if available)? [phone]  Additional Information for Provider? Tiffany took her last tablet on 11/2.  ---------------------------------------------------------------------------  --------------  CALL BACK INFO  What is the best way for the office to contact you? OK to leave message on   voicemail  Preferred Call Back Phone Number? [phone]  ---------------------------------------------------------------------------  --------------  SCRIPT ANSWERS  Relationship to Patient?  Self

## 2022-11-06 ASSESSMENT — ENCOUNTER SYMPTOMS
CONSTIPATION: 0
SINUS PRESSURE: 0
WHEEZING: 0
ABDOMINAL PAIN: 0
SHORTNESS OF BREATH: 0
NAUSEA: 0
COUGH: 0
EYE PAIN: 0
PHOTOPHOBIA: 0
BACK PAIN: 0
RHINORRHEA: 0
VOMITING: 0
CHEST TIGHTNESS: 0

## 2022-11-06 NOTE — PROGRESS NOTES
[facility]  Department of Family Medicine  Family Medicine Residency Program      Patient: Mary Luna 63 y.o. female     Date of Service: 11/7/22        Chief complaint:   Chief Complaint   Patient presents with    Hypertension    3 Month Follow-Up    Medication Check         HISTORY OF PRESENTING ILLNESS     63 y.o. female is an established patient who with a PMHx of HTN, OLIVIA, inflammatory spondylopathy, MDD, presented to the clinic  for a follow-up appointment and medication refills . Her only complaint today is regarding numbness in her fingers/toes. She states that these are most evident in her hands and occur when she is driving. She also notes numbness in her fingers/hands when she wakes up in the morning. She has previously been diagnosed with carpel tunnel for which she was given wrist splints but she has not tried them. She states that she sees Pain Management who is considering trying injections. She also provides a list of labs that she was intrusted to have ordered by her Rheumatologist. However, her Rheumatologist is no longer in network so she asks that we order them today. She is also requesting a consult for a Rheumatologist who is closer to her home and in network.         Health Maintenance:  Health Maintenance Due   Topic Date Due    Shingles vaccine (1 of 2) Never done    Low dose CT lung screening  12/27/2020    COVID-19 Vaccine (3 - Booster for Pfizer series) 07/08/2021    A1C test (Diabetic or Prediabetic)  08/13/2021    Annual Wellness Visit (AWV)  04/01/2022         Past Medical History:      Diagnosis Date    Anxiety and depression 9/11/2013    Baker's cyst, ruptured     CAD (coronary artery disease)     Chronic back pain     ankylosing spondylitis , RA    Chronic myocarditis ([facility] 12/1/2016    Diverticulosis of sigmoid colon     Duodenal ulcer     Facet arthropathy     Fibrocystic breast changes     Fracture of metatarsal bone     RIGHT FOOT    Genital warts     GERD (gastroesophageal reflux disease)     Heart attack ([facility] [address] 09/15/2018    History of blood transfusion     loss of blood during pregnancy    History of cervical dysplasia 12/16/2013    Hyperlipidemia     Hypertension     IGT (impaired glucose tolerance) 11/11/2015    Myocarditis ([facility]     Dr. Corrinne Latus    Obesity     OLIVIA (obstructive sleep apnea)     CPAP    Osteoarthritis of multiple joints 8/13/2013    Osteomyelitis ([facility]     Primary insomnia 11/11/2015    Protruded cervical disc/DDD with neural foraminal stenosis 6/10/2015    Pulmonary nodule 9/27/2016    Sacroiliitis ([facility] 6/2/2014    Shingles     Tobacco abuse     Valvular heart disease 11/30/2016    Per cardiac MRI 11/8/16-Mild TR/Mild AI Follows with [facility] Dr Corrinne Latus         Past Surgical History:        Procedure Laterality Date    BUNIONECTOMY  great toe    CARDIAC CATHETERIZATION  09/24/2018    COLONOSCOPY  8/20/12    Dr. Samuel Mihsra  11/2016    Dr Nain Rucker great toe/skin    [facility], TOTAL ABDOMINAL (CERVIX REMOVED)  2001    elective due to abnormal cells    JOINT REPLACEMENT Right     Knee    KNEE SURGERY Bilateral     arthroscopy    NERVE BLOCK  07/16/14    right sacroiliac joint #1    NERVE BLOCK Right 11/3/14    SI injection #2    NERVE BLOCK Right 11 19 14    si inj #3    NERVE BLOCK Bilateral 4/22/2015    greater occipital nerve block  #1    NERVE BLOCK Bilateral 5/6/15    greater occipital nerve block #2    NERVE BLOCK Bilateral 05/13/15    greater occipital nerve block #3    NERVE BLOCK Right 6/10/15    cervical transforaminal #1    NERVE BLOCK      Pain Management    NERVE BLOCK Right 1/18/2016    right hip injection  #1    NERVE BLOCK Right 1/25/16    hip injection #2    NERVE BLOCK  2/17/15    bilateral occipital     NERVE BLOCK Left 08/15/2016    genicular #1    NERVE BLOCK  09/07/2016    left genicular nerve block #2    NERVE BLOCK Right 12/07/2016    hip injection #2    NERVE BLOCK Right 2017    hip #1    NERVE BLOCK Right 2017    hip #2    OTHER SURGICAL HISTORY Right 2 9 15    lumbar radiofrequency    OTHER SURGICAL HISTORY Right 3/9/16    lumbar radiofrequency    OTHER SURGICAL HISTORY Right 2016    right hip injection #1    OTHER SURGICAL HISTORY Left 2016    genicular radiofrequency    ROTATOR CUFF REPAIR   at 42 years    Dr Gini Wilcox Left 14 at 48years old    Dr. Irma Davalos  12         Allergies:    Patient has no known allergies.       Social History:   Social History     Socioeconomic History    Marital status:      Spouse name: Not on file    Number of children: Not on file    Years of education: Not on file    Highest education level: Not on file   Occupational History    Occupation:      Occupation: disabled   Tobacco Use    Smoking status: Every Day     Packs/day: 0.50     Years: 40.00     Pack years: 20.00     Types: Cigarettes     Last attempt to quit: 4/10/2021     Years since quittin.5    Smokeless tobacco: Never    Tobacco comments:     20 down to .5 PPD/ started at age 21-quit three times previously   Vaping Use    Vaping Use: Never used   Substance and Sexual Activity    Alcohol use: No     Alcohol/week: 0.0 standard drinks    Drug use: No    Sexual activity: Not Currently     Partners: Male   Other Topics Concern    Not on file   Social History Narrative    Lives in a house with      Social Determinants of Health     Financial Resource Strain: Low Risk     Difficulty of Paying Living Expenses: Not hard at all   Food Insecurity: No Food Insecurity    Worried About Running Out of Food in the Last Year: Never true    Ran Out of Food in the Last Year: Never true   Transportation Needs: Not on file   Physical Activity: Not on file   Stress: Not on file   Social Connections: Not on file   Intimate Partner Violence: Not on file   Housing Stability: Not on file          Family History:       Problem Relation Age of Onset    Arthritis Mother     High Blood Pressure Mother     Rheum Arthritis Mother     High Blood Pressure Father     Other Father         emphysema    Osteoarthritis Father     High Blood Pressure Brother     Depression Daughter     High Blood Pressure Brother     No Known Problems Son            Review of Systems:   Review of Systems   Constitutional:  Negative for chills, fever and unexpected weight change. HENT:  Negative for congestion, postnasal drip, rhinorrhea and sinus pressure. Eyes:  Negative for photophobia, pain and visual disturbance. Respiratory:  Negative for cough, chest tightness, shortness of breath and wheezing. Cardiovascular:  Negative for chest pain and palpitations. Gastrointestinal:  Positive for diarrhea. Negative for abdominal pain, constipation, nausea and vomiting. Genitourinary:  Negative for frequency, hematuria, urgency, vaginal bleeding and vaginal discharge. Musculoskeletal:  Positive for arthralgias and myalgias. Negative for back pain, joint swelling and neck pain. Skin:  Negative for rash and wound. Neurological:  Positive for numbness. Negative for dizziness, weakness and headaches. Psychiatric/Behavioral:  Negative for dysphoric mood. The patient is not nervous/anxious. PHYSICAL EXAM   Vitals: /72 Comment: Taken after Pt was sitting for 15+ minutes  Pulse 57   Temp 97 °F (36.1 °C) (Temporal)   Resp 16   Ht 4' 11\" (1.499 m)   Wt 148 lb (67.1 kg)   SpO2 97%   BMI 29.89 kg/m²     Physical Exam  Vitals reviewed. Constitutional:       General: She is not in acute distress. Appearance: Normal appearance. HENT:      Head: Normocephalic and atraumatic. Nose: Nose normal. No congestion or rhinorrhea. Eyes:      General:         Right eye: No discharge.          Left eye: No discharge. Conjunctiva/sclera: Conjunctivae normal.   Cardiovascular:      Rate and Rhythm: Regular rhythm. Bradycardia present. Heart sounds: Normal heart sounds. No murmur heard. Pulmonary:      Effort: Pulmonary effort is normal.      Breath sounds: Normal breath sounds. No wheezing. Abdominal:      General: Abdomen is flat. There is no distension. Palpations: Abdomen is soft. Tenderness: There is no abdominal tenderness. Musculoskeletal:         General: Deformity (Fingers at various joints bilaterally) present. No swelling. Normal range of motion. Cervical back: Normal range of motion and neck supple. Lymphadenopathy:      Cervical: No cervical adenopathy. Skin:     General: Skin is warm and dry. Findings: No rash. Neurological:      General: No focal deficit present. Mental Status: She is alert. Motor: No weakness. Psychiatric:         Mood and Affect: Mood normal.         Behavior: Behavior normal.           ASSESSMENT AND PLAN     1. Essential hypertension  - Medications and updated lab work ordered. BP well controlled at this time.  - LIPID PANEL; Future  - Magnesium; Future  - Comprehensive Metabolic Panel; Future  - amLODIPine (NORVASC) 5 MG tablet; Take 1 tablet by mouth daily  Dispense: 90 tablet; Refill: 0  - chlorthalidone (HYGROTON) 25 MG tablet; Take 1 tablet by mouth daily  Dispense: 90 tablet; Refill: 0  - losartan (COZAAR) 50 MG tablet; Take 1 tablet by mouth daily  Dispense: 90 tablet; Refill: 0  - metoprolol succinate (TOPROL XL) 100 MG extended release tablet; Take 1 tablet by mouth daily  Dispense: 90 tablet; Refill: 0    2. CAD in native artery  - losartan (COZAAR) 50 MG tablet; Take 1 tablet by mouth daily  Dispense: 90 tablet; Refill: 0  - metoprolol succinate (TOPROL XL) 100 MG extended release tablet; Take 1 tablet by mouth daily  Dispense: 90 tablet; Refill: 0    3.  History of ankylosing spondylitis  - Consult placed so Pt may establish with a Rheumatologist closer to home who takes her insurance  - [id number], Rheumatology, [place]  - Sedimentation Rate; Future  - C-Reactive Protein; Future  - RHEUMATOID FACTOR; Future  - CBC with Auto Differential; Future    4. Gastroesophageal reflux disease, unspecified whether esophagitis present  - omeprazole (PRILOSEC) 40 MG delayed release capsule; Take 1 capsule by mouth Daily  Dispense: 90 capsule; Refill: 0    Follow-up: 1 month to review lab work and discuss numbness in hands/feet        Counseled regarding above diagnosis, including possible risks and complications, especially if left uncontrolled. Counseled regarding the possible side effects, risks, benefits and alternatives to treatment; patient and/or guardian verbalizes understanding, agrees, feels comfortable with and wishes to proceed with above treatment plan. Call or go to ED immediately if symptoms worsen or persist. Advised patient to call with any new medication issues, and, as applicable, read all Rx info from pharmacy to assure aware of all possible risks and side effects of medication before taking. Patient and/or guardian given opportunity to ask questions/raise concerns. The patient verbalized comfort and understanding of instructions. I encourage further reading and education about your health conditions. Information on many health conditions is provided by the American Academy of Family [employer]icians: https://familydoctor. org/  Please bring any questions to me at your next visit.     Medication List:    Current Outpatient Medications   Medication Sig Dispense Refill    amLODIPine (NORVASC) 5 MG tablet Take 1 tablet by mouth daily 90 tablet 0    aspirin 81 MG chewable tablet CHEW AND SWALLOW 1 TABLET BY MOUTH DAILY 90 tablet 0    chlorthalidone (HYGROTON) 25 MG tablet Take 1 tablet by mouth daily 90 tablet 0    losartan (COZAAR) 50 MG tablet Take 1 tablet by mouth daily 90 tablet 0    metoprolol succinate (TOPROL XL) 100 MG extended release tablet Take 1 tablet by mouth daily 90 tablet 0    omeprazole (PRILOSEC) 40 MG delayed release capsule Take 1 capsule by mouth Daily 90 capsule 0    tiZANidine (ZANAFLEX) 4 MG tablet TAKE 1 TABLET BY MOUTH THREE TIMES DAILY AS NEEDED      albuterol sulfate HFA (VENTOLIN HFA) 108 (90 Base) MCG/ACT inhaler INHALE 2 PUFFS INTO THE LUNGS EVERY 6 HOURS AS NEEDED FOR WHEEZING OR SHORTNESS OF BREATH 1 each 2    azelastine (ASTELIN) 0.1 % nasal spray 2 sprays by Nasal route 2 times daily Use in each nostril as directed 3 each 3    cetirizine (ZYRTEC) 10 MG tablet Take 1 tablet by mouth daily 90 tablet 1    oxyCODONE-acetaminophen (PERCOCET)  MG per tablet TAKE 1 TABLET BY MOUTH EVERY 6 HOURS AS NEEDED      zolpidem (AMBIEN) 5 MG tablet Take 5 mg by mouth nightly as needed. hydrOXYzine HCl (ATARAX) 50 MG tablet       lamoTRIgine (LAMICTAL) 100 MG tablet  (Patient not taking: Reported on 11/7/2022)      LORazepam (ATIVAN) 0.5 MG tablet  (Patient not taking: Reported on 11/7/2022)      ENBREL 50 MG/ML injection INJECT 1 ML UNDER THE SKIN ONCE WEEKLY. USE ENTIRE CONTENTS OF SYRINGE      rosuvastatin (CRESTOR) 40 MG tablet Take 1 tablet by mouth in the morning.  (Patient not taking: Reported on 11/7/2022) 30 tablet 3    Etanercept 50 MG/ML SOAJ Inject 50 mg into the skin once a week 4 each 3    fluticasone (FLONASE) 50 MCG/ACT nasal spray SHAKE LIQUID AND USE 2 SPRAYS IN EACH NOSTRIL DAILY 48 g 3    nicotine (NICODERM CQ) 7 MG/24HR Place 1 patch onto the skin daily for 14 days 30 patch 0    ranolazine (RANEXA) 1000 MG extended release tablet TAKE 1 TABLET BY MOUTH TWICE DAILY (Patient not taking: Reported on 11/7/2022) 180 tablet 1    DULoxetine (CYMBALTA) 60 MG extended release capsule Take 1 capsule by mouth daily      diclofenac sodium (VOLTAREN) 1 % GEL APPLY 4 GRAMS TO LOWER BACK FOUR TIMES DAILY AS NEEDED FOR PAIN      hydrOXYzine (VISTARIL) 50 MG capsule Take 50 mg by mouth 2 times daily as needed      mirtazapine (REMERON) 30 MG tablet Take 1 tablet by mouth nightly as needed (insomnia) (Patient not taking: Reported on 11/7/2022)      NARCAN 4 MG/0.1ML LIQD nasal spray as needed (Patient not taking: Reported on 11/7/2022)  0     No current facility-administered medications for this visit. Return to Office: Return in about 1 month (around 12/7/2022) for Hand and foot numbness. This document may have been prepared at least partially through the use of voice recognition software. Although effort is taken to assure the accuracy of this document, it is possible that grammatical, syntax,  or spelling errors may occur.     Akhil Quijano MD

## 2022-11-07 ENCOUNTER — OFFICE VISIT (OUTPATIENT)
Dept: FAMILY MEDICINE CLINIC | Age: 62
End: 2022-11-07
Payer: COMMERCIAL

## 2022-11-07 VITALS
WEIGHT: 148 LBS | OXYGEN SATURATION: 97 % | DIASTOLIC BLOOD PRESSURE: 72 MMHG | BODY MASS INDEX: 29.84 KG/M2 | RESPIRATION RATE: 16 BRPM | TEMPERATURE: 97 F | HEIGHT: 59 IN | HEART RATE: 57 BPM | SYSTOLIC BLOOD PRESSURE: 116 MMHG

## 2022-11-07 DIAGNOSIS — Z87.39 HISTORY OF ANKYLOSING SPONDYLITIS: ICD-10-CM

## 2022-11-07 DIAGNOSIS — I25.10 CAD IN NATIVE ARTERY: Chronic | ICD-10-CM

## 2022-11-07 DIAGNOSIS — K21.9 GASTROESOPHAGEAL REFLUX DISEASE, UNSPECIFIED WHETHER ESOPHAGITIS PRESENT: ICD-10-CM

## 2022-11-07 DIAGNOSIS — I10 ESSENTIAL HYPERTENSION: Primary | ICD-10-CM

## 2022-11-07 LAB
BASOPHILS ABSOLUTE: 0.13 E9/L (ref 0–0.2)
BASOPHILS RELATIVE PERCENT: 1.1 % (ref 0–2)
C-REACTIVE PROTEIN: 0.6 MG/DL (ref 0–0.4)
EOSINOPHILS ABSOLUTE: 0.16 E9/L (ref 0.05–0.5)
EOSINOPHILS RELATIVE PERCENT: 1.3 % (ref 0–6)
HCT VFR BLD CALC: 48.9 % (ref 34–48)
HEMOGLOBIN: 16.2 G/DL (ref 11.5–15.5)
IMMATURE GRANULOCYTES #: 0.03 E9/L
IMMATURE GRANULOCYTES %: 0.2 % (ref 0–5)
LYMPHOCYTES ABSOLUTE: 3.39 E9/L (ref 1.5–4)
LYMPHOCYTES RELATIVE PERCENT: 27.6 % (ref 20–42)
MCH RBC QN AUTO: 31.5 PG (ref 26–35)
MCHC RBC AUTO-ENTMCNC: 33.1 % (ref 32–34.5)
MCV RBC AUTO: 95.1 FL (ref 80–99.9)
MONOCYTES ABSOLUTE: 0.99 E9/L (ref 0.1–0.95)
MONOCYTES RELATIVE PERCENT: 8.1 % (ref 2–12)
NEUTROPHILS ABSOLUTE: 7.57 E9/L (ref 1.8–7.3)
NEUTROPHILS RELATIVE PERCENT: 61.7 % (ref 43–80)
PDW BLD-RTO: 14.6 FL (ref 11.5–15)
PLATELET # BLD: 316 E9/L (ref 130–450)
PMV BLD AUTO: 11.8 FL (ref 7–12)
RBC # BLD: 5.14 E12/L (ref 3.5–5.5)
RHEUMATOID FACTOR: <10 IU/ML (ref 0–13)
SEDIMENTATION RATE, ERYTHROCYTE: 6 MM/HR (ref 0–20)
WBC # BLD: 12.3 E9/L (ref 4.5–11.5)

## 2022-11-07 PROCEDURE — 99214 OFFICE O/P EST MOD 30 MIN: CPT | Performed by: FAMILY MEDICINE

## 2022-11-07 PROCEDURE — G0008 ADMIN INFLUENZA VIRUS VAC: HCPCS | Performed by: FAMILY MEDICINE

## 2022-11-07 PROCEDURE — 3074F SYST BP LT 130 MM HG: CPT | Performed by: FAMILY MEDICINE

## 2022-11-07 PROCEDURE — 3078F DIAST BP <80 MM HG: CPT | Performed by: FAMILY MEDICINE

## 2022-11-07 PROCEDURE — 90674 CCIIV4 VAC NO PRSV 0.5 ML IM: CPT | Performed by: FAMILY MEDICINE

## 2022-11-07 RX ORDER — AMLODIPINE BESYLATE 5 MG/1
5 TABLET ORAL DAILY
Qty: 90 TABLET | Refills: 0 | Status: SHIPPED | OUTPATIENT
Start: 2022-11-07

## 2022-11-07 RX ORDER — ETANERCEPT 50 MG/ML
SOLUTION SUBCUTANEOUS
COMMUNITY
Start: 2022-10-27

## 2022-11-07 RX ORDER — LOSARTAN POTASSIUM 50 MG/1
50 TABLET ORAL DAILY
Qty: 90 TABLET | Refills: 0 | Status: SHIPPED | OUTPATIENT
Start: 2022-11-07

## 2022-11-07 RX ORDER — ASPIRIN 81 MG/1
TABLET, CHEWABLE ORAL
Qty: 90 TABLET | Refills: 0 | Status: SHIPPED | OUTPATIENT
Start: 2022-11-07

## 2022-11-07 RX ORDER — CHLORTHALIDONE 25 MG/1
25 TABLET ORAL DAILY
Qty: 90 TABLET | Refills: 0 | Status: SHIPPED | OUTPATIENT
Start: 2022-11-07

## 2022-11-07 RX ORDER — METOPROLOL SUCCINATE 100 MG/1
100 TABLET, EXTENDED RELEASE ORAL DAILY
Qty: 90 TABLET | Refills: 0 | Status: SHIPPED | OUTPATIENT
Start: 2022-11-07

## 2022-11-07 RX ORDER — LAMOTRIGINE 100 MG/1
TABLET ORAL
COMMUNITY
Start: 2022-10-13

## 2022-11-07 RX ORDER — HYDROXYZINE 50 MG/1
TABLET, FILM COATED ORAL
COMMUNITY
Start: 2022-10-13

## 2022-11-07 RX ORDER — LORAZEPAM 0.5 MG/1
TABLET ORAL
COMMUNITY
Start: 2022-10-06

## 2022-11-07 RX ORDER — OMEPRAZOLE 40 MG/1
40 CAPSULE, DELAYED RELEASE ORAL DAILY
Qty: 90 CAPSULE | Refills: 0 | Status: SHIPPED | OUTPATIENT
Start: 2022-11-07 | End: 2023-02-05

## 2022-11-07 ASSESSMENT — ENCOUNTER SYMPTOMS: DIARRHEA: 1

## 2022-11-07 NOTE — PROGRESS NOTES
S: 63 y.o. female with   Chief Complaint   Patient presents with    Hypertension    3 Month Follow-Up    Medication Check       Pt is here for follow up and med refill. She has no concerns or complaints today. Wants refills on her meds. She is continuing to follow with Dr. Stefania Marie for her anginal pain. O: VS:  height is 4' 11\" (1.499 m) and weight is 148 lb (67.1 kg). Her temporal temperature is 97 °F (36.1 °C). Her blood pressure is 99/73 and her pulse is 57. Her respiration is 16 and oxygen saturation is 97%. BP Readings from Last 3 Encounters:   11/07/22 99/73   07/06/22 123/71   04/01/22 116/85     See resident note    Impression/Plan:   1) HTN - refill meds. Well controlled  2) CAD - well controlled. Refill meds. Labs ordered  3) hx of ankylosing spondylitis - labs ordered. Pt referred to rheum  4) prev - flu vaccine. Health Maintenance Due   Topic Date Due    Shingles vaccine (1 of 2) Never done    Low dose CT lung screening  12/27/2020    COVID-19 Vaccine (3 - Booster for Pfizer series) 07/08/2021    A1C test (Diabetic or Prediabetic)  08/13/2021    Annual Wellness Visit (AWV)  04/01/2022    Flu vaccine (1) 08/01/2022         Attending Physician Statement  I have discussed the case, including pertinent history and exam findings with the resident. I also have seen the patient and performed key portions of the examination. I agree with the documented assessment and plan.       Thane Kanner, MD

## 2022-11-21 ENCOUNTER — TELEPHONE (OUTPATIENT)
Dept: FAMILY MEDICINE CLINIC | Age: 62
End: 2022-11-21

## 2022-11-22 NOTE — TELEPHONE ENCOUNTER
Reviewing her chart it appears not all the tests were done (lipid, cmp etc)  She does have some evidence of inflammation  CBC shows some polycythemia - elevated hemoglobin/ hemoatocrit - this is most likely due to smoking.      Keep appointment in December to review further - may need additional lab draw for remaining tests

## 2022-12-19 ENCOUNTER — OFFICE VISIT (OUTPATIENT)
Dept: FAMILY MEDICINE CLINIC | Age: 62
End: 2022-12-19

## 2022-12-19 VITALS
TEMPERATURE: 98 F | HEIGHT: 59 IN | WEIGHT: 147 LBS | BODY MASS INDEX: 29.64 KG/M2 | RESPIRATION RATE: 16 BRPM | HEART RATE: 71 BPM | SYSTOLIC BLOOD PRESSURE: 111 MMHG | DIASTOLIC BLOOD PRESSURE: 65 MMHG

## 2022-12-19 DIAGNOSIS — F17.210 CIGARETTE NICOTINE DEPENDENCE WITHOUT COMPLICATION: ICD-10-CM

## 2022-12-19 DIAGNOSIS — R20.0 NUMBNESS AND TINGLING IN LEFT HAND: Primary | ICD-10-CM

## 2022-12-19 DIAGNOSIS — R20.2 NUMBNESS AND TINGLING IN LEFT HAND: Primary | ICD-10-CM

## 2022-12-19 RX ORDER — NICOTINE 21 MG/24HR
1 PATCH, TRANSDERMAL 24 HOURS TRANSDERMAL DAILY
Qty: 42 PATCH | Refills: 0 | Status: SHIPPED | OUTPATIENT
Start: 2022-12-19 | End: 2023-01-30

## 2022-12-19 SDOH — ECONOMIC STABILITY: FOOD INSECURITY: WITHIN THE PAST 12 MONTHS, THE FOOD YOU BOUGHT JUST DIDN'T LAST AND YOU DIDN'T HAVE MONEY TO GET MORE.: SOMETIMES TRUE

## 2022-12-19 SDOH — ECONOMIC STABILITY: FOOD INSECURITY: WITHIN THE PAST 12 MONTHS, YOU WORRIED THAT YOUR FOOD WOULD RUN OUT BEFORE YOU GOT MONEY TO BUY MORE.: SOMETIMES TRUE

## 2022-12-19 ASSESSMENT — SOCIAL DETERMINANTS OF HEALTH (SDOH): HOW HARD IS IT FOR YOU TO PAY FOR THE VERY BASICS LIKE FOOD, HOUSING, MEDICAL CARE, AND HEATING?: SOMEWHAT HARD

## 2022-12-19 NOTE — PROGRESS NOTES
[facility]  Department of Family Medicine  Family Medicine Residency Program      Patient: Ector Curry 63 y.o. female     Date of Service: 12/19/22      Chief complaint:   Chief Complaint   Patient presents with    Hypertension           Hand Pain     Bilateral hand pain, still no better       HISTORY OF PRESENTING ILLNESS     63 y.o. female presented to the clinic with complains of numbness and tingling in her hands    -This is a chronic issue.  -She states that her symptoms are worse when she is driving.  -She has been diagnosed with carpal tunnel before however has not had an EMG  -She states that she uses her wrist splints but does not feel like it is helping.  -She will be seeing pain management this week and will discuss steroid injections.  -He states that when she shakes her hands in the air, her symptoms improve.  -Patient states that her mother has severe RA    Diarrhea  -Reports to on and off episodes of diarrhea  -Occurs a few times a month  -She state that sometimes she has to rush to the bathroom, has had accidents before.  -Describes the diarrhea as sticky in appearance, loose  -States that she has abdominal pain at times which is relieved with diarrhea   -She has also been avoiding fatty foods   -She states that her stools are soft otherwise.  -She sometimes uses Imodium to help with her symptoms  -She states that on 2 occasions she woke up after soiling herself.  -She has noticed that these episodes are triggered by peanut butter and eggs.       Health Maintenance:  Health Maintenance Due   Topic Date Due    Shingles vaccine (1 of 2) Never done    Low dose CT lung screening  12/27/2020    COVID-19 Vaccine (3 - Booster for Pfizer series) 07/08/2021    A1C test (Diabetic or Prediabetic)  08/13/2021     Past Medical History:      Diagnosis Date    Anxiety and depression 9/11/2013    Baker's cyst, ruptured     CAD (coronary artery disease)     Chronic back pain ankylosing spondylitis , RA    Chronic myocarditis ([facility] [address] 12/1/2016    Diverticulosis of sigmoid colon     Duodenal ulcer     Facet arthropathy     Fibrocystic breast changes     Fracture of metatarsal bone     RIGHT FOOT    Genital warts     GERD (gastroesophageal reflux disease)     Heart attack ([address] 09/15/2018    History of blood transfusion     loss of blood during pregnancy    History of cervical dysplasia 12/16/2013    Hyperlipidemia     Hypertension     IGT (impaired glucose tolerance) 11/11/2015    Myocarditis ([facility] [address]     Dr. Camila Preciado    Obesity     OLIVIA (obstructive sleep apnea)     CPAP    Osteoarthritis of multiple joints 8/13/2013    Osteomyelitis ([facility] [address]     Primary insomnia 11/11/2015    Protruded cervical disc/DDD with neural foraminal stenosis 6/10/2015    Pulmonary nodule 9/27/2016    Sacroiliitis ([facility] [address] 6/2/2014    Shingles     Tobacco abuse     Valvular heart disease 11/30/2016    Per cardiac MRI 11/8/16-Mild TR/Mild AI Follows with [facility] Dr Camila Preciado     Past Surgical History:        Procedure Laterality Date    BUNIONECTOMY  great toe    CARDIAC CATHETERIZATION  09/24/2018    COLONOSCOPY  8/20/12    Dr. Meg Gross  11/2016    Dr Urvashi No great toe/skin    [facility], TOTAL ABDOMINAL (CERVIX REMOVED)  2001    elective due to abnormal cells    JOINT REPLACEMENT Right     Knee    KNEE SURGERY Bilateral     arthroscopy    NERVE BLOCK  07/16/14    right sacroiliac joint #1    NERVE BLOCK Right 11/3/14    SI injection #2    NERVE BLOCK Right 11 19 14    si inj #3    NERVE BLOCK Bilateral 4/22/2015    greater occipital nerve block  #1    NERVE BLOCK Bilateral 5/6/15    greater occipital nerve block #2    NERVE BLOCK Bilateral 05/13/15    greater occipital nerve block #3    NERVE BLOCK Right 6/10/15    cervical transforaminal #1    NERVE BLOCK      Pain Management    NERVE BLOCK Right 1/18/2016    right hip injection  #1    NERVE BLOCK Right 16    hip injection #2    NERVE BLOCK  2/17/15    bilateral occipital     NERVE BLOCK Left 08/15/2016    genicular #1    NERVE BLOCK  2016    left genicular nerve block #2    NERVE BLOCK Right 2016    hip injection #2    NERVE BLOCK Right 2017    hip #1    NERVE BLOCK Right 2017    hip #2    OTHER SURGICAL HISTORY Right  15    lumbar radiofrequency    OTHER SURGICAL HISTORY Right 3/9/16    lumbar radiofrequency    OTHER SURGICAL HISTORY Right 2016    right hip injection #1    OTHER SURGICAL HISTORY Left 2016    genicular radiofrequency    [address] at 42 years    Dr Lali Elizondo Left 14 at 48years old    Dr. Yesenia Hernández  12     Allergies:    Patient has no known allergies.   Social History:   Social History     Socioeconomic History    Marital status:      Spouse name: Not on file    Number of children: Not on file    Years of education: Not on file    Highest education level: Not on file   Occupational History    Occupation:      Occupation: disabled   Tobacco Use    Smoking status: Every Day     Packs/day: 0.50     Years: 40.00     Pack years: 20.00     Types: Cigarettes     Last attempt to quit: 4/10/2021     Years since quittin.6    Smokeless tobacco: Never    Tobacco comments:     20 down to .5 PPD/ started at age 21-quit three times previously   Vaping Use    Vaping Use: Never used   Substance and Sexual Activity    Alcohol use: No     Alcohol/week: 0.0 standard drinks    Drug use: No    Sexual activity: Not Currently     Partners: Male   Other Topics Concern    Not on file   Social History Narrative    Lives in a house with      Social Determinants of Health     Financial Resource Strain: Medium Risk    Difficulty of Paying Living Expenses: Somewhat hard   Food Insecurity: Food Insecurity Present    Worried About Running Out of Food in the Last Year: Sometimes true    Ran Out of Food in the Last Year: Sometimes true   Transportation Needs: Not on file   Physical Activity: Not on file   Stress: Not on file   Social Connections: Not on file   Intimate Partner Violence: Not on file   Housing Stability: Not on file      Family History:       Problem Relation Age of Onset    Arthritis Mother     High Blood Pressure Mother     Rheum Arthritis Mother     High Blood Pressure Father     Other Father         emphysema    Osteoarthritis Father     High Blood Pressure Brother     Depression Daughter     High Blood Pressure Brother     No Known Problems Son      Review of Systems:   Review of Systems   Constitutional:  Negative for activity change, chills and fever. HENT:  Negative for rhinorrhea, sneezing and sore throat. Eyes:  Negative for redness. Respiratory:  Negative for cough and chest tightness. Cardiovascular:  Negative for chest pain, palpitations and leg swelling. Gastrointestinal:  Positive for abdominal pain and diarrhea. Negative for nausea and vomiting. Genitourinary:  Negative for decreased urine volume, dysuria, flank pain, frequency, hematuria and urgency. Musculoskeletal:  Negative for arthralgias and myalgias. Neurological:  Negative for dizziness, syncope, weakness and headaches. Psychiatric/Behavioral:  Negative for agitation. The patient is not nervous/anxious. PHYSICAL EXAM   Vitals: /65   Pulse 71   Temp 98 °F (36.7 °C) (Temporal)   Resp 16   Ht 4' 11\" (1.499 m)   Wt 147 lb (66.7 kg)   BMI 29.69 kg/m²   Physical Exam  Constitutional:       General: She is not in acute distress. Appearance: Normal appearance. She is not ill-appearing. Cardiovascular:      Rate and Rhythm: Normal rate and regular rhythm. Pulses: Normal pulses. Heart sounds: Normal heart sounds. No murmur heard.   Pulmonary:      Effort: Pulmonary effort is normal. No respiratory distress. Breath sounds: Normal breath sounds. No wheezing or rales. Abdominal:      General: Bowel sounds are normal. There is no distension. Palpations: Abdomen is soft. Tenderness: There is no abdominal tenderness. Musculoskeletal:      Comments: Positive Flick test. Negative Phalen sign. Positive Tinel sign bilaterally   Skin:     General: Skin is warm. Coloration: Skin is not jaundiced. Findings: No erythema. Psychiatric:         Behavior: Behavior normal.     ASSESSMENT AND PLAN     1. Numbness and tingling in left hand  -Will evaluate for secondary causes of numbness/tingling  -Discussed following up with pain management  -EMG; Future  -CYCLIC CITRUL PEPTIDE ANTIBODY, IGG; Future  -ANTI-SCLERODERMA ANTIBODY; Future  -Vitamin B12; Future    2. Cigarette nicotine dependence without complication  -Patient is interested in cutting back on smoking.  -nicotine (NICODERM CQ) 14 MG/24HR; Place 1 patch onto the skin daily  Dispense: 42 patch; Refill: 0    Clonoscopy in 2020 showed moderate diffuse gastritis, 6 mm sessile sigmoid colon mild sigmoid colon divertilousis  Has a hx of GERD  Counseled regarding above diagnosis, including possible risks and complications, especially if left uncontrolled. Counseled regarding the possible side effects, risks, benefits and alternatives to treatment; patient and/or guardian verbalizes understanding, agrees, feels comfortable with and wishes to proceed with above treatment plan. Call or go to ED immediately if symptoms worsen or persist. Advised patient to call with any new medication issues, and, as applicable, read all Rx info from pharmacy to assure aware of all possible risks and side effects of medication before taking. Patient and/or guardian given opportunity to ask questions/raise concerns. The patient verbalized comfort and understanding of instructions.     I encourage further reading and education about your health conditions. Information on many health conditions is provided by the American Academy of [employer]: https://familydoctor. org/  Please bring any questions to me at your next visit. Medication List:    Current Outpatient Medications   Medication Sig Dispense Refill    nicotine (NICODERM CQ) 14 MG/24HR Place 1 patch onto the skin daily 42 patch 0    lamoTRIgine (LAMICTAL) 100 MG tablet       LORazepam (ATIVAN) 0.5 MG tablet       ENBREL 50 MG/ML injection INJECT 1 ML UNDER THE SKIN ONCE WEEKLY.  USE ENTIRE CONTENTS OF SYRINGE      amLODIPine (NORVASC) 5 MG tablet Take 1 tablet by mouth daily 90 tablet 0    aspirin 81 MG chewable tablet CHEW AND SWALLOW 1 TABLET BY MOUTH DAILY 90 tablet 0    chlorthalidone (HYGROTON) 25 MG tablet Take 1 tablet by mouth daily 90 tablet 0    losartan (COZAAR) 50 MG tablet Take 1 tablet by mouth daily 90 tablet 0    metoprolol succinate (TOPROL XL) 100 MG extended release tablet Take 1 tablet by mouth daily 90 tablet 0    omeprazole (PRILOSEC) 40 MG delayed release capsule Take 1 capsule by mouth Daily 90 capsule 0    tiZANidine (ZANAFLEX) 4 MG tablet TAKE 1 TABLET BY MOUTH THREE TIMES DAILY AS NEEDED      fluticasone (FLONASE) 50 MCG/ACT nasal spray SHAKE LIQUID AND USE 2 SPRAYS IN EACH NOSTRIL DAILY 48 g 3    albuterol sulfate HFA (VENTOLIN HFA) 108 (90 Base) MCG/ACT inhaler INHALE 2 PUFFS INTO THE LUNGS EVERY 6 HOURS AS NEEDED FOR WHEEZING OR SHORTNESS OF BREATH 1 each 2    azelastine (ASTELIN) 0.1 % nasal spray 2 sprays by Nasal route 2 times daily Use in each nostril as directed 3 each 3    cetirizine (ZYRTEC) 10 MG tablet Take 1 tablet by mouth daily 90 tablet 1    DULoxetine (CYMBALTA) 60 MG extended release capsule Take 1 capsule by mouth daily      diclofenac sodium (VOLTAREN) 1 % GEL APPLY 4 GRAMS TO LOWER BACK FOUR TIMES DAILY AS NEEDED FOR PAIN      oxyCODONE-acetaminophen (PERCOCET)  MG per tablet TAKE 1 TABLET BY MOUTH EVERY 6 HOURS AS NEEDED hydrOXYzine (VISTARIL) 50 MG capsule Take 50 mg by mouth 2 times daily as needed      mirtazapine (REMERON) 30 MG tablet Take 30 mg by mouth nightly as needed (insomnia)      Etanercept 50 MG/ML SOAJ Inject 50 mg into the skin once a week (Patient not taking: Reported on 12/19/2022) 4 each 3    zolpidem (AMBIEN) 5 MG tablet Take 5 mg by mouth nightly as needed. (Patient not taking: Reported on 12/19/2022)      NARCAN 4 MG/0.1ML LIQD nasal spray as needed (Patient not taking: No sig reported)  0     No current facility-administered medications for this visit. Return to Office: No follow-ups on file. This document may have been prepared at least partially through the use of voice recognition software. Although effort is taken to assure the accuracy of this document, it is possible that grammatical, syntax,  or spelling errors may occur.     Senthil Erickson MD

## 2022-12-19 NOTE — PROGRESS NOTES
[facility]  Precepting Note    Subjective:  Hand numbness, chronic  Possible CTS  Using wrist splints    Intermittent diarrhea, abd pain improves with diarrhea  Uses imodium  Lost 10 lbs  Peanut butter and eggs are triggers    Has GERD    Family hx of RA, LIANG , RA for her were neg    ROS otherwise negative     Past medical, surgical, family and social history were reviewed, non-contributory, and unchanged unless otherwise stated. Objective:    /65   Pulse 71   Temp 98 °F (36.7 °C) (Temporal)   Resp 16   Ht 4' 11\" (1.499 m)   Wt 147 lb (66.7 kg)   BMI 29.69 kg/m²     Exam is as noted by resident     A/P  N/T in hands: check EMG  Arthralgia: CCP  Check B12 and work up for scleroderma     Attending Physician Statement  I have reviewed the chart, including any radiology or labs. I have discussed the case, including pertinent history and exam findings with the resident. I agree with the assessment, plan and orders as documented by the resident. Please refer to the resident note for additional information.       Electronically signed by Ortega Ireland MD on 12/19/2022 at 10:20 AM

## 2022-12-20 ASSESSMENT — ENCOUNTER SYMPTOMS
ABDOMINAL PAIN: 1
DIARRHEA: 1
RHINORRHEA: 0
NAUSEA: 0
COUGH: 0
SORE THROAT: 0
CHEST TIGHTNESS: 0
EYE REDNESS: 0
VOMITING: 0

## 2022-12-23 ENCOUNTER — HOSPITAL ENCOUNTER (EMERGENCY)
Age: 62
Discharge: HOME | End: 2022-12-23
Payer: COMMERCIAL

## 2022-12-23 ENCOUNTER — APPOINTMENT (OUTPATIENT)
Dept: CT IMAGING | Age: 62
End: 2022-12-23
Payer: COMMERCIAL

## 2022-12-23 ENCOUNTER — APPOINTMENT (OUTPATIENT)
Dept: GENERAL RADIOLOGY | Age: 62
End: 2022-12-23
Payer: COMMERCIAL

## 2022-12-23 VITALS
OXYGEN SATURATION: 95 % | DIASTOLIC BLOOD PRESSURE: 79 MMHG | HEIGHT: 59 IN | BODY MASS INDEX: 29.69 KG/M2 | TEMPERATURE: 97.8 F | SYSTOLIC BLOOD PRESSURE: 140 MMHG | HEART RATE: 66 BPM | RESPIRATION RATE: 16 BRPM

## 2022-12-23 DIAGNOSIS — G44.309 POST-TRAUMATIC HEADACHE, NOT INTRACTABLE, UNSPECIFIED CHRONICITY PATTERN: ICD-10-CM

## 2022-12-23 DIAGNOSIS — M47.817 FACET ARTHRITIS OF LUMBOSACRAL REGION: ICD-10-CM

## 2022-12-23 DIAGNOSIS — S16.1XXA STRAIN OF NECK MUSCLE, INITIAL ENCOUNTER: Primary | ICD-10-CM
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
## 2022-12-23 DIAGNOSIS — M54.31 SCIATICA OF RIGHT SIDE: ICD-10-CM

## 2022-12-23 PROCEDURE — 99284 EMERGENCY DEPT VISIT MOD MDM: CPT

## 2022-12-23 PROCEDURE — 70450 CT HEAD/BRAIN W/O DYE: CPT

## 2022-12-23 PROCEDURE — 6370000000 HC RX 637 (ALT 250 FOR IP): Performed by: NURSE PRACTITIONER

## 2022-12-23 PROCEDURE — 96372 THER/PROPH/DIAG INJ SC/IM: CPT

## 2022-12-23 PROCEDURE — 6360000002 HC RX W HCPCS: Performed by: NURSE PRACTITIONER

## 2022-12-23 PROCEDURE — 73502 X-RAY EXAM HIP UNI 2-3 VIEWS: CPT

## 2022-12-23 PROCEDURE — 72125 CT NECK SPINE W/O DYE: CPT

## 2022-12-23 PROCEDURE — 72131 CT LUMBAR SPINE W/O DYE: CPT

## 2022-12-23 RX ORDER — ACETAMINOPHEN 325 MG/1
650 TABLET ORAL ONCE
Status: COMPLETED | OUTPATIENT
Start: 2022-12-23 | End: 2022-12-23

## 2022-12-23 RX ORDER — OXYCODONE HYDROCHLORIDE AND ACETAMINOPHEN 5; 325 MG/1; MG/1
1 TABLET ORAL ONCE
Status: DISCONTINUED | OUTPATIENT
Start: 2022-12-23 | End: 2022-12-23

## 2022-12-23 RX ORDER — KETOROLAC TROMETHAMINE 30 MG/ML
30 INJECTION, SOLUTION INTRAMUSCULAR; INTRAVENOUS ONCE
Status: COMPLETED | OUTPATIENT
Start: 2022-12-23 | End: 2022-12-23

## 2022-12-23 RX ORDER — ORPHENADRINE CITRATE 30 MG/ML
60 INJECTION INTRAMUSCULAR; INTRAVENOUS ONCE
Status: DISCONTINUED | OUTPATIENT
Start: 2022-12-23 | End: 2022-12-23

## 2022-12-23 RX ADMIN — ACETAMINOPHEN 650 MG: 325 TABLET ORAL at 12:32

## 2022-12-23 RX ADMIN — KETOROLAC TROMETHAMINE 30 MG: 30 INJECTION, SOLUTION INTRAMUSCULAR; INTRAVENOUS at 13:46
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
## 2022-12-29 ENCOUNTER — OFFICE VISIT (OUTPATIENT)
Dept: FAMILY MEDICINE CLINIC | Age: 62
End: 2022-12-29
Payer: COMMERCIAL

## 2022-12-29 VITALS
RESPIRATION RATE: 16 BRPM | TEMPERATURE: 97.1 F | SYSTOLIC BLOOD PRESSURE: 139 MMHG | HEIGHT: 59 IN | BODY MASS INDEX: 30.04 KG/M2 | DIASTOLIC BLOOD PRESSURE: 67 MMHG | WEIGHT: 149 LBS | HEART RATE: 57 BPM | OXYGEN SATURATION: 95 %

## 2022-12-29 DIAGNOSIS — M54.41 ACUTE LOW BACK PAIN WITH RIGHT-SIDED SCIATICA, UNSPECIFIED BACK PAIN LATERALITY: Primary | ICD-10-CM

## 2022-12-29 DIAGNOSIS — G44.311 INTRACTABLE ACUTE POST-TRAUMATIC HEADACHE: ICD-10-CM

## 2022-12-29 PROCEDURE — 99213 OFFICE O/P EST LOW 20 MIN: CPT

## 2022-12-29 PROCEDURE — 3078F DIAST BP <80 MM HG: CPT

## 2022-12-29 PROCEDURE — 3074F SYST BP LT 130 MM HG: CPT

## 2022-12-29 RX ORDER — MIRTAZAPINE 15 MG/1
TABLET, FILM COATED ORAL
COMMUNITY
Start: 2022-11-07
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
## 2023-01-05 ENCOUNTER — HOSPITAL ENCOUNTER (OUTPATIENT)
Age: 63
Discharge: HOME | End: 2023-01-05
Payer: COMMERCIAL

## 2023-01-05 DIAGNOSIS — R20.2 NUMBNESS AND TINGLING IN LEFT HAND: ICD-10-CM

## 2023-01-05 DIAGNOSIS — R20.0 NUMBNESS AND TINGLING IN LEFT HAND: ICD-10-CM

## 2023-01-05 LAB — VITAMIN B-12: 589 PG/ML (ref 211–946)

## 2023-01-05 PROCEDURE — 36415 COLL VENOUS BLD VENIPUNCTURE: CPT

## 2023-01-05 PROCEDURE — 86235 NUCLEAR ANTIGEN ANTIBODY: CPT

## 2023-01-05 PROCEDURE — 86200 CCP ANTIBODY: CPT

## 2023-01-05 PROCEDURE — 82607 VITAMIN B-12: CPT

## 2023-01-05 ASSESSMENT — ENCOUNTER SYMPTOMS
VOMITING: 0
CONSTIPATION: 0
COLOR CHANGE: 0
ABDOMINAL PAIN: 0
BACK PAIN: 1
DIARRHEA: 0
NAUSEA: 0
SORE THROAT: 0
COUGH: 0
TROUBLE SWALLOWING: 0
SHORTNESS OF BREATH: 0

## 2023-01-06 LAB — SCLERODERMA (SCL-70) AB: NEGATIVE

## 2023-01-18 ENCOUNTER — HOSPITAL ENCOUNTER (OUTPATIENT)
Dept: NEUROLOGY | Age: 63
Discharge: HOME | End: 2023-01-18
Payer: COMMERCIAL

## 2023-01-18 VITALS — HEIGHT: 59 IN | BODY MASS INDEX: 28.22 KG/M2 | WEIGHT: 140 LBS

## 2023-01-18 DIAGNOSIS — R20.0 NUMBNESS AND TINGLING IN LEFT HAND: ICD-10-CM

## 2023-01-18 DIAGNOSIS — R20.2 NUMBNESS AND TINGLING IN LEFT HAND: ICD-10-CM

## 2023-01-18 PROCEDURE — 95885 MUSC TST DONE W/NERV TST LIM: CPT

## 2023-01-18 PROCEDURE — 95912 NRV CNDJ TEST 11-12 STUDIES: CPT

## 2023-01-18 PROCEDURE — 95886 MUSC TEST DONE W/N TEST COMP: CPT

## 2023-01-18 NOTE — PROCEDURES
[facility]  [address], [address]  Phone: [phone]  Fax: [phone]      Referring Provider: Patt Shaw MD  Primary Care Physician: France Magana MD  Patient Name: Deisy Gant  Patient YOB: 1960  Gender: female  BMI: Body mass index is 28.28 kg/m². Height 4' 11\" (1.499 m), weight 140 lb (63.5 kg), not currently breastfeeding. 1/18/2023    Reason for referral: numbness and tingling left hand    Description of clinical problem:   Patient reports pain in both hands, described as aching pain in her joints. She also reports numbness/tingling in both hands. Pain: Yes   ; Numbness/tingling: Yes; Weakness: Yes      Brief physical exam:   Sensory deficit: No; Weakness: No; Reflex abnormality: No      Study Limitations: Poor tolerance of test, difficulty relaxing muscles during needle exam    Motor NCS      Nerve / Sites Lat. Amplitude Distance Lat Diff Velocity Temp. Amp. 1-2    ms mV cm ms m/s °C %   L Median - APB      Wrist 4.43 12.1 8   32 100      Elbow 7.97 11.4 18 3.54 51 32 94.3   R Median - APB      Wrist 5.94 8.7 8   32 100      Elbow 9.58 8.5 18 3.65 49 32 98.3   L Ulnar - ADM      Wrist 2.97 13.4 8   32 100      B. Elbow 5.57 13.2 15 2.60 58 32 98.8      A. Elbow 7.29 13.1 10 1.72 58 32 97.8       Sensory NCS      Nerve / Sites Onset Lat Peak Lat PP Amp Distance Velocity Temp.    ms ms µV cm m/s °C   R Median - Digit II (Antidromic)      Mid Palm 1.41 1.88 20.3 7 50 32      Wrist 4.27 5.16 18.5 14 33 32   L Median - Digit II (Antidromic)      Mid Palm 1.09 1.82 76.1 7 64 32.1      Wrist 3.33 4.01 63.7 14 42 32   L Ulnar - Digit V (Antidromic)      Wrist 2.50 3.13 33.1 14 56 32   L Radial - Anatomical snuff box (Forearm)      Forearm 1.67 2.29 23.7 10 60 32                 Combined Sensory Index      Nerve / Sites Rec. Site Peak Lat NP Amp PP Amp Segments Peak Diff Temp.      ms µV µV  ms °C   L Median - CSI Median Thumb 3.59 21.9 33.3 Median - Radial 1.15 32.1      Radial Thumb 2.45 7.9 11.2 Median - Ulnar 1.25 32.1      Median Ring 4.01 18.5 30.5 Median palm - Ulnar palm 0.63 32.1      Ulnar Ring 2.76 14.8 21.3         Median palm Wrist 2.50 55.6 61.6         Ulnar palm Wrist 1.88 12.0 11.3         CSI     CSI 3.02        F  Wave      Nerve F Lat M Lat F-M Lat    ms ms ms   L Median - APB 27.6 4.4 23.1   L Ulnar - ADM 26.7 2.9 23.8   R Median - APB 30.7 6.1 24.5       EMG         EMG Summary Table     Spontaneous MUAP Recruitment   Muscle IA Fib PSW Fasc H.F. Amp Dur. PPP Pattern   L. Deltoid N None None None None N N N N   L. Biceps brachii N None None None None N N N N   L. Triceps brachii N None None None None N N N N   L. Pronator teres N None None None None N N N N   L. First dorsal interosseous N None None None None N N N N   L. Abductor pollicis brevis N None None None None N N N N   L. Cervical paraspinals (mid) N None None None None N N N N   L. Cervical paraspinals (low) N None None None None N N N N   R. Abductor pollicis brevis N None None None None 1+ N 1+ N              Summary of Findings:   Nerve conduction studies: The following nerve conduction studies were abnormal:   ***  All other nerve conduction studies listed in the table above were normal in latency, amplitude and conduction velocity. Needle EMG:   Needle EMG was performed using a *** needle. The following abnormalities were seen on needle EMG: ***  All other muscles tested, as listed in the table above demonstrated normal amplitude, duration, phases and recruitment and no active denervation signs were seen. Diagnostic Interpretation: This study was {Normal/Abnormal:1201380112}. Electrodiagnosis: There is electrodiagnostic evidence of a***. Location: {RIGHT/LEFT/ARNAV:02597} {SYMMETRIC/ASYMMETRIC:445865008}; {Desc; prox/mid/distal:90758}, at the ***.    Nature: [  ] Axonal   [  ] Demyelinating  [  ] Mixed axonal and demyelinating     [  ] Sensory [  ] Motor               [  ] Mixed sensorimotor     [  ] with active denervation       [  ] without active denervation  Duration: {Desc; acute/subacute/chronic:13799}  Severity: {MILD/MODERATE/SEVERE:21011}  Prognosis: {Prognosis:20761}    Previous Study: There {IS/IS OOY:31466} a prior study for comparison. Date: ***. Provider: ***. Compared to prior study, today's study showed***. Follow up EMG is recommended if ***. Technologist: ***  Physician:***    Nerve conduction studies and electromyography were performed according to our laboratory policies and procedures which can be provided upon request. All abnormal values are identified in the table.  Laboratory normal values can also be provided upon request.       Cc: Beryle Piedra, MD Karyle Minder, MD

## 2023-01-31 ENCOUNTER — OFFICE VISIT (OUTPATIENT)
Dept: FAMILY MEDICINE CLINIC | Age: 63
End: 2023-01-31
Payer: COMMERCIAL

## 2023-01-31 VITALS
WEIGHT: 146 LBS | HEART RATE: 65 BPM | HEIGHT: 59 IN | DIASTOLIC BLOOD PRESSURE: 62 MMHG | BODY MASS INDEX: 29.43 KG/M2 | SYSTOLIC BLOOD PRESSURE: 130 MMHG

## 2023-01-31 DIAGNOSIS — I10 ESSENTIAL HYPERTENSION: ICD-10-CM

## 2023-01-31 DIAGNOSIS — M47.816 FACET SYNDROME, LUMBAR: Primary | ICD-10-CM

## 2023-01-31 DIAGNOSIS — F17.210 CIGARETTE NICOTINE DEPENDENCE WITHOUT COMPLICATION: ICD-10-CM

## 2023-01-31 DIAGNOSIS — M54.2 CERVICALGIA: ICD-10-CM

## 2023-01-31 DIAGNOSIS — I25.10 CAD IN NATIVE ARTERY: Chronic | ICD-10-CM

## 2023-01-31 DIAGNOSIS — R05.3 CHRONIC COUGH: ICD-10-CM

## 2023-01-31 DIAGNOSIS — M46.97 INFLAMMATORY SPONDYLOPATHY OF LUMBOSACRAL REGION (HCC): ICD-10-CM

## 2023-01-31 PROCEDURE — 99214 OFFICE O/P EST MOD 30 MIN: CPT | Performed by: FAMILY MEDICINE

## 2023-01-31 PROCEDURE — 3078F DIAST BP <80 MM HG: CPT | Performed by: FAMILY MEDICINE

## 2023-01-31 PROCEDURE — 3074F SYST BP LT 130 MM HG: CPT | Performed by: FAMILY MEDICINE

## 2023-01-31 RX ORDER — LOSARTAN POTASSIUM 50 MG/1
50 TABLET ORAL DAILY
Qty: 90 TABLET | Refills: 0 | Status: SHIPPED | OUTPATIENT
Start: 2023-01-31

## 2023-01-31 RX ORDER — AMLODIPINE BESYLATE 5 MG/1
5 TABLET ORAL DAILY
Qty: 90 TABLET | Refills: 0 | Status: SHIPPED | OUTPATIENT
Start: 2023-01-31

## 2023-01-31 RX ORDER — ATORVASTATIN CALCIUM 80 MG/1
80 TABLET, FILM COATED ORAL DAILY
Qty: 90 TABLET | Refills: 1 | Status: SHIPPED | OUTPATIENT
Start: 2023-01-31

## 2023-01-31 RX ORDER — DICLOFENAC SODIUM 75 MG/1
75 TABLET, DELAYED RELEASE ORAL 2 TIMES DAILY PRN
COMMUNITY
Start: 2023-01-31

## 2023-01-31 RX ORDER — DICLOFENAC SODIUM AND MISOPROSTOL 75; 200 MG/1; UG/1
1 TABLET, DELAYED RELEASE ORAL 2 TIMES DAILY
COMMUNITY
End: 2023-01-31

## 2023-01-31 RX ORDER — CHLORTHALIDONE 25 MG/1
25 TABLET ORAL DAILY
Qty: 90 TABLET | Refills: 0 | Status: SHIPPED | OUTPATIENT
Start: 2023-01-31

## 2023-01-31 RX ORDER — NICOTINE 21 MG/24HR
1 PATCH, TRANSDERMAL 24 HOURS TRANSDERMAL DAILY
Qty: 30 PATCH | Refills: 2 | Status: SHIPPED | OUTPATIENT
Start: 2023-01-31 | End: 2023-03-14

## 2023-01-31 RX ORDER — NICOTINE 21 MG/24HR
1 PATCH, TRANSDERMAL 24 HOURS TRANSDERMAL DAILY
Qty: 42 PATCH | Refills: 0 | Status: SHIPPED | OUTPATIENT
Start: 2023-01-31 | End: 2023-03-14

## 2023-01-31 RX ORDER — METOPROLOL SUCCINATE 100 MG/1
100 TABLET, EXTENDED RELEASE ORAL DAILY
Qty: 90 TABLET | Refills: 0 | Status: SHIPPED | OUTPATIENT
Start: 2023-01-31

## 2023-01-31 ASSESSMENT — PATIENT HEALTH QUESTIONNAIRE - PHQ9
SUM OF ALL RESPONSES TO PHQ QUESTIONS 1-9: 0
7. TROUBLE CONCENTRATING ON THINGS, SUCH AS READING THE NEWSPAPER OR WATCHING TELEVISION: 0
2. FEELING DOWN, DEPRESSED OR HOPELESS: 0
4. FEELING TIRED OR HAVING LITTLE ENERGY: 0
3. TROUBLE FALLING OR STAYING ASLEEP: 0
SUM OF ALL RESPONSES TO PHQ9 QUESTIONS 1 & 2: 0
6. FEELING BAD ABOUT YOURSELF - OR THAT YOU ARE A FAILURE OR HAVE LET YOURSELF OR YOUR FAMILY DOWN: 0
SUM OF ALL RESPONSES TO PHQ QUESTIONS 1-9: 0
1. LITTLE INTEREST OR PLEASURE IN DOING THINGS: 0
5. POOR APPETITE OR OVEREATING: 0
9. THOUGHTS THAT YOU WOULD BE BETTER OFF DEAD, OR OF HURTING YOURSELF: 0
10. IF YOU CHECKED OFF ANY PROBLEMS, HOW DIFFICULT HAVE THESE PROBLEMS MADE IT FOR YOU TO DO YOUR WORK, TAKE CARE OF THINGS AT HOME, OR GET ALONG WITH OTHER PEOPLE: 0
8. MOVING OR SPEAKING SO SLOWLY THAT OTHER PEOPLE COULD HAVE NOTICED. OR THE OPPOSITE, BEING SO FIGETY OR RESTLESS THAT YOU HAVE BEEN MOVING AROUND A LOT MORE THAN USUAL: 0

## 2023-01-31 ASSESSMENT — ENCOUNTER SYMPTOMS
COUGH: 1
WHEEZING: 0
SHORTNESS OF BREATH: 1
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
## 2023-02-06 DIAGNOSIS — G56.03 BILATERAL CARPAL TUNNEL SYNDROME: Primary | ICD-10-CM

## 2023-02-17 ENCOUNTER — TELEPHONE (OUTPATIENT)
Dept: FAMILY MEDICINE CLINIC | Age: 63
End: 2023-02-17

## 2023-02-17 NOTE — TELEPHONE ENCOUNTER
Patient was diagnosed with Covid today and wonders if she can have Paxlovid due to her autoimmune disorder. [facility] on [address].

## 2023-05-30 ENCOUNTER — OFFICE VISIT (OUTPATIENT)
Dept: FAMILY MEDICINE CLINIC | Age: 63
End: 2023-05-30
Payer: COMMERCIAL

## 2023-05-30 VITALS
WEIGHT: 150 LBS | BODY MASS INDEX: 30.24 KG/M2 | SYSTOLIC BLOOD PRESSURE: 130 MMHG | DIASTOLIC BLOOD PRESSURE: 72 MMHG | HEART RATE: 54 BPM | HEIGHT: 59 IN

## 2023-05-30 DIAGNOSIS — R09.89 OTHER SPECIFIED SYMPTOMS AND SIGNS INVOLVING THE CIRCULATORY AND RESPIRATORY SYSTEMS: ICD-10-CM

## 2023-05-30 DIAGNOSIS — I10 ESSENTIAL HYPERTENSION: Primary | ICD-10-CM

## 2023-05-30 DIAGNOSIS — F17.200 TOBACCO DEPENDENCE: ICD-10-CM

## 2023-05-30 DIAGNOSIS — Z23 NEED FOR PNEUMOCOCCAL 20-VALENT CONJUGATE VACCINATION: ICD-10-CM

## 2023-05-30 DIAGNOSIS — Z87.891 PERSONAL HISTORY OF TOBACCO USE: ICD-10-CM

## 2023-05-30 DIAGNOSIS — Z23 NEED FOR SHINGLES VACCINE: ICD-10-CM

## 2023-05-30 DIAGNOSIS — Z12.31 ENCOUNTER FOR SCREENING MAMMOGRAM FOR MALIGNANT NEOPLASM OF BREAST: ICD-10-CM

## 2023-05-30 DIAGNOSIS — D75.1 POLYCYTHEMIA: ICD-10-CM

## 2023-05-30 DIAGNOSIS — R05.3 CHRONIC COUGH: ICD-10-CM

## 2023-05-30 DIAGNOSIS — R25.2 LEG CRAMPS: ICD-10-CM

## 2023-05-30 DIAGNOSIS — I25.10 CAD IN NATIVE ARTERY: Chronic | ICD-10-CM

## 2023-05-30 PROCEDURE — 99214 OFFICE O/P EST MOD 30 MIN: CPT | Performed by: FAMILY MEDICINE

## 2023-05-30 PROCEDURE — G8417 CALC BMI ABV UP PARAM F/U: HCPCS | Performed by: FAMILY MEDICINE

## 2023-05-30 PROCEDURE — 3017F COLORECTAL CA SCREEN DOC REV: CPT | Performed by: FAMILY MEDICINE

## 2023-05-30 PROCEDURE — G0296 VISIT TO DETERM LDCT ELIG: HCPCS | Performed by: FAMILY MEDICINE

## 2023-05-30 PROCEDURE — 3074F SYST BP LT 130 MM HG: CPT | Performed by: FAMILY MEDICINE

## 2023-05-30 PROCEDURE — 4004F PT TOBACCO SCREEN RCVD TLK: CPT | Performed by: FAMILY MEDICINE

## 2023-05-30 PROCEDURE — 3078F DIAST BP <80 MM HG: CPT | Performed by: FAMILY MEDICINE

## 2023-05-30 PROCEDURE — G8427 DOCREV CUR MEDS BY ELIG CLIN: HCPCS | Performed by: FAMILY MEDICINE

## 2023-05-30 RX ORDER — CHLORTHALIDONE 25 MG/1
25 TABLET ORAL DAILY
Qty: 90 TABLET | Refills: 0 | Status: SHIPPED | OUTPATIENT
Start: 2023-05-30

## 2023-05-30 RX ORDER — LOSARTAN POTASSIUM 50 MG/1
50 TABLET ORAL DAILY
Qty: 90 TABLET | Refills: 0 | Status: SHIPPED | OUTPATIENT
Start: 2023-05-30

## 2023-05-30 RX ORDER — VARENICLINE TARTRATE 0.5 MG/1
.5-1 TABLET, FILM COATED ORAL SEE ADMIN INSTRUCTIONS
Qty: 57 TABLET | Refills: 0 | Status: SHIPPED | OUTPATIENT
Start: 2023-05-30

## 2023-05-30 RX ORDER — METOPROLOL SUCCINATE 100 MG/1
100 TABLET, EXTENDED RELEASE ORAL DAILY
Qty: 90 TABLET | Refills: 0 | Status: SHIPPED | OUTPATIENT
Start: 2023-05-30

## 2023-05-30 RX ORDER — ZOSTER VACCINE RECOMBINANT, ADJUVANTED 50 MCG/0.5
0.5 KIT INTRAMUSCULAR SEE ADMIN INSTRUCTIONS
Qty: 0.5 ML | Refills: 0 | Status: SHIPPED | OUTPATIENT
Start: 2023-05-30 | End: 2023-11-26

## 2023-05-30 ASSESSMENT — ENCOUNTER SYMPTOMS
BACK PAIN: 1
SHORTNESS OF BREATH: 0
COUGH: 1

## 2023-05-30 NOTE — PROGRESS NOTES
5/30/2023    Isabel Freedman is a 63 y.o. female here for   Chief Complaint   Patient presents with    Follow-up    Fatigue    Discuss Labs   Ct scan - screen is due. Follows with rheum, pain management  Ankylosing spondylitis   On enbrel  Still having joint pains despite DMARD. Very frustrated. She is going through a divorce  She had been together for a long time. \"I feel like crap\"  +headaches  All over body aches  Aching pain. She had labs done recently - hgemoglobin 16.1, wbc 13.5, CMP with normal cmp - bun 23/ .98 , esr 2  Worried about the elevated hemoglobin / wbc. She would like to quit smoking     She gets episodes of fatigue bilateral lower extremities, when she is on her feet  Occurring for longer periods of time, though has not been occurring more frequently  In a month will have 5-7 times. Periodic. Nothing that is predictable. She is having worsening low back pain. She had xr done at [facility] [place] - Sacroiliac joint and hand   SI joint / ap pelvis 5/23/23  Impression    IMPRESSION:There is mild sclerosis on both sides of the left sacroiliac   joint. There is mild widening of the left sacroiliac joint as compared   to the right. There is no erosion and no interval change. Bilateral hip   joints are maintained. There are degenerative changes in the imaged   portion of the lower lumbar spine. Regarding hypertension. Patient is not monitoring home blood pressures. Cardiovascular risk factors: hypertension and smoking/ tobacco exposure. Patient does not smoke. Currently on losartan 50 mg daily, chlorthalidone 25 mg daily, metoprolol xl 100 mg daily. Taking as prescribed. No adverse effects. Today,  BP: 130/72 and she is asymptomatic. BP Readings from Last 3 Encounters:   05/30/23 130/72   01/31/23 130/62   12/29/22 139/67     Patient denies chest pain, diaphoresis, dyspnea, dyspnea on exertion, peripheral edema, palpitations, headache, vision changes.     Wt Readings from Last 3

## 2023-05-30 NOTE — PATIENT INSTRUCTIONS
follow-up, he or she will help you understand what to do next. After a lung cancer screening, you can go back to your usual activities right away. A lung cancer screening test can't tell if you have lung cancer. If your results are positive, your doctor can't tell whether an abnormal finding is a harmless nodule, cancer, or something else without doing more tests. What can you do to help prevent lung cancer? Some lung cancers can't be prevented. But if you smoke, quitting smoking is the best step you can take to prevent lung cancer. If you want to quit, your doctor can recommend medicines or other ways to help. Follow-up care is a key part of your treatment and safety. Be sure to make and go to all appointments, and call your doctor if you are having problems. It's also a good idea to know your test results and keep a list of the medicines you take. Where can you learn more? Go to http://www.hazel.com/ and enter Q940 to learn more about \"Learning About Lung Cancer Screening. \"  Current as of: May 4, 2022               Content Version: 13.6  © 7507-7231 [employer]wise, Incorporated. Care instructions adapted under license by [facility] ([facility]). If you have questions about a medical condition or this instruction, always ask your healthcare professional. [address] any warranty or liability for your use of this information.

## 2023-06-01 NOTE — PROGRESS NOTES
PT did not, when do you want her to her bw? She can get the shot then or come earlier if you want the bw closer to her next appointment.

## 2023-06-19 ENCOUNTER — HOSPITAL ENCOUNTER (OUTPATIENT)
Dept: GENERAL RADIOLOGY | Age: 63
Discharge: HOME | End: 2023-06-21
Payer: COMMERCIAL

## 2023-06-19 VITALS — BODY MASS INDEX: 30.24 KG/M2 | HEIGHT: 59 IN | WEIGHT: 150 LBS

## 2023-06-19 DIAGNOSIS — Z12.31 ENCOUNTER FOR SCREENING MAMMOGRAM FOR MALIGNANT NEOPLASM OF BREAST: ICD-10-CM

## 2023-06-19 PROCEDURE — 77063 BREAST TOMOSYNTHESIS BI: CPT

## 2023-07-06 ENCOUNTER — OFFICE VISIT (OUTPATIENT)
Dept: CARDIOLOGY CLINIC | Age: 63
End: 2023-07-06
Payer: COMMERCIAL

## 2023-07-06 VITALS
HEART RATE: 47 BPM | DIASTOLIC BLOOD PRESSURE: 79 MMHG | RESPIRATION RATE: 16 BRPM | WEIGHT: 152 LBS | SYSTOLIC BLOOD PRESSURE: 122 MMHG | BODY MASS INDEX: 32.79 KG/M2 | HEIGHT: 57 IN

## 2023-07-06 DIAGNOSIS — I25.10 CAD IN NATIVE ARTERY: Chronic | ICD-10-CM

## 2023-07-06 DIAGNOSIS — R00.2 PALPITATIONS: Primary | ICD-10-CM

## 2023-07-06 PROBLEM — Z15.89 HLA B27 (HLA B27 POSITIVE): Status: RESOLVED | Noted: 2017-10-10 | Resolved: 2023-07-06

## 2023-07-06 PROBLEM — Z13.31 POSITIVE DEPRESSION SCREENING: Status: RESOLVED | Noted: 2022-02-07 | Resolved: 2023-07-06

## 2023-07-06 PROCEDURE — 93000 ELECTROCARDIOGRAM COMPLETE: CPT | Performed by: INTERNAL MEDICINE

## 2023-07-06 PROCEDURE — G8417 CALC BMI ABV UP PARAM F/U: HCPCS | Performed by: INTERNAL MEDICINE

## 2023-07-06 PROCEDURE — 99214 OFFICE O/P EST MOD 30 MIN: CPT | Performed by: INTERNAL MEDICINE

## 2023-07-06 PROCEDURE — 3017F COLORECTAL CA SCREEN DOC REV: CPT | Performed by: INTERNAL MEDICINE

## 2023-07-06 PROCEDURE — 4004F PT TOBACCO SCREEN RCVD TLK: CPT | Performed by: INTERNAL MEDICINE

## 2023-07-06 PROCEDURE — G8427 DOCREV CUR MEDS BY ELIG CLIN: HCPCS | Performed by: INTERNAL MEDICINE

## 2023-07-06 PROCEDURE — 3078F DIAST BP <80 MM HG: CPT | Performed by: INTERNAL MEDICINE

## 2023-07-06 PROCEDURE — 3074F SYST BP LT 130 MM HG: CPT | Performed by: INTERNAL MEDICINE

## 2023-07-06 NOTE — PROGRESS NOTES
Chief Complaint   Patient presents with    Palpitations       Patient Active Problem List    Diagnosis Date Noted    Episode of recurrent major depressive disorder ([address]) 02/07/2022    Memory problem 02/07/2022    History of ankylosing spondylitis 12/29/2021    Palpitations 12/22/2021     Overview Note:     A. Monitor 1/2022: one ten beat SVT         Inflammatory spondylopathy of lumbosacral region [facility]) 12/04/2020    Obesity     Vertigo 03/21/2019    CAD in native artery      Overview Note:     A. \"ACS\" 2018 (RV):  \" severe sequential lesions small non dominant RCA\"  B.   Cath 9/25/20 Darcella Fell):  \"70% small OM\"        Mixed hyperlipidemia 09/22/2018    OLIVIA (obstructive sleep apnea) 04/05/2018    Pulmonary nodule 09/27/2016    Facet syndrome, lumbar 03/09/2016    Essential hypertension 11/11/2015    Diverticulosis of sigmoid colon     GERD (gastroesophageal reflux disease)     Anxiety and depression 09/11/2013       Current Outpatient Medications   Medication Sig Dispense Refill    varenicline (CHANTIX) 0.5 MG tablet Take 1-2 tablets by mouth See Admin Instructions 0.5mg DAILY for 3 days followed by 0.5mg TWICE DAILY for 4 days followed by 1mg TWICE DAILY 57 tablet 0    metoprolol succinate (TOPROL XL) 100 MG extended release tablet Take 1 tablet by mouth daily 90 tablet 0    chlorthalidone (HYGROTON) 25 MG tablet Take 1 tablet by mouth daily 90 tablet 0    losartan (COZAAR) 50 MG tablet Take 1 tablet by mouth daily 90 tablet 0    omeprazole (PRILOSEC) 40 MG delayed release capsule Take 1 capsule by mouth Daily 90 capsule 0    tiZANidine (ZANAFLEX) 4 MG tablet TAKE 1 TABLET BY MOUTH THREE TIMES DAILY AS NEEDED      Etanercept 50 MG/ML SOAJ Inject 50 mg into the skin once a week 4 each 3    DULoxetine (CYMBALTA) 60 MG extended release capsule Take 1 capsule by mouth daily      oxyCODONE-acetaminophen (PERCOCET)  MG per tablet TAKE 1 TABLET BY MOUTH EVERY 6 HOURS AS NEEDED       No current facility-administered

## 2023-07-10 ENCOUNTER — TELEPHONE (OUTPATIENT)
Dept: CASE MANAGEMENT | Age: 63
End: 2023-07-10

## 2023-07-11 ENCOUNTER — HOSPITAL ENCOUNTER (OUTPATIENT)
Dept: INTERVENTIONAL RADIOLOGY/VASCULAR | Age: 63
Discharge: HOME | End: 2023-07-13
Payer: COMMERCIAL

## 2023-07-11 ENCOUNTER — HOSPITAL ENCOUNTER (OUTPATIENT)
Dept: PULMONOLOGY | Age: 63
Discharge: HOME | End: 2023-07-11
Payer: COMMERCIAL

## 2023-07-11 ENCOUNTER — HOSPITAL ENCOUNTER (OUTPATIENT)
Dept: CT IMAGING | Age: 63
Discharge: HOME | End: 2023-07-13
Payer: COMMERCIAL

## 2023-07-11 DIAGNOSIS — I10 ESSENTIAL HYPERTENSION: ICD-10-CM

## 2023-07-11 DIAGNOSIS — D75.1 POLYCYTHEMIA: ICD-10-CM

## 2023-07-11 DIAGNOSIS — R25.2 LEG CRAMPS: ICD-10-CM

## 2023-07-11 DIAGNOSIS — R09.89 OTHER SPECIFIED SYMPTOMS AND SIGNS INVOLVING THE CIRCULATORY AND RESPIRATORY SYSTEMS: ICD-10-CM

## 2023-07-11 DIAGNOSIS — Z87.891 PERSONAL HISTORY OF TOBACCO USE: ICD-10-CM

## 2023-07-11 DIAGNOSIS — R05.3 CHRONIC COUGH: ICD-10-CM

## 2023-07-11 PROCEDURE — 94726 PLETHYSMOGRAPHY LUNG VOLUMES: CPT

## 2023-07-11 PROCEDURE — 71271 CT THORAX LUNG CANCER SCR C-: CPT

## 2023-07-11 PROCEDURE — 94060 EVALUATION OF WHEEZING: CPT

## 2023-07-11 PROCEDURE — 93922 UPR/L XTREMITY ART 2 LEVELS: CPT

## 2023-07-11 PROCEDURE — 94729 DIFFUSING CAPACITY: CPT

## 2023-07-12 ENCOUNTER — TELEPHONE (OUTPATIENT)
Dept: CASE MANAGEMENT | Age: 63
End: 2023-07-12

## 2023-07-12 ENCOUNTER — HOSPITAL ENCOUNTER (OUTPATIENT)
Dept: MRI IMAGING | Age: 63
Discharge: HOME | End: 2023-07-14
Payer: COMMERCIAL

## 2023-07-12 DIAGNOSIS — M45.9 ANKYLOSING SPONDYLITIS, UNSPECIFIED SITE OF SPINE (HCC): ICD-10-CM

## 2023-07-12 PROCEDURE — 94726 PLETHYSMOGRAPHY LUNG VOLUMES: CPT | Performed by: INTERNAL MEDICINE

## 2023-07-12 PROCEDURE — 72148 MRI LUMBAR SPINE W/O DYE: CPT

## 2023-07-12 PROCEDURE — 94729 DIFFUSING CAPACITY: CPT | Performed by: INTERNAL MEDICINE

## 2023-07-12 PROCEDURE — 72146 MRI CHEST SPINE W/O DYE: CPT

## 2023-07-12 PROCEDURE — 94060 EVALUATION OF WHEEZING: CPT | Performed by: INTERNAL MEDICINE

## 2023-07-12 NOTE — PROCEDURES
[address], [address] [place] [address]                               PULMONARY FUNCTION    PATIENT NAME: Travis Hurtado                    :        1960  MED REC NO:   [id number]                            ROOM:  ACCOUNT NO:   [de-identified]                           ADMIT DATE: 2023  PROVIDER:     Sheldon Chen DO    DATE OF PROCEDURE:  2023    IDENTIFYING INFORMATION:  A 63-year-old female, 58 inches, 152 pounds. INDICATIONS:  Cigarette smoker, 42-pack years, with chronic cough. FINDINGS:  Pulmonary function test revealed forced vital capacity  post-bronchodilator of 2.58 liters, 150% of predicted, with an FEV1 of  1.93 liters, 96% of predicted, with an FEV1/FVC ratio of 65%. Midflow  rates are 44% of predicted with a maximum voluntary ventilation of 3  liters per minute, 57% of predicted. Static lung volumes with slow vital capacity of 3.39 liters, 123% of  predicted. Inspiratory capacity of 1.88 liters, 112% of predicted. Expiratory reserve volume 1.21 liters, 144% of predicted. Thoracic gas  volume 7.57 liters, 327% of predicted. Residual volume of 6.36 liters,  327% of predicted. Total lung capacity 9.47 liters, 227% of predicted,  with an RV to TLC ratio 159% of predicted. Diffusion capacity is mildly  reduced at 11.92 mL/min per mmHg which is 71% of predicted. IMPRESSION:  Mild airflow obstruction with evidence of  hyperinflation and dynamic air trapping with minimal loss in gas transfer. Clinical correlation needed.         Sheldon Chen DO    D: 2023 16:09:03       T: 2023 23:55:08     [id number]  Job#: [id number]     Doc#: [id number]    CC:

## 2023-07-12 NOTE — TELEPHONE ENCOUNTER
No call, encounter opened to process CT Lung Screening. CT Lung Screen: 7/11/2023    IMPRESSION:  1. 1.0 x 1.2 cm irregular suspicious nodule seen within the right upper lobe  inferiorly and anteriorly. Given the size of this nodule and location of  this nodule dedicated PET-CT imaging is recommended. Please note this nodule  is immediately adjacent to 2 vessels. CT-guided biopsy would be technically  challenging. LUNG RADS:  Lung-RADS 4A - Suspicious ()     Management:  3 month LDCT; PET/CT may be considered if there is a >= 8 mm  solid nodule or solid component     RECOMMENDATIONS:  If you would like to register your patient with the [facility], please contact the Nurse Navigator at  [phone]. Pack years: 21    Social History     Tobacco Use  Smoking Status: Current Every Day Smoker    Start Date:    Quit Date: 04/10/2021   Types: Cigarettes   Packs/Day: 0.5   Years: 40   Pack Years: 20   Smokeless Tobacco: Never         Results letter sent to patient via my chart or mailed.      [address]

## 2023-07-13 ENCOUNTER — TELEPHONE (OUTPATIENT)
Dept: FAMILY MEDICINE CLINIC | Age: 63
End: 2023-07-13

## 2023-07-13 DIAGNOSIS — R91.1 LUNG NODULE: ICD-10-CM

## 2023-07-13 DIAGNOSIS — R91.8 ABNORMAL CT SCAN OF LUNG: Primary | ICD-10-CM

## 2023-07-13 DIAGNOSIS — F17.200 TOBACCO DEPENDENCE: ICD-10-CM

## 2023-07-13 NOTE — TELEPHONE ENCOUNTER
See note from [facility]:    \"Patient is wanting a call back from a   nurse to discuss a lung test follow up / discuss further testing.  Discuss   MRI, breathing, and vascular test.\"

## 2023-07-13 NOTE — TELEPHONE ENCOUNTER
----- Message from Clarita Monsoncam sent at 7/13/2023 12:58 PM EDT -----  Subject: Appointment Request    Reason for Call: New Patient/New to Provider Appointment needed: Routine   Existing Condition Follow Up    QUESTIONS    Reason for appointment request? Available appointments did not meet   patient need     Additional Information for Provider? Patient is wanting a call back from a   nurse to discuss a lung test follow up / discuss further testing.  Discuss   MRI, breathing, and vascular test.   ---------------------------------------------------------------------------  --------------  Ti MORALES  [phone]; OK to leave message on voicemail,OK to respond with electronic   message via [employer] portal (only for patients who have registered [employer]   account)  ---------------------------------------------------------------------------  --------------  SCRIPT ANSWERS

## 2023-07-17 DIAGNOSIS — K21.9 GASTROESOPHAGEAL REFLUX DISEASE, UNSPECIFIED WHETHER ESOPHAGITIS PRESENT: ICD-10-CM

## 2023-07-17 RX ORDER — OMEPRAZOLE 40 MG/1
40 CAPSULE, DELAYED RELEASE ORAL DAILY
Qty: 90 CAPSULE | Refills: 0 | Status: SHIPPED | OUTPATIENT
Start: 2023-07-17 | End: 2023-10-15

## 2023-07-18 ENCOUNTER — TELEPHONE (OUTPATIENT)
Dept: FAMILY MEDICINE CLINIC | Age: 63
End: 2023-07-18

## 2023-07-18 DIAGNOSIS — R91.1 LUNG NODULE: Primary | ICD-10-CM

## 2023-07-18 RX ORDER — DULOXETIN HYDROCHLORIDE 60 MG/1
60 CAPSULE, DELAYED RELEASE ORAL DAILY
Qty: 30 CAPSULE | Refills: 3 | Status: SHIPPED | OUTPATIENT
Start: 2023-07-18

## 2023-07-18 NOTE — TELEPHONE ENCOUNTER
Called patient to discuss findings  She has a lung nodule that is suspicious for malignancy    Is there a way to expedite a PET scan ? She is willing to go to any imaging location. Referral to pulmonology also ordered.

## 2023-07-19 ENCOUNTER — HOSPITAL ENCOUNTER (OUTPATIENT)
Age: 63
Discharge: HOME | End: 2023-07-19
Payer: MEDICAID

## 2023-07-19 ENCOUNTER — TELEPHONE (OUTPATIENT)
Dept: FAMILY MEDICINE CLINIC | Age: 63
End: 2023-07-19

## 2023-07-19 DIAGNOSIS — I10 ESSENTIAL HYPERTENSION: ICD-10-CM

## 2023-07-19 DIAGNOSIS — F17.200 TOBACCO DEPENDENCE: ICD-10-CM

## 2023-07-19 LAB
ANION GAP SERPL CALCULATED.3IONS-SCNC: 13 MMOL/L (ref 7–16)
BASOPHILS # BLD: 0.12 K/UL (ref 0–0.2)
BASOPHILS NFR BLD: 1 % (ref 0–2)
BUN SERPL-MCNC: 26 MG/DL (ref 6–23)
CALCIUM SERPL-MCNC: 9.2 MG/DL (ref 8.6–10.2)
CHLORIDE SERPL-SCNC: 103 MMOL/L (ref 98–107)
CHOLEST SERPL-MCNC: 290 MG/DL
CO2 SERPL-SCNC: 26 MMOL/L (ref 22–29)
CREAT SERPL-MCNC: 0.8 MG/DL (ref 0.5–1)
EOSINOPHIL # BLD: 0.18 K/UL (ref 0.05–0.5)
EOSINOPHILS RELATIVE PERCENT: 2 % (ref 0–6)
ERYTHROCYTE [DISTWIDTH] IN BLOOD BY AUTOMATED COUNT: 13.1 % (ref 11.5–15)
GFR SERPL CREATININE-BSD FRML MDRD: >60 ML/MIN/1.73M2
GLUCOSE SERPL-MCNC: 103 MG/DL (ref 74–99)
HCT VFR BLD AUTO: 44.8 % (ref 34–48)
HDLC SERPL-MCNC: 48 MG/DL
HGB BLD-MCNC: 14.5 G/DL (ref 11.5–15.5)
IMM GRANULOCYTES # BLD AUTO: 0.04 K/UL (ref 0–0.58)
IMM GRANULOCYTES NFR BLD: 0 % (ref 0–5)
LDLC SERPL CALC-MCNC: 212 MG/DL
LYMPHOCYTES NFR BLD: 2.43 K/UL (ref 1.5–4)
LYMPHOCYTES RELATIVE PERCENT: 23 % (ref 20–42)
MAGNESIUM SERPL-MCNC: 1.7 MG/DL (ref 1.6–2.6)
MCH RBC QN AUTO: 30.4 PG (ref 26–35)
MCHC RBC AUTO-ENTMCNC: 32.4 G/DL (ref 32–34.5)
MCV RBC AUTO: 93.9 FL (ref 80–99.9)
MONOCYTES NFR BLD: 1.12 K/UL (ref 0.1–0.95)
MONOCYTES NFR BLD: 11 % (ref 2–12)
NEUTROPHILS NFR BLD: 64 % (ref 43–80)
NEUTS SEG NFR BLD: 6.82 K/UL (ref 1.8–7.3)
PLATELET # BLD AUTO: 262 K/UL (ref 130–450)
PMV BLD AUTO: 10.9 FL (ref 7–12)
POTASSIUM SERPL-SCNC: 4.6 MMOL/L (ref 3.5–5)
RBC # BLD AUTO: 4.77 M/UL (ref 3.5–5.5)
SODIUM SERPL-SCNC: 142 MMOL/L (ref 132–146)
TRIGL SERPL-MCNC: 149 MG/DL
VLDLC SERPL CALC-MCNC: 30 MG/DL
WBC OTHER # BLD: 10.7 K/UL (ref 4.5–11.5)

## 2023-07-19 PROCEDURE — 83735 ASSAY OF MAGNESIUM: CPT

## 2023-07-19 PROCEDURE — 85027 COMPLETE CBC AUTOMATED: CPT

## 2023-07-19 PROCEDURE — 80061 LIPID PANEL: CPT

## 2023-07-19 PROCEDURE — 36415 COLL VENOUS BLD VENIPUNCTURE: CPT

## 2023-07-19 PROCEDURE — 80048 BASIC METABOLIC PNL TOTAL CA: CPT

## 2023-07-19 NOTE — TELEPHONE ENCOUNTER
Thank you for the update. Please keep me updated on status of pulmonology referral as well.    Clark Chase

## 2023-07-19 NOTE — TELEPHONE ENCOUNTER
Per Dr Aga Reyez, I called to get patient in sooner for the [address] [facility], [facility] can get her in sooner than [facility]. Working on the authorization and will fax when back. Patient notified.

## 2023-07-20 ENCOUNTER — OFFICE VISIT (OUTPATIENT)
Dept: FAMILY MEDICINE CLINIC | Age: 63
End: 2023-07-20

## 2023-07-20 VITALS
DIASTOLIC BLOOD PRESSURE: 69 MMHG | BODY MASS INDEX: 33.87 KG/M2 | SYSTOLIC BLOOD PRESSURE: 105 MMHG | HEIGHT: 57 IN | HEART RATE: 52 BPM | WEIGHT: 157 LBS

## 2023-07-20 DIAGNOSIS — R94.2 ABNORMAL PFT: ICD-10-CM

## 2023-07-20 DIAGNOSIS — G47.00 INSOMNIA, UNSPECIFIED TYPE: ICD-10-CM

## 2023-07-20 DIAGNOSIS — G89.29 CHRONIC MIDLINE LOW BACK PAIN WITH SCIATICA, SCIATICA LATERALITY UNSPECIFIED: ICD-10-CM

## 2023-07-20 DIAGNOSIS — M54.40 CHRONIC MIDLINE LOW BACK PAIN WITH SCIATICA, SCIATICA LATERALITY UNSPECIFIED: ICD-10-CM

## 2023-07-20 DIAGNOSIS — F41.9 ANXIETY: ICD-10-CM

## 2023-07-20 DIAGNOSIS — R91.1 PULMONARY NODULE 1 CM OR GREATER IN DIAMETER: Primary | ICD-10-CM

## 2023-07-20 DIAGNOSIS — R05.3 CHRONIC COUGH: ICD-10-CM

## 2023-07-20 DIAGNOSIS — H81.11 BPPV (BENIGN PAROXYSMAL POSITIONAL VERTIGO), RIGHT: ICD-10-CM

## 2023-07-20 RX ORDER — ROSUVASTATIN CALCIUM 40 MG/1
40 TABLET, COATED ORAL DAILY
Qty: 90 TABLET | OUTPATIENT
Start: 2023-07-20

## 2023-07-20 RX ORDER — MIRTAZAPINE 7.5 MG/1
7.5 TABLET, FILM COATED ORAL NIGHTLY
Qty: 30 TABLET | Refills: 2 | Status: SHIPPED | OUTPATIENT
Start: 2023-07-20

## 2023-07-20 RX ORDER — ROSUVASTATIN CALCIUM 40 MG/1
40 TABLET, COATED ORAL DAILY
Qty: 30 TABLET | Refills: 3 | Status: SHIPPED | OUTPATIENT
Start: 2023-07-20

## 2023-07-20 RX ORDER — LIDOCAINE 50 MG/G
1 PATCH TOPICAL DAILY
Qty: 30 PATCH | Refills: 2 | Status: SHIPPED | OUTPATIENT
Start: 2023-07-20

## 2023-07-20 RX ORDER — HYDROXYZINE PAMOATE 25 MG/1
25 CAPSULE ORAL 3 TIMES DAILY PRN
Qty: 90 CAPSULE | Refills: 1 | Status: SHIPPED | OUTPATIENT
Start: 2023-07-20

## 2023-07-31 ENCOUNTER — TELEPHONE (OUTPATIENT)
Dept: FAMILY MEDICINE CLINIC | Age: 63
End: 2023-07-31

## 2023-07-31 NOTE — TELEPHONE ENCOUNTER
Patient is calling to talk to nursing staff regarding her CT results. She is going for a spine injection today at 10:00 am and wants to know if she should have that done.

## 2023-08-01 NOTE — TELEPHONE ENCOUNTER
Received results from front office - please scan. Spoke with patient    No signs of neoplastic process (no evidence of cancer).

## 2023-09-12 ENCOUNTER — OFFICE VISIT (OUTPATIENT)
Dept: SLEEP CENTER | Age: 63
End: 2023-09-12
Payer: MEDICARE

## 2023-09-12 VITALS
OXYGEN SATURATION: 95 % | SYSTOLIC BLOOD PRESSURE: 119 MMHG | BODY MASS INDEX: 32.89 KG/M2 | RESPIRATION RATE: 16 BRPM | HEART RATE: 48 BPM | HEIGHT: 59 IN | DIASTOLIC BLOOD PRESSURE: 61 MMHG | WEIGHT: 163.14 LBS

## 2023-09-12 DIAGNOSIS — G47.33 OBSTRUCTIVE SLEEP APNEA: Primary | ICD-10-CM

## 2023-09-12 DIAGNOSIS — G47.19 EXCESSIVE DAYTIME SLEEPINESS: ICD-10-CM

## 2023-09-12 PROCEDURE — 99204 OFFICE O/P NEW MOD 45 MIN: CPT | Performed by: NURSE PRACTITIONER

## 2023-09-12 PROCEDURE — G8427 DOCREV CUR MEDS BY ELIG CLIN: HCPCS | Performed by: NURSE PRACTITIONER

## 2023-09-12 PROCEDURE — 3017F COLORECTAL CA SCREEN DOC REV: CPT | Performed by: NURSE PRACTITIONER

## 2023-09-12 PROCEDURE — 3078F DIAST BP <80 MM HG: CPT | Performed by: NURSE PRACTITIONER

## 2023-09-12 PROCEDURE — 4004F PT TOBACCO SCREEN RCVD TLK: CPT | Performed by: NURSE PRACTITIONER

## 2023-09-12 PROCEDURE — 3074F SYST BP LT 130 MM HG: CPT | Performed by: NURSE PRACTITIONER

## 2023-09-12 PROCEDURE — G8417 CALC BMI ABV UP PARAM F/U: HCPCS | Performed by: NURSE PRACTITIONER

## 2023-09-12 ASSESSMENT — SLEEP AND FATIGUE QUESTIONNAIRES
HOW LIKELY ARE YOU TO NOD OFF OR FALL ASLEEP WHILE SITTING QUIETLY AFTER LUNCH WITHOUT ALCOHOL: 3
HOW LIKELY ARE YOU TO NOD OFF OR FALL ASLEEP WHILE SITTING AND TALKING TO SOMEONE: 2
HOW LIKELY ARE YOU TO NOD OFF OR FALL ASLEEP WHILE WATCHING TV: 3
HOW LIKELY ARE YOU TO NOD OFF OR FALL ASLEEP WHEN YOU ARE A PASSENGER IN A CAR FOR AN HOUR WITHOUT A BREAK: 2
ESS TOTAL SCORE: 18
HOW LIKELY ARE YOU TO NOD OFF OR FALL ASLEEP WHILE SITTING INACTIVE IN A PUBLIC PLACE: 2
HOW LIKELY ARE YOU TO NOD OFF OR FALL ASLEEP WHILE LYING DOWN TO REST IN THE AFTERNOON WHEN CIRCUMSTANCES PERMIT: 3
HOW LIKELY ARE YOU TO NOD OFF OR FALL ASLEEP IN A CAR, WHILE STOPPED FOR A FEW MINUTES IN TRAFFIC: 0
HOW LIKELY ARE YOU TO NOD OFF OR FALL ASLEEP WHILE SITTING AND READING: 3

## 2023-09-12 NOTE — PROGRESS NOTES
[address] Sleep Medicine    Patient Name: Travis Olszewski  Age: 66 y.o.   : 1960    Date of Visit: 23      HPI   Travis Olszewski is a 66 y.o. 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), Diverticulosis of sigmoid colon, Duodenal ulcer, Facet arthropathy, Fibrocystic breast changes, Fracture of metatarsal bone, Genital warts, GERD (gastroesophageal reflux disease), Heart attack ([address]) (09/15/2018), History of blood transfusion, History of cervical dysplasia (2013), Hyperlipidemia, Hypertension, IGT (impaired glucose tolerance) (2015), Myocarditis ([address]), Obesity, OLIVIA (obstructive sleep apnea), Osteoarthritis of multiple joints (2013), Osteomyelitis ([address]), Primary insomnia (2015), Protruded cervical disc/DDD with neural foraminal stenosis (6/10/2015), Pulmonary nodule (2016), Sacroiliitis ([address]) (2014), Shingles, Tobacco abuse, and Valvular heart disease (2016). She presents as a new patient to Sleep Clinic, referred by Dr. Ellie Lainez, for Sleep Apnea [facility] consult)   . Patient presents to establish with sleep medicine for management of OLIIVA and sleep disturbances. She was first diagnosed with OLIVIA around  and on CPAP and BIPAP therapy intermittently since. She states she has had difficulty with tolerating mask of PAP device and hasn't used a machine for 2-3 years. She states she would wake up in the middle of the night and rip the mask off unknowingly. Her sleep quality is poor. She feels tired \"all of the time\", not refreshed upon awakening. Sleep schedule is erratic. Sometimes going to bed around 10 pm, other times not until 4-5 am. She then is so tired in the day that she takes about a 4 hour nap daily. Feels she has difficulty performing sedentary tasks without falling asleep.     She takes cymbalta, morphine

## 2023-09-15 ENCOUNTER — TELEPHONE (OUTPATIENT)
Dept: SLEEP CENTER | Age: 63
End: 2023-09-15

## 2023-09-28 ENCOUNTER — OFFICE VISIT (OUTPATIENT)
Dept: FAMILY MEDICINE CLINIC | Age: 63
End: 2023-09-28
Payer: MEDICARE

## 2023-09-28 VITALS
BODY MASS INDEX: 32.86 KG/M2 | TEMPERATURE: 97.6 F | OXYGEN SATURATION: 96 % | HEIGHT: 59 IN | RESPIRATION RATE: 18 BRPM | SYSTOLIC BLOOD PRESSURE: 137 MMHG | HEART RATE: 54 BPM | WEIGHT: 163 LBS | DIASTOLIC BLOOD PRESSURE: 78 MMHG

## 2023-09-28 DIAGNOSIS — I10 ESSENTIAL HYPERTENSION: ICD-10-CM

## 2023-09-28 DIAGNOSIS — I25.10 CAD IN NATIVE ARTERY: Chronic | ICD-10-CM

## 2023-09-28 DIAGNOSIS — G47.33 OSA (OBSTRUCTIVE SLEEP APNEA): ICD-10-CM

## 2023-09-28 DIAGNOSIS — R00.1 BRADYCARDIA: ICD-10-CM

## 2023-09-28 DIAGNOSIS — K21.9 GASTROESOPHAGEAL REFLUX DISEASE, UNSPECIFIED WHETHER ESOPHAGITIS PRESENT: ICD-10-CM

## 2023-09-28 DIAGNOSIS — Z23 NEED FOR INFLUENZA VACCINATION: ICD-10-CM

## 2023-09-28 DIAGNOSIS — I25.10 CAD IN NATIVE ARTERY: Primary | Chronic | ICD-10-CM

## 2023-09-28 DIAGNOSIS — R91.1 PULMONARY NODULE 1 CM OR GREATER IN DIAMETER: ICD-10-CM

## 2023-09-28 PROCEDURE — 3078F DIAST BP <80 MM HG: CPT | Performed by: FAMILY MEDICINE

## 2023-09-28 PROCEDURE — 3017F COLORECTAL CA SCREEN DOC REV: CPT | Performed by: FAMILY MEDICINE

## 2023-09-28 PROCEDURE — G0008 ADMIN INFLUENZA VIRUS VAC: HCPCS | Performed by: FAMILY MEDICINE

## 2023-09-28 PROCEDURE — 99214 OFFICE O/P EST MOD 30 MIN: CPT | Performed by: FAMILY MEDICINE

## 2023-09-28 PROCEDURE — 4004F PT TOBACCO SCREEN RCVD TLK: CPT | Performed by: FAMILY MEDICINE

## 2023-09-28 PROCEDURE — 90674 CCIIV4 VAC NO PRSV 0.5 ML IM: CPT | Performed by: FAMILY MEDICINE

## 2023-09-28 PROCEDURE — 3074F SYST BP LT 130 MM HG: CPT | Performed by: FAMILY MEDICINE

## 2023-09-28 PROCEDURE — G8417 CALC BMI ABV UP PARAM F/U: HCPCS | Performed by: FAMILY MEDICINE

## 2023-09-28 PROCEDURE — G8427 DOCREV CUR MEDS BY ELIG CLIN: HCPCS | Performed by: FAMILY MEDICINE

## 2023-09-28 RX ORDER — CHLORTHALIDONE 25 MG/1
25 TABLET ORAL DAILY
Qty: 90 TABLET | Refills: 0 | Status: SHIPPED | OUTPATIENT
Start: 2023-09-28

## 2023-09-28 RX ORDER — METOPROLOL SUCCINATE 25 MG/1
25 TABLET, EXTENDED RELEASE ORAL DAILY
Qty: 90 TABLET | OUTPATIENT
Start: 2023-09-28

## 2023-09-28 RX ORDER — LOSARTAN POTASSIUM 50 MG/1
50 TABLET ORAL DAILY
Qty: 90 TABLET | Refills: 0 | Status: SHIPPED | OUTPATIENT
Start: 2023-09-28

## 2023-09-28 RX ORDER — OMEPRAZOLE 40 MG/1
40 CAPSULE, DELAYED RELEASE ORAL DAILY
Qty: 90 CAPSULE | Refills: 0 | Status: SHIPPED | OUTPATIENT
Start: 2023-09-28 | End: 2023-12-27

## 2023-09-28 RX ORDER — METOPROLOL SUCCINATE 25 MG/1
25 TABLET, EXTENDED RELEASE ORAL DAILY
Qty: 30 TABLET | Refills: 2 | Status: SHIPPED | OUTPATIENT
Start: 2023-09-28

## 2023-09-28 RX ORDER — OXYCODONE HYDROCHLORIDE 10 MG/1
TABLET ORAL
COMMUNITY
Start: 2023-09-25

## 2023-09-28 RX ORDER — ROSUVASTATIN CALCIUM 40 MG/1
40 TABLET, COATED ORAL DAILY
Qty: 30 TABLET | Refills: 3 | Status: SHIPPED | OUTPATIENT
Start: 2023-09-28

## 2023-09-28 RX ORDER — HYDROXYZINE PAMOATE 25 MG/1
25 CAPSULE ORAL 3 TIMES DAILY PRN
Qty: 90 CAPSULE | Refills: 1 | Status: SHIPPED | OUTPATIENT
Start: 2023-09-28

## 2023-09-28 RX ORDER — MIRTAZAPINE 7.5 MG/1
7.5 TABLET, FILM COATED ORAL NIGHTLY
Qty: 30 TABLET | Refills: 2 | Status: SHIPPED | OUTPATIENT
Start: 2023-09-28

## 2023-09-28 RX ORDER — NICOTINE 21 MG/24HR
1 PATCH, TRANSDERMAL 24 HOURS TRANSDERMAL DAILY
Qty: 30 PATCH | Refills: 1 | Status: SHIPPED | OUTPATIENT
Start: 2023-09-28

## 2023-09-28 NOTE — PROGRESS NOTES
9/28/2023    Tiffany Mendoza is a 66 y.o. female here for   Chief Complaint   Patient presents with    Medication Refill    Results    Health Maintenance     Declines flu shot today     Here for routine f/u  She was would like to go through her test results. One of her concerns is that her heart rate ws in the 40 to 50's previously 60-80's  Unsure when it happens. She prescribed metoprolol - reports taking 50 mg daily  No chest pain today. C/o fatigue. She has appointment for OLIVIA for sleep medicine  Quite smoking 5 months ago  She is vaping t least 10 times daily  Wt Readings from Last 3 Encounters:   09/28/23 163 lb (73.9 kg)   09/12/23 163 lb 2.3 oz (74 kg)   07/20/23 157 lb (71.2 kg)     PET scan reviewed    Insomnia - on remeron/ istaril  Still does not feel like she has quality sleep      She  reports that she has been smoking cigarettes. She started smoking about 42 years ago. She has a 20.00 pack-year smoking history. She has never used smokeless tobacco.    Medications and allergies reviewed and updated in chart.     Current Outpatient Medications   Medication Sig Dispense Refill    diclofenac sodium (VOLTAREN) 1 % GEL APPLY 4 GRAMS TO LOWER BACK FOUR TIMES DAILY AS NEEDED FOR PAIN      oxyCODONE HCl (OXY-IR) 10 MG immediate release tablet 1 tablet as needed Orally every 6 hrs for 30 days      rosuvastatin (CRESTOR) 40 MG tablet Take 1 tablet by mouth daily 30 tablet 3    hydrOXYzine pamoate (VISTARIL) 25 MG capsule Take 1 capsule by mouth 3 times daily as needed for Anxiety 90 capsule 1    mirtazapine (REMERON) 7.5 MG tablet Take 1 tablet by mouth nightly 30 tablet 2    tiotropium (SPIRIVA RESPIMAT) 2.5 MCG/ACT AERS inhaler Inhale 2 puffs into the lungs daily 1 each 2    DULoxetine (CYMBALTA) 60 MG extended release capsule Take 1 capsule by mouth daily 30 capsule 3    omeprazole (PRILOSEC) 40 MG delayed release capsule TAKE 1 CAPSULE BY MOUTH DAILY 90 capsule 0    varenicline (CHANTIX) 0.5 MG tablet

## 2023-10-21 ENCOUNTER — HOSPITAL ENCOUNTER (OUTPATIENT)
Dept: SLEEP CENTER | Age: 63
Discharge: HOME | End: 2023-10-21
Payer: MEDICARE

## 2023-10-21 DIAGNOSIS — G47.33 OBSTRUCTIVE SLEEP APNEA: ICD-10-CM

## 2023-10-21 PROCEDURE — 95810 POLYSOM 6/> YRS 4/> PARAM: CPT

## 2023-11-09 ENCOUNTER — TELEPHONE (OUTPATIENT)
Dept: FAMILY MEDICINE CLINIC | Age: 63
End: 2023-11-09

## 2023-11-09 DIAGNOSIS — R79.89 ELEVATED SERUM CREATININE: Primary | ICD-10-CM

## 2023-11-09 NOTE — TELEPHONE ENCOUNTER
Last Appointment   9/28/2023  Next Appointment  12/8/2023  Tiffany called in regarding labs from [facility], they told her \"something about her kidneys? \" If you can review those and also review her sleep studies and call patient back with results

## 2023-11-09 NOTE — TELEPHONE ENCOUNTER
Attempted to reach patient; no answer on home phone, unable to leave a message at this time (mailbox full)

## 2023-11-09 NOTE — TELEPHONE ENCOUNTER
Reviewed labs from pain management  Her kidney function is a little off  For now I would recommend she come in for a recheck. Also avoid antiinflammaotry medicaitons such as ibuprofen, aleve, motrin, etch which can aggravate the kidneys. I would suggest she touch base with sleep medication regarding her sleep studies.

## 2023-11-10 NOTE — TELEPHONE ENCOUNTER
-- DO NOT REPLY / DO NOT REPLY ALL --  -- Message is from [facility]agement Center [employer] (EC[facility]) --    ONLY TO BE USED WITHIN A REFILL MEDICATION ENCOUNTER    Med Refill  Is the patient currently having any symptoms?: No/Non-Emergent symptoms    Name of medication requested: See pended med    Has patient contacted the pharmacy? No, direct patient to contact pharmacy first as they will be able to process the refill request directly    Is this the first request for the medication in the last 48 hours?: Yes      Patient is requesting a medication refill - medication is on active list      Full name of the provider who ordered the medication: Katelynn Vinson    Clinic site name / Account # for provider: nesset    Preferred Pharmacy: Pharmacy  Pharmacy [place] [address] [address] [facility]    Patient confirmed the above pharmacy as correct?  Yes      Caller Information       Type Contact Phone/Fax    11/10/2023 01:48 PM CST Phone (Incoming) Sujatha Rojas (Self) [phone] (H)          Alternative phone number: [phone]    Can a detailed message be left?: Yes    Patient is completely out of medication: Verify if patient is currently experiencing symptoms. If patient is symptomatic, proceed with front end triage instead of medication refill. If patient is not symptomatic but is completely out of medication, jasvir as High priority when routing. Inform patient: “Please call back with any questions or concerns and if your condition becomes life threatening, you should seek immediate medical assistance by calling 911 or going to the Emergency Department for evaluation.”    Inform all patients: \"If the clinical team needs to contact you regarding this refill, please be aware the return phone call may come from an unidentified or out of state phone number and your refill request will be addressed as soon as the clinical team reviews your message.\"   I called the patient and she confirmed her CT lung screening at [facility] on 7/11/2023 at 3:00 pm.  I reminded the patient to arrive at 2:30 pm, enter through the main entrance, and register. Patient confirmed.         Electronically signed by Kyle Roberson on 7/10/23 at 3:28 PM EDT

## 2023-11-17 RX ORDER — DULOXETIN HYDROCHLORIDE 60 MG/1
60 CAPSULE, DELAYED RELEASE ORAL DAILY
Qty: 30 CAPSULE | Refills: 3 | Status: SHIPPED | OUTPATIENT
Start: 2023-11-17

## 2023-11-24 ENCOUNTER — TELEPHONE (OUTPATIENT)
Dept: FAMILY MEDICINE CLINIC | Age: 63
End: 2023-11-24

## 2023-11-24 NOTE — TELEPHONE ENCOUNTER
Caitlin Carmona  [facility]   Subject: Appointment Request     Reason for Call: Established Patient Appointment needed: Urgent Cough Cold     QUESTIONS     Reason for appointment request? Available appointments did not meet   patient need       Additional Information for Provider? Pt of Dr. Ellie Lainez advised she tested   positive for COVID and having headaches, sore throat. Offered to schedule   office visit. Pt states she had had this before and meds were called into   her pharmacy instead.  Pt prefers meds be called into [facility] on [address].     ---------------------------------------------------------------------------   --------------   [address] [place] [address] [place]   [phone]; OK to leave message on voicemail   ---------------------------------------------------------------------------   --------------   SCRIPT ANSWERS

## 2023-11-24 NOTE — TELEPHONE ENCOUNTER
Paxlovid sent to pharmacy    Hold cholesterol medications while on paxlovid    Offer appointment for f/u if needed

## 2023-11-30 DIAGNOSIS — I10 ESSENTIAL HYPERTENSION: ICD-10-CM

## 2023-11-30 DIAGNOSIS — I25.10 CAD IN NATIVE ARTERY: Chronic | ICD-10-CM

## 2023-11-30 RX ORDER — METOPROLOL SUCCINATE 25 MG/1
25 TABLET, EXTENDED RELEASE ORAL DAILY
Qty: 90 TABLET | Refills: 0 | Status: SHIPPED | OUTPATIENT
Start: 2023-11-30

## 2023-12-06 ENCOUNTER — TELEPHONE (OUTPATIENT)
Dept: SLEEP CENTER | Age: 63
End: 2023-12-06

## 2023-12-12 RX ORDER — ETANERCEPT 50 MG/ML
SOLUTION SUBCUTANEOUS
Qty: 4 ML | OUTPATIENT
Start: 2023-12-12

## 2023-12-14 DIAGNOSIS — I25.10 CAD IN NATIVE ARTERY: Chronic | ICD-10-CM

## 2023-12-14 DIAGNOSIS — I10 ESSENTIAL HYPERTENSION: ICD-10-CM

## 2023-12-14 DIAGNOSIS — K21.9 GASTROESOPHAGEAL REFLUX DISEASE, UNSPECIFIED WHETHER ESOPHAGITIS PRESENT: ICD-10-CM

## 2023-12-15 ENCOUNTER — CLINICAL DOCUMENTATION (OUTPATIENT)
Dept: SPIRITUAL SERVICES | Age: 63
End: 2023-12-15

## 2023-12-15 RX ORDER — OMEPRAZOLE 40 MG/1
40 CAPSULE, DELAYED RELEASE ORAL DAILY
Qty: 90 CAPSULE | Refills: 0 | Status: SHIPPED | OUTPATIENT
Start: 2023-12-15 | End: 2024-03-14

## 2023-12-15 RX ORDER — ROSUVASTATIN CALCIUM 40 MG/1
40 TABLET, COATED ORAL DAILY
Qty: 30 TABLET | Refills: 3 | Status: SHIPPED | OUTPATIENT
Start: 2023-12-15

## 2023-12-15 RX ORDER — CHLORTHALIDONE 25 MG/1
25 TABLET ORAL DAILY
Qty: 90 TABLET | Refills: 0 | Status: SHIPPED | OUTPATIENT
Start: 2023-12-15

## 2023-12-15 RX ORDER — LOSARTAN POTASSIUM 50 MG/1
50 TABLET ORAL DAILY
Qty: 90 TABLET | Refills: 0 | Status: SHIPPED | OUTPATIENT
Start: 2023-12-15

## 2023-12-15 NOTE — ACP (ADVANCE CARE PLANNING)
Advance Care Planning   Ambulatory ACP Specialist Patient Outreach    Date:  12/15/2023    ACP Specialist:  Raúl Rao    Outreach call to patient in follow-up to ACP Specialist referral from:Sally Gardner MD    [x] PCP  [] Provider   [] Ambulatory Care Management [] Other     For:                  [x] Advance Directive Assistance              [] Complete Portable DNR order              [] Complete POST/POLST/MOST              [] Code Status Discussion             [] Discuss Goals of Care             [] Early ACP Decision-Making              [] Other (Specify)    Date Referral Received:15/15/2023    Next Step:   [] ACP scheduled conversation  [x] Outreach again in one week   AFTER HOLIDAYS            [] Email / Mail ACP Info Sheets  [] Email / Mail Advance Directive   [] Closing referral.  Routing closure to referring provider/staff and to ACP Specialist .    [] Closure letter mailed to patient with invitation to contact ACP Specialist if / when ready.   [] Other (Specify here):         [] At this time, Healthcare Decision Maker Is:        [] Primary agent named in scanned advance directive.    [] Legal Next of Kin.     [] Unable to determine legal decision maker at this time.         Outreaches:       [x] 1st -  Date:  13/15/2023               Intervention:  [x] Spoke with Patient   [] Left Voice mail [] Email / Mail    [] Diurnalhart  [] Other (Specify) :     Outcomes:Patient asked to be called for appointment after the new year.           [x] 2nd -  Date:  01/11/2024               Intervention:  [x] Spoke with Patient  [] Left Voice mail [] Email / Mail    [] Diurnalhart  [] Other (Specify) :              Outcomes:Spoke to patient and was asked to call back next day. Called back and patient asked me to call back first of the year after holidays. I also gave her choice of calling when convenient to schedule an appointment,.                [x] 3rd -  Date:  02/03/2024              Intervention:  []

## 2024-02-27 ENCOUNTER — INITIAL CONSULT (OUTPATIENT)
Dept: NEUROSURGERY | Age: 64
End: 2024-02-27
Payer: MEDICARE

## 2024-02-27 VITALS
BODY MASS INDEX: 34.07 KG/M2 | OXYGEN SATURATION: 96 % | HEART RATE: 82 BPM | TEMPERATURE: 97.8 F | HEIGHT: 59 IN | WEIGHT: 169 LBS | DIASTOLIC BLOOD PRESSURE: 94 MMHG | SYSTOLIC BLOOD PRESSURE: 147 MMHG

## 2024-02-27 DIAGNOSIS — M48.061 FORAMINAL STENOSIS OF LUMBAR REGION: ICD-10-CM

## 2024-02-27 DIAGNOSIS — G89.29 CHRONIC BILATERAL LOW BACK PAIN WITH BILATERAL SCIATICA: Primary | ICD-10-CM

## 2024-02-27 DIAGNOSIS — M54.41 CHRONIC BILATERAL LOW BACK PAIN WITH BILATERAL SCIATICA: Primary | ICD-10-CM

## 2024-02-27 DIAGNOSIS — M54.16 LUMBAR RADICULOPATHY: ICD-10-CM

## 2024-02-27 DIAGNOSIS — M54.42 CHRONIC BILATERAL LOW BACK PAIN WITH BILATERAL SCIATICA: Primary | ICD-10-CM

## 2024-02-27 PROCEDURE — 3017F COLORECTAL CA SCREEN DOC REV: CPT | Performed by: STUDENT IN AN ORGANIZED HEALTH CARE EDUCATION/TRAINING PROGRAM

## 2024-02-27 PROCEDURE — 4004F PT TOBACCO SCREEN RCVD TLK: CPT | Performed by: STUDENT IN AN ORGANIZED HEALTH CARE EDUCATION/TRAINING PROGRAM

## 2024-02-27 PROCEDURE — G8482 FLU IMMUNIZE ORDER/ADMIN: HCPCS | Performed by: STUDENT IN AN ORGANIZED HEALTH CARE EDUCATION/TRAINING PROGRAM

## 2024-02-27 PROCEDURE — 3080F DIAST BP >= 90 MM HG: CPT | Performed by: STUDENT IN AN ORGANIZED HEALTH CARE EDUCATION/TRAINING PROGRAM

## 2024-02-27 PROCEDURE — 99202 OFFICE O/P NEW SF 15 MIN: CPT

## 2024-02-27 PROCEDURE — 99203 OFFICE O/P NEW LOW 30 MIN: CPT | Performed by: STUDENT IN AN ORGANIZED HEALTH CARE EDUCATION/TRAINING PROGRAM

## 2024-02-27 PROCEDURE — G8417 CALC BMI ABV UP PARAM F/U: HCPCS | Performed by: STUDENT IN AN ORGANIZED HEALTH CARE EDUCATION/TRAINING PROGRAM

## 2024-02-27 PROCEDURE — 3077F SYST BP >= 140 MM HG: CPT | Performed by: STUDENT IN AN ORGANIZED HEALTH CARE EDUCATION/TRAINING PROGRAM

## 2024-02-27 PROCEDURE — G8427 DOCREV CUR MEDS BY ELIG CLIN: HCPCS | Performed by: STUDENT IN AN ORGANIZED HEALTH CARE EDUCATION/TRAINING PROGRAM

## 2024-02-27 ASSESSMENT — ENCOUNTER SYMPTOMS
PHOTOPHOBIA: 0
ABDOMINAL PAIN: 0
SHORTNESS OF BREATH: 0
TROUBLE SWALLOWING: 0
BACK PAIN: 1
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
## 2024-02-29 ENCOUNTER — TELEPHONE (OUTPATIENT)
Dept: FAMILY MEDICINE CLINIC | Age: 64
End: 2024-02-29

## 2024-02-29 DIAGNOSIS — I10 ESSENTIAL HYPERTENSION: ICD-10-CM

## 2024-02-29 DIAGNOSIS — I25.10 CAD IN NATIVE ARTERY: Chronic | ICD-10-CM

## 2024-02-29 RX ORDER — METOPROLOL SUCCINATE 25 MG/1
25 TABLET, EXTENDED RELEASE ORAL DAILY
Qty: 90 TABLET | Refills: 0 | Status: SHIPPED | OUTPATIENT
Start: 2024-02-29

## 2024-02-29 RX ORDER — DULOXETIN HYDROCHLORIDE 60 MG/1
60 CAPSULE, DELAYED RELEASE ORAL DAILY
Qty: 30 CAPSULE | Refills: 0 | Status: SHIPPED | OUTPATIENT
Start: 2024-02-29

## 2024-02-29 RX ORDER — MIRTAZAPINE 7.5 MG/1
7.5 TABLET, FILM COATED ORAL NIGHTLY
Qty: 30 TABLET | Refills: 0 | Status: SHIPPED | OUTPATIENT
Start: 2024-02-29

## 2024-02-29 NOTE — TELEPHONE ENCOUNTER
Patient states she needs a new referral put in for Rheumatology, patient was seen by neurosurgery and they did not see the ankylosing spondyitis

## 2024-03-15 ENCOUNTER — CLINICAL DOCUMENTATION (OUTPATIENT)
Dept: SPIRITUAL SERVICES | Age: 64
End: 2024-03-15

## 2024-03-15 NOTE — ACP (ADVANCE CARE PLANNING)
Advance Care Planning   Ambulatory ACP Specialist Patient Outreach    Date:  3/15/2024    ACP Specialist:  LILI Soler    Outreach call to patient in follow-up to ACP Specialist referral from:Sally Gardner MD    [x] PCP  [] Provider   [] Ambulatory Care Management [] Other     For:                  [x] Advance Directive Assistance              [] Complete Portable DNR order              [] Complete POST/POLST/MOST              [] Code Status Discussion             [] Discuss Goals of Care             [] Early ACP Decision-Making              [] Other (Specify)    Date Referral Received:12/14/2023    Next Step:   [] ACP scheduled conversation  [] Outreach again in one week               [] Email / Mail ACP Info Sheets  [] Email / Mail Advance Directive   [x] Closing referral.  Routing closure to referring provider/staff and to ACP Specialist .    [x] Closure letter mailed to patient with invitation to contact ACP Specialist if / when ready.   [] Other (Specify here):         [x] At this time, Healthcare Decision Maker Is:        [] Primary agent named in scanned advance directive.    [] Legal Next of Kin.     [x] Unable to determine legal decision maker at this time.         Outreaches:                [x]  Additional Outreach -  Date:  03/15/2024   (Specify Dates & special circumstances):    Outcomes: Chart reviewed for referral closure process. Closure letter sent to pt via [employer]. Referral can be closed.     Thank you for this referral.

## 2024-03-20 DIAGNOSIS — I25.10 CAD IN NATIVE ARTERY: Chronic | ICD-10-CM

## 2024-03-20 DIAGNOSIS — I10 ESSENTIAL HYPERTENSION: ICD-10-CM

## 2024-03-20 RX ORDER — LOSARTAN POTASSIUM 50 MG/1
50 TABLET ORAL DAILY
Qty: 90 TABLET | Refills: 0 | Status: SHIPPED | OUTPATIENT
Start: 2024-03-20

## 2024-03-22 DIAGNOSIS — K21.9 GASTROESOPHAGEAL REFLUX DISEASE, UNSPECIFIED WHETHER ESOPHAGITIS PRESENT: ICD-10-CM

## 2024-03-22 RX ORDER — MIRTAZAPINE 7.5 MG/1
7.5 TABLET, FILM COATED ORAL NIGHTLY
Qty: 90 TABLET | Refills: 1 | Status: SHIPPED | OUTPATIENT
Start: 2024-03-22

## 2024-03-22 RX ORDER — OMEPRAZOLE 40 MG/1
40 CAPSULE, DELAYED RELEASE ORAL DAILY
Qty: 90 CAPSULE | Refills: 1 | Status: SHIPPED | OUTPATIENT
Start: 2024-03-22 | End: 2024-09-18

## 2024-03-24 DIAGNOSIS — I10 ESSENTIAL HYPERTENSION: ICD-10-CM

## 2024-03-25 RX ORDER — CHLORTHALIDONE 25 MG/1
25 TABLET ORAL DAILY
Qty: 90 TABLET | Refills: 0 | Status: SHIPPED | OUTPATIENT
Start: 2024-03-25

## 2024-04-08 RX ORDER — DULOXETIN HYDROCHLORIDE 60 MG/1
60 CAPSULE, DELAYED RELEASE ORAL DAILY
Qty: 30 CAPSULE | Refills: 0 | Status: SHIPPED | OUTPATIENT
Start: 2024-04-08

## 2024-04-12 RX ORDER — HYDROXYZINE PAMOATE 25 MG/1
25 CAPSULE ORAL 3 TIMES DAILY PRN
Qty: 90 CAPSULE | Refills: 1 | Status: SHIPPED | OUTPATIENT
Start: 2024-04-12

## 2024-04-12 RX ORDER — MIRTAZAPINE 7.5 MG/1
7.5 TABLET, FILM COATED ORAL NIGHTLY
Qty: 90 TABLET | Refills: 1 | Status: SHIPPED | OUTPATIENT
Start: 2024-04-12

## 2024-04-25 ENCOUNTER — OFFICE VISIT (OUTPATIENT)
Dept: FAMILY MEDICINE CLINIC | Age: 64
End: 2024-04-25
Payer: MEDICARE

## 2024-04-25 VITALS
OXYGEN SATURATION: 97 % | SYSTOLIC BLOOD PRESSURE: 143 MMHG | HEIGHT: 59 IN | TEMPERATURE: 97.2 F | DIASTOLIC BLOOD PRESSURE: 94 MMHG | WEIGHT: 172.5 LBS | RESPIRATION RATE: 16 BRPM | BODY MASS INDEX: 34.77 KG/M2

## 2024-04-25 DIAGNOSIS — M46.97 INFLAMMATORY SPONDYLOPATHY OF LUMBOSACRAL REGION (HCC): Primary | ICD-10-CM

## 2024-04-25 DIAGNOSIS — I10 ESSENTIAL HYPERTENSION: ICD-10-CM

## 2024-04-25 DIAGNOSIS — R91.1 PULMONARY NODULE 1 CM OR GREATER IN DIAMETER: ICD-10-CM

## 2024-04-25 DIAGNOSIS — I25.10 CAD IN NATIVE ARTERY: Chronic | ICD-10-CM

## 2024-04-25 DIAGNOSIS — R05.3 CHRONIC COUGH: ICD-10-CM

## 2024-04-25 PROCEDURE — 99214 OFFICE O/P EST MOD 30 MIN: CPT | Performed by: FAMILY MEDICINE

## 2024-04-25 PROCEDURE — 3017F COLORECTAL CA SCREEN DOC REV: CPT | Performed by: FAMILY MEDICINE

## 2024-04-25 PROCEDURE — G8427 DOCREV CUR MEDS BY ELIG CLIN: HCPCS | Performed by: FAMILY MEDICINE

## 2024-04-25 PROCEDURE — 3075F SYST BP GE 130 - 139MM HG: CPT | Performed by: FAMILY MEDICINE

## 2024-04-25 PROCEDURE — G8417 CALC BMI ABV UP PARAM F/U: HCPCS | Performed by: FAMILY MEDICINE

## 2024-04-25 PROCEDURE — 4004F PT TOBACCO SCREEN RCVD TLK: CPT | Performed by: FAMILY MEDICINE

## 2024-04-25 PROCEDURE — 3080F DIAST BP >= 90 MM HG: CPT | Performed by: FAMILY MEDICINE

## 2024-04-25 RX ORDER — DULOXETIN HYDROCHLORIDE 60 MG/1
60 CAPSULE, DELAYED RELEASE ORAL DAILY
Qty: 30 CAPSULE | Refills: 0 | Status: SHIPPED | OUTPATIENT
Start: 2024-04-25

## 2024-04-25 RX ORDER — METOPROLOL SUCCINATE 25 MG/1
25 TABLET, EXTENDED RELEASE ORAL DAILY
Qty: 90 TABLET | Refills: 0 | Status: SHIPPED | OUTPATIENT
Start: 2024-04-25

## 2024-04-25 RX ORDER — CYCLOBENZAPRINE HCL 10 MG
10 TABLET ORAL 2 TIMES DAILY
COMMUNITY
Start: 2024-03-28

## 2024-04-25 RX ORDER — CHLORTHALIDONE 25 MG/1
25 TABLET ORAL DAILY
Qty: 90 TABLET | Refills: 0 | Status: SHIPPED | OUTPATIENT
Start: 2024-04-25

## 2024-04-25 RX ORDER — ROSUVASTATIN CALCIUM 40 MG/1
40 TABLET, COATED ORAL DAILY
Qty: 30 TABLET | Refills: 3 | Status: SHIPPED | OUTPATIENT
Start: 2024-04-25

## 2024-04-25 RX ORDER — VARENICLINE TARTRATE 0.5 MG/1
.5-1 TABLET, FILM COATED ORAL SEE ADMIN INSTRUCTIONS
Qty: 57 TABLET | Refills: 0 | Status: SHIPPED | OUTPATIENT
Start: 2024-04-25

## 2024-04-25 RX ORDER — LOSARTAN POTASSIUM 100 MG/1
100 TABLET ORAL DAILY
Qty: 30 TABLET | Refills: 3 | Status: SHIPPED | OUTPATIENT
Start: 2024-04-25

## 2024-04-25 RX ORDER — LOSARTAN POTASSIUM 100 MG/1
100 TABLET ORAL DAILY
Qty: 90 TABLET | OUTPATIENT
Start: 2024-04-25

## 2024-04-25 ASSESSMENT — PATIENT HEALTH QUESTIONNAIRE - PHQ9
SUM OF ALL RESPONSES TO PHQ QUESTIONS 1-9: 2
10. IF YOU CHECKED OFF ANY PROBLEMS, HOW DIFFICULT HAVE THESE PROBLEMS MADE IT FOR YOU TO DO YOUR WORK, TAKE CARE OF THINGS AT HOME, OR GET ALONG WITH OTHER PEOPLE: NOT DIFFICULT AT ALL
SUM OF ALL RESPONSES TO PHQ QUESTIONS 1-9: 2
7. TROUBLE CONCENTRATING ON THINGS, SUCH AS READING THE NEWSPAPER OR WATCHING TELEVISION: NOT AT ALL
2. FEELING DOWN, DEPRESSED OR HOPELESS: NOT AT ALL
3. TROUBLE FALLING OR STAYING ASLEEP: SEVERAL DAYS
1. LITTLE INTEREST OR PLEASURE IN DOING THINGS: NOT AT ALL
8. MOVING OR SPEAKING SO SLOWLY THAT OTHER PEOPLE COULD HAVE NOTICED. OR THE OPPOSITE, BEING SO FIGETY OR RESTLESS THAT YOU HAVE BEEN MOVING AROUND A LOT MORE THAN USUAL: NOT AT ALL
SUM OF ALL RESPONSES TO PHQ QUESTIONS 1-9: 2
5. POOR APPETITE OR OVEREATING: SEVERAL DAYS
4. FEELING TIRED OR HAVING LITTLE ENERGY: NOT AT ALL
9. THOUGHTS THAT YOU WOULD BE BETTER OFF DEAD, OR OF HURTING YOURSELF: NOT AT ALL
SUM OF ALL RESPONSES TO PHQ9 QUESTIONS 1 & 2: 0
6. FEELING BAD ABOUT YOURSELF - OR THAT YOU ARE A FAILURE OR HAVE LET YOURSELF OR YOUR FAMILY DOWN: NOT AT ALL
SUM OF ALL RESPONSES TO PHQ QUESTIONS 1-9: 2

## 2024-04-25 NOTE — PROGRESS NOTES
4/25/2024    Tiffany Ivan is a 63 y.o. female here for   Chief Complaint   Patient presents with    Hypertension    Back Pain     Here for multiple concerns  She has been living alone since she split up with her . He has a lot of medical needs. She has been lonely at times though. Sometimes depressed. Her friend is encouraging her to get a dog. She is considering moving to a new apartment. Wonders if she can have a comfort dog. She would liek to have a small dog. She has been smoking again. She had a scare when she was found to have a pulmonary nodule. She had a PET scan that was reassuring. However she did not go to pulmonologist as referred.   She did go to neurosurgery. Had appointment and was told that to be a cnadidate for surgery she would have to be smoke free. She doesn't really want to have surgery because she has a h/o of complications after surgery including prolonged hospitalization.  She is experiencing constant back pain which is limiting. Follows with pain management.      Regarding pulmonary nodule  Note results from PET scan 7/2023  Lungs and tracheobronchial tree:  0.3 cm right upper lobe nodule on 3:76   with max SUV 0.4.  Approximately 1.0 x 2.1 cm hazy/groundglass opacity on   3:74 is not avid, may be secondary to an indolent lesion or partial   volume averaging artifact.  Recommend follow-up chest CT for these   findings..     She has been confused about her back. Was told that neurosurgery did not see signs of ankyllosing spondylitis. She has been on immunologic therapy for this, but has not seen rheumatology recently.  She has been taking her DMARDS sporadially. Last refilled by our office but advised to schedule with rheumatology  She would like to take better care of her health. Admits that she is not always good about prioritizing her own health. Had been seeing a counselor for a time.      Wt Readings from Last 3 Encounters:   04/25/24 78.2 kg (172 lb 8 oz)   02/27/24 76.7 kg

## 2024-05-12 ENCOUNTER — APPOINTMENT (OUTPATIENT)
Dept: GENERAL RADIOLOGY | Age: 64
End: 2024-05-12
Payer: MEDICARE

## 2024-05-12 ENCOUNTER — HOSPITAL ENCOUNTER (OUTPATIENT)
Age: 64
Setting detail: OBSERVATION
Discharge: HOME | End: 2024-05-13
Attending: INTERNAL MEDICINE | Admitting: INTERNAL MEDICINE
Payer: MEDICARE

## 2024-05-12 DIAGNOSIS — T73.3XXA FATIGUE DUE TO EXCESSIVE EXERTION, INITIAL ENCOUNTER: ICD-10-CM

## 2024-05-12 DIAGNOSIS — R07.9 ACUTE CHEST PAIN: ICD-10-CM

## 2024-05-12 DIAGNOSIS — R06.02 SHORTNESS OF BREATH: ICD-10-CM

## 2024-05-12 DIAGNOSIS — R00.2 PALPITATIONS: ICD-10-CM

## 2024-05-12 DIAGNOSIS — I10 ESSENTIAL HYPERTENSION: ICD-10-CM

## 2024-05-12 DIAGNOSIS — R07.9 CHEST PAIN, UNSPECIFIED TYPE: Primary | ICD-10-CM

## 2024-05-12 LAB
ALBUMIN SERPL-MCNC: 3.9 G/DL (ref 3.5–5.2)
ALP SERPL-CCNC: 104 U/L (ref 35–104)
ALT SERPL-CCNC: 19 U/L (ref 0–32)
ANION GAP SERPL CALCULATED.3IONS-SCNC: 11 MMOL/L (ref 7–16)
AST SERPL-CCNC: 21 U/L (ref 0–31)
BASOPHILS # BLD: 0.12 K/UL (ref 0–0.2)
BASOPHILS NFR BLD: 1 % (ref 0–2)
BILIRUB SERPL-MCNC: <0.2 MG/DL (ref 0–1.2)
BNP SERPL-MCNC: 199 PG/ML (ref 0–125)
BUN SERPL-MCNC: 13 MG/DL (ref 6–23)
CALCIUM SERPL-MCNC: 9.1 MG/DL (ref 8.6–10.2)
CHLORIDE SERPL-SCNC: 99 MMOL/L (ref 98–107)
CO2 SERPL-SCNC: 30 MMOL/L (ref 22–29)
CREAT SERPL-MCNC: 0.9 MG/DL (ref 0.5–1)
EOSINOPHIL # BLD: 0.24 K/UL (ref 0.05–0.5)
EOSINOPHILS RELATIVE PERCENT: 2 % (ref 0–6)
ERYTHROCYTE [DISTWIDTH] IN BLOOD BY AUTOMATED COUNT: 13.9 % (ref 11.5–15)
GFR, ESTIMATED: 73 ML/MIN/1.73M2
GLUCOSE SERPL-MCNC: 137 MG/DL (ref 74–99)
HCT VFR BLD AUTO: 44.8 % (ref 34–48)
HGB BLD-MCNC: 14.7 G/DL (ref 11.5–15.5)
IMM GRANULOCYTES # BLD AUTO: 0.03 K/UL (ref 0–0.58)
IMM GRANULOCYTES NFR BLD: 0 % (ref 0–5)
LYMPHOCYTES NFR BLD: 2.96 K/UL (ref 1.5–4)
LYMPHOCYTES RELATIVE PERCENT: 29 % (ref 20–42)
MCH RBC QN AUTO: 30.3 PG (ref 26–35)
MCHC RBC AUTO-ENTMCNC: 32.8 G/DL (ref 32–34.5)
MCV RBC AUTO: 92.4 FL (ref 80–99.9)
MONOCYTES NFR BLD: 0.96 K/UL (ref 0.1–0.95)
MONOCYTES NFR BLD: 9 % (ref 2–12)
NEUTROPHILS NFR BLD: 58 % (ref 43–80)
NEUTS SEG NFR BLD: 5.89 K/UL (ref 1.8–7.3)
PLATELET # BLD AUTO: 339 K/UL (ref 130–450)
PMV BLD AUTO: 10.2 FL (ref 7–12)
POTASSIUM SERPL-SCNC: 3.7 MMOL/L (ref 3.5–5)
PROT SERPL-MCNC: 7 G/DL (ref 6.4–8.3)
RBC # BLD AUTO: 4.85 M/UL (ref 3.5–5.5)
SODIUM SERPL-SCNC: 140 MMOL/L (ref 132–146)
TROPONIN I SERPL HS-MCNC: 8 NG/L (ref 0–9)
TROPONIN I SERPL HS-MCNC: 8 NG/L (ref 0–9)
WBC OTHER # BLD: 10.2 K/UL (ref 4.5–11.5)

## 2024-05-12 PROCEDURE — 84484 ASSAY OF TROPONIN QUANT: CPT

## 2024-05-12 PROCEDURE — 93005 ELECTROCARDIOGRAM TRACING: CPT

## 2024-05-12 PROCEDURE — 99222 1ST HOSP IP/OBS MODERATE 55: CPT | Performed by: INTERNAL MEDICINE

## 2024-05-12 PROCEDURE — 85025 COMPLETE CBC W/AUTO DIFF WBC: CPT

## 2024-05-12 PROCEDURE — 71046 X-RAY EXAM CHEST 2 VIEWS: CPT

## 2024-05-12 PROCEDURE — G0378 HOSPITAL OBSERVATION PER HR: HCPCS

## 2024-05-12 PROCEDURE — 99285 EMERGENCY DEPT VISIT HI MDM: CPT

## 2024-05-12 PROCEDURE — 83880 ASSAY OF NATRIURETIC PEPTIDE: CPT

## 2024-05-12 PROCEDURE — 80053 COMPREHEN METABOLIC PANEL: CPT

## 2024-05-12 PROCEDURE — 6370000000 HC RX 637 (ALT 250 FOR IP): Performed by: EMERGENCY MEDICINE

## 2024-05-12 RX ORDER — HYDRALAZINE HYDROCHLORIDE 20 MG/ML
10 INJECTION INTRAMUSCULAR; INTRAVENOUS EVERY 6 HOURS PRN
Status: DISCONTINUED | OUTPATIENT
Start: 2024-05-12 | End: 2024-05-13 | Stop reason: HOSPADM

## 2024-05-12 RX ORDER — SODIUM CHLORIDE 0.9 % (FLUSH) 0.9 %
5-40 SYRINGE (ML) INJECTION EVERY 12 HOURS SCHEDULED
Status: DISCONTINUED | OUTPATIENT
Start: 2024-05-12 | End: 2024-05-13 | Stop reason: HOSPADM

## 2024-05-12 RX ORDER — SODIUM CHLORIDE 0.9 % (FLUSH) 0.9 %
5-40 SYRINGE (ML) INJECTION PRN
Status: DISCONTINUED | OUTPATIENT
Start: 2024-05-12 | End: 2024-05-13 | Stop reason: HOSPADM

## 2024-05-12 RX ORDER — MAGNESIUM SULFATE IN WATER 40 MG/ML
2000 INJECTION, SOLUTION INTRAVENOUS PRN
Status: DISCONTINUED | OUTPATIENT
Start: 2024-05-12 | End: 2024-05-13 | Stop reason: HOSPADM

## 2024-05-12 RX ORDER — PANTOPRAZOLE SODIUM 40 MG/1
40 TABLET, DELAYED RELEASE ORAL
Status: DISCONTINUED | OUTPATIENT
Start: 2024-05-13 | End: 2024-05-13 | Stop reason: HOSPADM

## 2024-05-12 RX ORDER — ENOXAPARIN SODIUM 100 MG/ML
40 INJECTION SUBCUTANEOUS DAILY
Status: DISCONTINUED | OUTPATIENT
Start: 2024-05-13 | End: 2024-05-13 | Stop reason: HOSPADM

## 2024-05-12 RX ORDER — ONDANSETRON 2 MG/ML
4 INJECTION INTRAMUSCULAR; INTRAVENOUS EVERY 6 HOURS PRN
Status: DISCONTINUED | OUTPATIENT
Start: 2024-05-12 | End: 2024-05-13 | Stop reason: HOSPADM

## 2024-05-12 RX ORDER — MIRTAZAPINE 15 MG/1
7.5 TABLET, FILM COATED ORAL NIGHTLY
Status: DISCONTINUED | OUTPATIENT
Start: 2024-05-12 | End: 2024-05-13 | Stop reason: HOSPADM

## 2024-05-12 RX ORDER — ACETAMINOPHEN 650 MG/1
650 SUPPOSITORY RECTAL EVERY 6 HOURS PRN
Status: DISCONTINUED | OUTPATIENT
Start: 2024-05-12 | End: 2024-05-13 | Stop reason: HOSPADM

## 2024-05-12 RX ORDER — ROSUVASTATIN CALCIUM 10 MG/1
40 TABLET, COATED ORAL NIGHTLY
Status: DISCONTINUED | OUTPATIENT
Start: 2024-05-12 | End: 2024-05-13 | Stop reason: HOSPADM

## 2024-05-12 RX ORDER — CHLORTHALIDONE 25 MG/1
25 TABLET ORAL DAILY
Status: DISCONTINUED | OUTPATIENT
Start: 2024-05-13 | End: 2024-05-13 | Stop reason: HOSPADM

## 2024-05-12 RX ORDER — ONDANSETRON 4 MG/1
4 TABLET, ORALLY DISINTEGRATING ORAL EVERY 8 HOURS PRN
Status: DISCONTINUED | OUTPATIENT
Start: 2024-05-12 | End: 2024-05-13 | Stop reason: HOSPADM

## 2024-05-12 RX ORDER — ASPIRIN 81 MG/1
81 TABLET, CHEWABLE ORAL DAILY
Status: DISCONTINUED | OUTPATIENT
Start: 2024-05-13 | End: 2024-05-13 | Stop reason: HOSPADM

## 2024-05-12 RX ORDER — ACETAMINOPHEN 325 MG/1
650 TABLET ORAL EVERY 6 HOURS PRN
Status: DISCONTINUED | OUTPATIENT
Start: 2024-05-12 | End: 2024-05-13 | Stop reason: HOSPADM

## 2024-05-12 RX ORDER — LOSARTAN POTASSIUM 50 MG/1
100 TABLET ORAL DAILY
Status: DISCONTINUED | OUTPATIENT
Start: 2024-05-13 | End: 2024-05-13 | Stop reason: HOSPADM

## 2024-05-12 RX ORDER — BENZONATATE 100 MG/1
100 CAPSULE ORAL 3 TIMES DAILY PRN
Status: DISCONTINUED | OUTPATIENT
Start: 2024-05-12 | End: 2024-05-13 | Stop reason: HOSPADM

## 2024-05-12 RX ORDER — ASPIRIN 81 MG/1
324 TABLET, CHEWABLE ORAL ONCE
Status: COMPLETED | OUTPATIENT
Start: 2024-05-12 | End: 2024-05-12

## 2024-05-12 RX ORDER — POTASSIUM CHLORIDE 20 MEQ/1
40 TABLET, EXTENDED RELEASE ORAL PRN
Status: DISCONTINUED | OUTPATIENT
Start: 2024-05-12 | End: 2024-05-13 | Stop reason: HOSPADM

## 2024-05-12 RX ORDER — METOPROLOL SUCCINATE 25 MG/1
25 TABLET, EXTENDED RELEASE ORAL DAILY
Status: DISCONTINUED | OUTPATIENT
Start: 2024-05-13 | End: 2024-05-13 | Stop reason: HOSPADM

## 2024-05-12 RX ORDER — POLYETHYLENE GLYCOL 3350 17 G/17G
17 POWDER, FOR SOLUTION ORAL DAILY PRN
Status: DISCONTINUED | OUTPATIENT
Start: 2024-05-12 | End: 2024-05-13 | Stop reason: HOSPADM

## 2024-05-12 RX ORDER — SODIUM CHLORIDE 9 MG/ML
INJECTION, SOLUTION INTRAVENOUS PRN
Status: DISCONTINUED | OUTPATIENT
Start: 2024-05-12 | End: 2024-05-13 | Stop reason: HOSPADM

## 2024-05-12 RX ORDER — CYCLOBENZAPRINE HCL 10 MG
10 TABLET ORAL 2 TIMES DAILY
Status: DISCONTINUED | OUTPATIENT
Start: 2024-05-12 | End: 2024-05-13 | Stop reason: HOSPADM

## 2024-05-12 RX ORDER — CALCIUM CARBONATE 500 MG/1
500 TABLET, CHEWABLE ORAL 3 TIMES DAILY PRN
Status: DISCONTINUED | OUTPATIENT
Start: 2024-05-12 | End: 2024-05-13 | Stop reason: HOSPADM

## 2024-05-12 RX ORDER — OXYCODONE HYDROCHLORIDE 5 MG/1
10 TABLET ORAL EVERY 6 HOURS PRN
Status: DISCONTINUED | OUTPATIENT
Start: 2024-05-12 | End: 2024-05-13 | Stop reason: HOSPADM

## 2024-05-12 RX ORDER — TIZANIDINE 4 MG/1
4 TABLET ORAL 3 TIMES DAILY PRN
Status: DISCONTINUED | OUTPATIENT
Start: 2024-05-12 | End: 2024-05-12

## 2024-05-12 RX ORDER — HYDROXYZINE PAMOATE 25 MG/1
25 CAPSULE ORAL 3 TIMES DAILY PRN
Status: DISCONTINUED | OUTPATIENT
Start: 2024-05-12 | End: 2024-05-13 | Stop reason: HOSPADM

## 2024-05-12 RX ORDER — LANOLIN ALCOHOL/MO/W.PET/CERES
3 CREAM (GRAM) TOPICAL NIGHTLY PRN
Status: DISCONTINUED | OUTPATIENT
Start: 2024-05-12 | End: 2024-05-13 | Stop reason: HOSPADM

## 2024-05-12 RX ORDER — DULOXETIN HYDROCHLORIDE 60 MG/1
60 CAPSULE, DELAYED RELEASE ORAL DAILY
Status: DISCONTINUED | OUTPATIENT
Start: 2024-05-13 | End: 2024-05-13 | Stop reason: HOSPADM

## 2024-05-12 RX ORDER — POTASSIUM CHLORIDE 7.45 MG/ML
10 INJECTION INTRAVENOUS PRN
Status: DISCONTINUED | OUTPATIENT
Start: 2024-05-12 | End: 2024-05-13 | Stop reason: HOSPADM

## 2024-05-12 RX ADMIN — ASPIRIN 324 MG: 81 TABLET, CHEWABLE ORAL at 19:14

## 2024-05-12 ASSESSMENT — PAIN - FUNCTIONAL ASSESSMENT: PAIN_FUNCTIONAL_ASSESSMENT: 0-10

## 2024-05-12 ASSESSMENT — HEART SCORE: ECG: SIGNIFICANT ST-DEVIATION

## 2024-05-12 ASSESSMENT — PAIN SCALES - GENERAL: PAINLEVEL_OUTOF10: 6

## 2024-05-12 ASSESSMENT — PAIN DESCRIPTION - LOCATION: LOCATION: CHEST

## 2024-05-12 ASSESSMENT — PAIN DESCRIPTION - DESCRIPTORS: DESCRIPTORS: HEAVINESS

## 2024-05-12 NOTE — ED PROVIDER NOTES
[facility] EMERGENCY DEPARTMENT  EMERGENCY DEPARTMENT ENCOUNTER        Pt Name: Tiffany Ivan  MRN: [id number]  Birthdate 1960  Date of evaluation: 5/12/2024  Provider: Balwinder Covington MD  PCP: Sally Gardner MD  Note Started: 6:47 PM EDT 5/12/24    CHIEF COMPLAINT       Chief Complaint   Patient presents with    Chest Pain    Fatigue    Shortness of Breath     Reports last time she had these sxs she had a heart attack and voicing concerns.        HISTORY OF PRESENT ILLNESS: 1 or more Elements        Limitations to history : None    Tiffany Ivan is a 63 y.o. female with past medical history of minimal CAD s/p 2 C on medical management, palpitation, diverticulosis, GERD, vertigo, obesity, ankylosing spondylitis, chronic cough, tobacco abuse, essential hypertension who presents to the ED with complaints of easy fatigability since 1 week and chest heaviness with shortness of breath since last 2 days.    According to patient she started being easily fatigued since last 7 days.  She had to take multiple breaks in between while walking on level ground.  She was feeling tired most of the time.  She started having chest heaviness along with shortness of breath since last 2 days.  Heaviness was mostly in the central part of the chest along with shortness of breath on mild exertion.  She also stated of having orthopnea but denied of PND.  She complained she has cough on a regular basis but no change in amount or color of the phlegm.  She denied of any fever, runny nose, sore throat, headache, dizziness, nausea, vomiting and stated having normal appetite, bowel and bladder movements.  There is no history of leg swelling.   Patient is a chronic smoker smokes around a pack of cigarettes daily.      Nursing Notes were all reviewed and agreed with or any disagreements were addressed in the HPI.      REVIEW OF EXTERNAL NOTE :       None      Chart Review/External Note

## 2024-05-13 ENCOUNTER — APPOINTMENT (OUTPATIENT)
Dept: CT IMAGING | Age: 64
End: 2024-05-13
Payer: MEDICARE

## 2024-05-13 ENCOUNTER — APPOINTMENT (OUTPATIENT)
Dept: NUCLEAR MEDICINE | Age: 64
End: 2024-05-13
Attending: INTERNAL MEDICINE
Payer: MEDICARE

## 2024-05-13 ENCOUNTER — APPOINTMENT (OUTPATIENT)
Age: 64
End: 2024-05-13
Attending: INTERNAL MEDICINE
Payer: MEDICARE

## 2024-05-13 VITALS
WEIGHT: 177 LBS | HEART RATE: 63 BPM | TEMPERATURE: 96.7 F | SYSTOLIC BLOOD PRESSURE: 129 MMHG | DIASTOLIC BLOOD PRESSURE: 73 MMHG | BODY MASS INDEX: 35.68 KG/M2 | RESPIRATION RATE: 18 BRPM | HEIGHT: 59 IN | OXYGEN SATURATION: 96 %

## 2024-05-13 LAB
ALBUMIN SERPL-MCNC: 3.9 G/DL (ref 3.5–5.2)
ALP SERPL-CCNC: 92 U/L (ref 35–104)
ALT SERPL-CCNC: 16 U/L (ref 0–32)
ANION GAP SERPL CALCULATED.3IONS-SCNC: 12 MMOL/L (ref 7–16)
AST SERPL-CCNC: 18 U/L (ref 0–31)
B PARAP IS1001 DNA NPH QL NAA+NON-PROBE: NOT DETECTED
B PERT DNA SPEC QL NAA+PROBE: NOT DETECTED
BILIRUB SERPL-MCNC: 0.3 MG/DL (ref 0–1.2)
BUN SERPL-MCNC: 16 MG/DL (ref 6–23)
C PNEUM DNA NPH QL NAA+NON-PROBE: NOT DETECTED
CALCIUM SERPL-MCNC: 9.5 MG/DL (ref 8.6–10.2)
CHLORIDE SERPL-SCNC: 102 MMOL/L (ref 98–107)
CHOLEST SERPL-MCNC: 159 MG/DL
CO2 SERPL-SCNC: 27 MMOL/L (ref 22–29)
CREAT SERPL-MCNC: 0.9 MG/DL (ref 0.5–1)
ECHO BSA: 1.83 M2
ERYTHROCYTE [DISTWIDTH] IN BLOOD BY AUTOMATED COUNT: 13.9 % (ref 11.5–15)
FLUAV RNA NPH QL NAA+NON-PROBE: NOT DETECTED
FLUBV RNA NPH QL NAA+NON-PROBE: NOT DETECTED
GFR, ESTIMATED: 72 ML/MIN/1.73M2
GLUCOSE SERPL-MCNC: 102 MG/DL (ref 74–99)
HADV DNA NPH QL NAA+NON-PROBE: NOT DETECTED
HBA1C MFR BLD: 6.2 % (ref 4–5.6)
HCOV 229E RNA NPH QL NAA+NON-PROBE: NOT DETECTED
HCOV HKU1 RNA NPH QL NAA+NON-PROBE: NOT DETECTED
HCOV NL63 RNA NPH QL NAA+NON-PROBE: NOT DETECTED
HCOV OC43 RNA NPH QL NAA+NON-PROBE: NOT DETECTED
HCT VFR BLD AUTO: 44.3 % (ref 34–48)
HDLC SERPL-MCNC: 39 MG/DL
HGB BLD-MCNC: 14.5 G/DL (ref 11.5–15.5)
HMPV RNA NPH QL NAA+NON-PROBE: NOT DETECTED
HPIV1 RNA NPH QL NAA+NON-PROBE: NOT DETECTED
HPIV2 RNA NPH QL NAA+NON-PROBE: NOT DETECTED
HPIV3 RNA NPH QL NAA+NON-PROBE: NOT DETECTED
HPIV4 RNA NPH QL NAA+NON-PROBE: NOT DETECTED
LDLC SERPL CALC-MCNC: 90 MG/DL
M PNEUMO DNA NPH QL NAA+NON-PROBE: NOT DETECTED
MAGNESIUM SERPL-MCNC: 2 MG/DL (ref 1.6–2.6)
MCH RBC QN AUTO: 30.1 PG (ref 26–35)
MCHC RBC AUTO-ENTMCNC: 32.7 G/DL (ref 32–34.5)
MCV RBC AUTO: 91.9 FL (ref 80–99.9)
PHOSPHATE SERPL-MCNC: 4.7 MG/DL (ref 2.5–4.5)
PLATELET # BLD AUTO: 302 K/UL (ref 130–450)
PMV BLD AUTO: 10.4 FL (ref 7–12)
POTASSIUM SERPL-SCNC: 4.6 MMOL/L (ref 3.5–5)
PROT SERPL-MCNC: 6.8 G/DL (ref 6.4–8.3)
RBC # BLD AUTO: 4.82 M/UL (ref 3.5–5.5)
RSV RNA NPH QL NAA+NON-PROBE: NOT DETECTED
RV+EV RNA NPH QL NAA+NON-PROBE: NOT DETECTED
SARS-COV-2 RNA NPH QL NAA+NON-PROBE: NOT DETECTED
SODIUM SERPL-SCNC: 141 MMOL/L (ref 132–146)
SPECIMEN DESCRIPTION: NORMAL
STRESS BASELINE DIAS BP: 90 MMHG
STRESS BASELINE HR: 55 BPM
STRESS BASELINE SYS BP: 158 MMHG
STRESS ESTIMATED WORKLOAD: 1 METS
STRESS PEAK DIAS BP: 90 MMHG
STRESS PEAK SYS BP: 158 MMHG
STRESS PERCENT HR ACHIEVED: 56 %
STRESS POST PEAK HR: 89 BPM
STRESS RATE PRESSURE PRODUCT: NORMAL BPM*MMHG
STRESS TARGET HR: 159 BPM
TRIGL SERPL-MCNC: 151 MG/DL
TSH SERPL DL<=0.05 MIU/L-ACNC: 2.05 UIU/ML (ref 0.27–4.2)
VLDLC SERPL CALC-MCNC: 30 MG/DL
WBC OTHER # BLD: 9 K/UL (ref 4.5–11.5)

## 2024-05-13 PROCEDURE — 93242 EXT ECG>48HR<7D RECORDING: CPT

## 2024-05-13 PROCEDURE — 85027 COMPLETE CBC AUTOMATED: CPT

## 2024-05-13 PROCEDURE — G0378 HOSPITAL OBSERVATION PER HR: HCPCS

## 2024-05-13 PROCEDURE — 2580000003 HC RX 258: Performed by: INTERNAL MEDICINE

## 2024-05-13 PROCEDURE — 78452 HT MUSCLE IMAGE SPECT MULT: CPT

## 2024-05-13 PROCEDURE — 84100 ASSAY OF PHOSPHORUS: CPT

## 2024-05-13 PROCEDURE — APPSS180 APP SPLIT SHARED TIME > 60 MINUTES: Performed by: NURSE PRACTITIONER

## 2024-05-13 PROCEDURE — 6360000002 HC RX W HCPCS: Performed by: INTERNAL MEDICINE

## 2024-05-13 PROCEDURE — 6360000004 HC RX CONTRAST MEDICATION: Performed by: RADIOLOGY

## 2024-05-13 PROCEDURE — 93018 CV STRESS TEST I&R ONLY: CPT | Performed by: INTERNAL MEDICINE

## 2024-05-13 PROCEDURE — 99223 1ST HOSP IP/OBS HIGH 75: CPT | Performed by: INTERNAL MEDICINE

## 2024-05-13 PROCEDURE — 0202U NFCT DS 22 TRGT SARS-COV-2: CPT

## 2024-05-13 PROCEDURE — 6370000000 HC RX 637 (ALT 250 FOR IP): Performed by: INTERNAL MEDICINE

## 2024-05-13 PROCEDURE — 93017 CV STRESS TEST TRACING ONLY: CPT

## 2024-05-13 PROCEDURE — 80053 COMPREHEN METABOLIC PANEL: CPT

## 2024-05-13 PROCEDURE — 84443 ASSAY THYROID STIM HORMONE: CPT

## 2024-05-13 PROCEDURE — 6370000000 HC RX 637 (ALT 250 FOR IP): Performed by: STUDENT IN AN ORGANIZED HEALTH CARE EDUCATION/TRAINING PROGRAM

## 2024-05-13 PROCEDURE — 83036 HEMOGLOBIN GLYCOSYLATED A1C: CPT

## 2024-05-13 PROCEDURE — 96372 THER/PROPH/DIAG INJ SC/IM: CPT

## 2024-05-13 PROCEDURE — A9500 TC99M SESTAMIBI: HCPCS | Performed by: RADIOLOGY

## 2024-05-13 PROCEDURE — 80061 LIPID PANEL: CPT

## 2024-05-13 PROCEDURE — 99239 HOSP IP/OBS DSCHRG MGMT >30: CPT | Performed by: STUDENT IN AN ORGANIZED HEALTH CARE EDUCATION/TRAINING PROGRAM

## 2024-05-13 PROCEDURE — 36415 COLL VENOUS BLD VENIPUNCTURE: CPT

## 2024-05-13 PROCEDURE — 93016 CV STRESS TEST SUPVJ ONLY: CPT | Performed by: INTERNAL MEDICINE

## 2024-05-13 PROCEDURE — 83735 ASSAY OF MAGNESIUM: CPT

## 2024-05-13 PROCEDURE — 71275 CT ANGIOGRAPHY CHEST: CPT

## 2024-05-13 PROCEDURE — 78452 HT MUSCLE IMAGE SPECT MULT: CPT | Performed by: INTERNAL MEDICINE

## 2024-05-13 PROCEDURE — 3430000000 HC RX DIAGNOSTIC RADIOPHARMACEUTICAL: Performed by: RADIOLOGY

## 2024-05-13 RX ORDER — BENZONATATE 100 MG/1
100 CAPSULE ORAL 3 TIMES DAILY PRN
Qty: 30 CAPSULE | Refills: 0 | Status: SHIPPED | OUTPATIENT
Start: 2024-05-13 | End: 2024-05-23

## 2024-05-13 RX ORDER — TETRAKIS(2-METHOXYISOBUTYLISOCYANIDE)COPPER(I) TETRAFLUOROBORATE 1 MG/ML
32 INJECTION, POWDER, LYOPHILIZED, FOR SOLUTION INTRAVENOUS
Status: COMPLETED | OUTPATIENT
Start: 2024-05-13 | End: 2024-05-13

## 2024-05-13 RX ORDER — NICOTINE 21 MG/24HR
1 PATCH, TRANSDERMAL 24 HOURS TRANSDERMAL DAILY
Status: DISCONTINUED | OUTPATIENT
Start: 2024-05-13 | End: 2024-05-13 | Stop reason: HOSPADM

## 2024-05-13 RX ORDER — SODIUM CHLORIDE 0.9 % (FLUSH) 0.9 %
10 SYRINGE (ML) INJECTION PRN
Status: DISCONTINUED | OUTPATIENT
Start: 2024-05-13 | End: 2024-05-13 | Stop reason: HOSPADM

## 2024-05-13 RX ORDER — REGADENOSON 0.08 MG/ML
0.4 INJECTION, SOLUTION INTRAVENOUS
Status: COMPLETED | OUTPATIENT
Start: 2024-05-13 | End: 2024-05-13

## 2024-05-13 RX ORDER — ASPIRIN 81 MG/1
81 TABLET, CHEWABLE ORAL DAILY
Qty: 30 TABLET | Refills: 3 | Status: SHIPPED | OUTPATIENT
Start: 2024-05-14

## 2024-05-13 RX ORDER — TETRAKIS(2-METHOXYISOBUTYLISOCYANIDE)COPPER(I) TETRAFLUOROBORATE 1 MG/ML
10 INJECTION, POWDER, LYOPHILIZED, FOR SOLUTION INTRAVENOUS
Status: COMPLETED | OUTPATIENT
Start: 2024-05-13 | End: 2024-05-13

## 2024-05-13 RX ORDER — OXYCODONE AND ACETAMINOPHEN 10; 325 MG/1; MG/1
1 TABLET ORAL EVERY 6 HOURS PRN
COMMUNITY

## 2024-05-13 RX ORDER — CALCIUM CARBONATE 500 MG/1
500 TABLET, CHEWABLE ORAL 3 TIMES DAILY PRN
Qty: 30 TABLET | Refills: 0 | Status: SHIPPED | OUTPATIENT
Start: 2024-05-13 | End: 2024-06-12

## 2024-05-13 RX ORDER — LANOLIN ALCOHOL/MO/W.PET/CERES
3 CREAM (GRAM) TOPICAL NIGHTLY PRN
Qty: 30 TABLET | Refills: 0 | Status: SHIPPED | OUTPATIENT
Start: 2024-05-13 | End: 2024-06-12

## 2024-05-13 RX ADMIN — SODIUM CHLORIDE, PRESERVATIVE FREE 10 ML: 5 INJECTION INTRAVENOUS at 08:21

## 2024-05-13 RX ADMIN — Medication 32 MILLICURIE: at 14:30

## 2024-05-13 RX ADMIN — METOPROLOL SUCCINATE 25 MG: 25 TABLET, EXTENDED RELEASE ORAL at 16:50

## 2024-05-13 RX ADMIN — DULOXETINE HYDROCHLORIDE 60 MG: 60 CAPSULE, DELAYED RELEASE ORAL at 08:19

## 2024-05-13 RX ADMIN — OXYCODONE 10 MG: 5 TABLET ORAL at 05:29

## 2024-05-13 RX ADMIN — CHLORTHALIDONE 25 MG: 25 TABLET ORAL at 08:19

## 2024-05-13 RX ADMIN — DICLOFENAC SODIUM 4 G: 10 GEL TOPICAL at 08:21

## 2024-05-13 RX ADMIN — ACETAMINOPHEN 650 MG: 325 TABLET ORAL at 08:18

## 2024-05-13 RX ADMIN — LOSARTAN POTASSIUM 100 MG: 50 TABLET, FILM COATED ORAL at 08:19

## 2024-05-13 RX ADMIN — PANTOPRAZOLE SODIUM 40 MG: 40 TABLET, DELAYED RELEASE ORAL at 05:30

## 2024-05-13 RX ADMIN — Medication 10.5 MILLICURIE: at 13:20

## 2024-05-13 RX ADMIN — OXYCODONE 10 MG: 5 TABLET ORAL at 16:49

## 2024-05-13 RX ADMIN — REGADENOSON 0.4 MG: 0.08 INJECTION, SOLUTION INTRAVENOUS at 14:34

## 2024-05-13 RX ADMIN — ASPIRIN 81 MG: 81 TABLET, CHEWABLE ORAL at 08:19

## 2024-05-13 RX ADMIN — SODIUM CHLORIDE, PRESERVATIVE FREE 10 ML: 5 INJECTION INTRAVENOUS at 00:44

## 2024-05-13 RX ADMIN — ENOXAPARIN SODIUM 40 MG: 100 INJECTION SUBCUTANEOUS at 08:18

## 2024-05-13 RX ADMIN — CYCLOBENZAPRINE 10 MG: 10 TABLET, FILM COATED ORAL at 06:49

## 2024-05-13 RX ADMIN — IOPAMIDOL 70 ML: 755 INJECTION, SOLUTION INTRAVENOUS at 03:27

## 2024-05-13 ASSESSMENT — PAIN SCALES - GENERAL
PAINLEVEL_OUTOF10: 7
PAINLEVEL_OUTOF10: 5
PAINLEVEL_OUTOF10: 8
PAINLEVEL_OUTOF10: 0
PAINLEVEL_OUTOF10: 1
PAINLEVEL_OUTOF10: 8
PAINLEVEL_OUTOF10: 1
PAINLEVEL_OUTOF10: 8
PAINLEVEL_OUTOF10: 0
PAINLEVEL_OUTOF10: 7
PAINLEVEL_OUTOF10: 5
PAINLEVEL_OUTOF10: 0

## 2024-05-13 ASSESSMENT — PAIN DESCRIPTION - ORIENTATION
ORIENTATION: RIGHT;LEFT
ORIENTATION: LOWER
ORIENTATION: MID;ANTERIOR;POSTERIOR
ORIENTATION: LOWER

## 2024-05-13 ASSESSMENT — PAIN DESCRIPTION - DESCRIPTORS
DESCRIPTORS: ACHING;DISCOMFORT
DESCRIPTORS: ACHING;DULL;DISCOMFORT
DESCRIPTORS: ACHING;DISCOMFORT
DESCRIPTORS: ACHING;DULL;DISCOMFORT
DESCRIPTORS: ACHING

## 2024-05-13 ASSESSMENT — PAIN - FUNCTIONAL ASSESSMENT
PAIN_FUNCTIONAL_ASSESSMENT: ACTIVITIES ARE NOT PREVENTED
PAIN_FUNCTIONAL_ASSESSMENT: PREVENTS OR INTERFERES SOME ACTIVE ACTIVITIES AND ADLS
PAIN_FUNCTIONAL_ASSESSMENT: PREVENTS OR INTERFERES SOME ACTIVE ACTIVITIES AND ADLS

## 2024-05-13 ASSESSMENT — PAIN DESCRIPTION - PAIN TYPE
TYPE: CHRONIC PAIN
TYPE: CHRONIC PAIN

## 2024-05-13 ASSESSMENT — PAIN DESCRIPTION - LOCATION
LOCATION: ABDOMEN
LOCATION: ABDOMEN
LOCATION: BACK
LOCATION: ABDOMEN
LOCATION: BACK
LOCATION: CHEST
LOCATION: BACK

## 2024-05-13 ASSESSMENT — LIFESTYLE VARIABLES
HOW OFTEN DO YOU HAVE A DRINK CONTAINING ALCOHOL: NEVER
HOW MANY STANDARD DRINKS CONTAINING ALCOHOL DO YOU HAVE ON A TYPICAL DAY: PATIENT DOES NOT DRINK

## 2024-05-13 ASSESSMENT — PAIN DESCRIPTION - FREQUENCY
FREQUENCY: CONTINUOUS
FREQUENCY: CONTINUOUS

## 2024-05-13 ASSESSMENT — PAIN DESCRIPTION - ONSET
ONSET: GRADUAL
ONSET: GRADUAL

## 2024-05-13 NOTE — PROGRESS NOTES
Dr. Benjie Jin notified Patient admitted to floor around 4 am. I updated her med list and just want to make you aware that patient takes percocet 325/10 1 every six hours as needed not Oxy IR at home but pharmarceneida stated  may want to keep it separate and just use tylenol if needed extra Also patient takes cymbalta in the evening not in the am. She states flexeril is prn bid but she is using it twice daily recently. She is trying to stop smoking and is on chantix. She also states she needs a nicotine patch. Need Callback: NO CALLBACK REQ 6SE PC

## 2024-05-13 NOTE — PROGRESS NOTES
[facility] Hospitalist Progress Note    SYNOPSIS: Patient admitted on 2024 for Chest pain    This is a 63-year-old lady with past medical history that includes ankylosing spondylitis, anxiety, depression, coronary artery disease, hypertension, hyperlipidemia who presented to [facility] ED on 2024 with complaints of fatigue and chest heaviness associated with shortness of breath for about a week now.  She stated that she feels fatigued along with chest heaviness and shortness of breath which has been worsening since past 2 days.  Aggravated with mild exertion.  She did complains of chronic cough and admits to smoking about half pack of cigarettes per day and trying to quit.      Previous heart cath- Dr. Link performed cardiac catheterization whichrevealed severe sequential lesions in small caliber, right coronary artery,along with normal left ventricular systolic function and moderate lesionand obtuse marginal and circumflex.  There was no intervention performed because of very small caliber of the right coronary artery, and its at most, codominant status.     Troponin 8>8, no significant delta  Lab work including CBC and BMP largely unremarkable  CTA chest negative for pulmonary embolism  Chest x-ray with no acute cardiopulmonary disease.  Spiculated 1 cm nodule in the right upper lobe, which should be followed.  Given her previous history of coronary artery disease she was admitted under observation status for possible stress test and cardiology evaluation.       scheduled for stress test, still does have some chest heaviness and shortness of breath, generalized soreness all over the body.  Await final plans from cardiology.    SUBJECTIVE:  Stable overnight. No other overnight issues reported.   Patient seen and examined  Records reviewed.         Temp (24hrs), Av.7 °F (36.5 °C), Min:97.3 °F (36.3 °C), Max:98 °F (36.7 °C)    DIET: Diet NPO Exceptions are: Sips of Water

## 2024-05-13 NOTE — PLAN OF CARE
Problem: Respiratory - Adult  Goal: Achieves optimal ventilation and oxygenation  Outcome: Progressing     Problem: Cardiovascular - Adult  Goal: Maintains optimal cardiac output and hemodynamic stability  Outcome: Progressing     Problem: Cardiovascular - Adult  Goal: Absence of cardiac dysrhythmias or at baseline  Outcome: Progressing     Problem: Metabolic/Fluid and Electrolytes - Adult  Goal: Electrolytes maintained within normal limits  Outcome: Progressing

## 2024-05-13 NOTE — H&P
GASTROINTESTINAL ENDOSCOPY  8/20/12     Family History   Problem Relation Age of Onset    Arthritis Mother     High Blood Pressure Mother     Rheum Arthritis Mother     High Blood Pressure Father     Other Father         emphysema    Osteoarthritis Father     High Blood Pressure Brother     Depression Daughter     High Blood Pressure Brother     No Known Problems Son        Home Medications  Prior to Admission medications    Medication Sig Start Date End Date Taking? Authorizing Provider   cyclobenzaprine (FLEXERIL) 10 MG tablet Take 1 tablet by mouth 2 times daily 3/28/24   ProviderSimba MD   DULoxetine (CYMBALTA) 60 MG extended release capsule Take 1 capsule by mouth daily 4/25/24   Sally Gardner MD   losartan (COZAAR) 100 MG tablet Take 1 tablet by mouth daily 4/25/24   Sally Gardner MD   metoprolol succinate (TOPROL XL) 25 MG extended release tablet Take 1 tablet by mouth daily 4/25/24   Sally Gardner MD   rosuvastatin (CRESTOR) 40 MG tablet Take 1 tablet by mouth daily 4/25/24   Sally Gardner MD   tiotropium (SPIRIVA RESPIMAT) 2.5 MCG/ACT AERS inhaler Inhale 2 puffs into the lungs daily 4/25/24   Sally Gardner MD   chlorthalidone (HYGROTON) 25 MG tablet Take 1 tablet by mouth daily 4/25/24   Sally Gardner MD   varenicline (CHANTIX) 0.5 MG tablet Take 1-2 tablets by mouth See Admin Instructions 0.5mg DAILY for 3 days followed by 0.5mg TWICE DAILY for 4 days followed by 1mg TWICE DAILY 4/25/24   Sally Gardner MD   mirtazapine (REMERON) 7.5 MG tablet Take 1 tablet by mouth nightly 4/12/24   Sally Gardner MD   hydrOXYzine pamoate (VISTARIL) 25 MG capsule Take 1 capsule by mouth 3 times daily as needed for Anxiety 4/12/24   Sally Gardner MD   omeprazole (PRILOSEC) 40 MG delayed release capsule Take 1 capsule by mouth Daily 3/22/24 9/18/24  Sally Gardner MD   naloxone (NARCAN) 4 MG/0.1ML LIQD nasal spray 1 spray by Nasal route as needed for Opioid Reversal 12/14/23

## 2024-05-13 NOTE — PROGRESS NOTES
4 Eyes Skin Assessment     NAME:  Tiffany Ivan  YOB: 1960  MEDICAL RECORD NUMBER:  [id number]    The patient is being assessed for  Admission    I agree that at least one RN has performed a thorough Head to Toe Skin Assessment on the patient. ALL assessment sites listed below have been assessed.      Areas assessed by both nurses:    Head, Face, Ears, Shoulders, Back, Chest, Arms, Elbows, Hands, Sacrum. Buttock, Coccyx, Ischium, Legs. Feet and Heels, and Under Medical Devices         Does the Patient have a Wound? No noted wound(s)  Small bite like scab bilateral buttock Not pressure          Deni Prevention initiated by RN: Yes  Wound Care Orders initiated by RN: No    Pressure Injury (Stage 3,4, Unstageable, DTI, NWPT, and Complex wounds) if present, place Wound referral order by RN under : No    New Ostomies, if present place, Ostomy referral order under : No     Nurse 1 eSignature: Electronically signed by Lizabeth Ely RN on 5/13/24 at 5:19 AM EDT    **SHARE this note so that the co-signing nurse can place an eSignature**    Nurse 2 eSignature: Electronically signed by Toña Tineo RN on 5/13/24 at 5:25 AM EDT

## 2024-05-13 NOTE — PROGRESS NOTES
Monitor dcd cleaned and placed in slot in nurses station. Discharge instructions and meds reviewed with patient . Patient left with all belongings that were documented that she came in with.

## 2024-05-13 NOTE — CONSULTS
this admission on telemetry.  Obese.  Hypercholesterolemia.  Hypertension.  GERD.  Anxiety/depression      Rec:  Zio for discharge.  I ordered a Lexiscan Cardiolite stress test.  The stress test has now returned with no ischemia and no myocardial infarction with an ejection fraction of 79%.  Heart rate in the low 60s and blood pressure 129/73.  I will not titrate her medications any further today.  Okay for discharge with cardiology.  No further cardiac recommendations or testing    Electronically signed by Alok Hardin DO on 5/13/2024 at 6:19 PM    Note: This report was completed using computerized voice recognition software. Every effort has been made to ensure accuracy, however; and invert and computerized transcription errors may be present.

## 2024-05-14 ENCOUNTER — TELEPHONE (OUTPATIENT)
Dept: FAMILY MEDICINE CLINIC | Age: 64
End: 2024-05-14

## 2024-05-14 LAB
EKG ATRIAL RATE: 73 BPM
EKG P AXIS: 71 DEGREES
EKG P-R INTERVAL: 112 MS
EKG Q-T INTERVAL: 380 MS
EKG QRS DURATION: 88 MS
EKG QTC CALCULATION (BAZETT): 418 MS
EKG R AXIS: 85 DEGREES
EKG T AXIS: 7 DEGREES
EKG VENTRICULAR RATE: 73 BPM

## 2024-05-14 PROCEDURE — 93010 ELECTROCARDIOGRAM REPORT: CPT | Performed by: INTERNAL MEDICINE

## 2024-05-14 NOTE — TELEPHONE ENCOUNTER
Care Transitions Initial Follow Up Call    Outreach made within 2 business days of discharge: Yes    Patient: Tiffany Ivan Patient : 1960   MRN: [id number]  Reason for Admission: Chest pain  Discharge Date: 24       Spoke with: Tiffany    Discharge department/facility: [facility] Interactive Patient Contact:  Was patient able to fill all prescriptions: No: didn't  meds since they were for cough/sore throat and really wasn't having those symptoms  Was patient instructed to bring all medications to the follow-up visit: Yes  Is patient taking all medications as directed in the discharge summary? No, see above  Does patient understand their discharge instructions: No: states did not really explain them and what she was being treated for  Does patient have questions or concerns that need addressed prior to 7-14 day follow up office visit: no    Scheduled appointment with PCP within 7-14 days    Follow Up  Future Appointments   Date Time Provider Department Center   2024 10:50 AM Leyla Monique DO [facility]   2024  4:30 PM SE CT SCAN 3 [facility] Rad[facility]Car   2024 11:45 AM Juve Mccullough MD Jeramie Card [facility]   2024  2:30 PM Sally [facility] MD [facility]   2024  1:00 PM Sy Marks DO [facility]       JENNI OWENS RN

## 2024-05-17 ENCOUNTER — OFFICE VISIT (OUTPATIENT)
Dept: FAMILY MEDICINE CLINIC | Age: 64
End: 2024-05-17

## 2024-05-17 VITALS
OXYGEN SATURATION: 94 % | BODY MASS INDEX: 35.24 KG/M2 | TEMPERATURE: 97.3 F | RESPIRATION RATE: 20 BRPM | SYSTOLIC BLOOD PRESSURE: 120 MMHG | DIASTOLIC BLOOD PRESSURE: 82 MMHG | HEIGHT: 59 IN | HEART RATE: 69 BPM | WEIGHT: 174.82 LBS

## 2024-05-17 DIAGNOSIS — F32.A ANXIETY AND DEPRESSION: Chronic | ICD-10-CM

## 2024-05-17 DIAGNOSIS — K21.9 GASTROESOPHAGEAL REFLUX DISEASE, UNSPECIFIED WHETHER ESOPHAGITIS PRESENT: ICD-10-CM

## 2024-05-17 DIAGNOSIS — I25.10 CAD IN NATIVE ARTERY: Chronic | ICD-10-CM

## 2024-05-17 DIAGNOSIS — I10 ESSENTIAL HYPERTENSION: ICD-10-CM

## 2024-05-17 DIAGNOSIS — F41.9 ANXIETY AND DEPRESSION: Chronic | ICD-10-CM

## 2024-05-17 DIAGNOSIS — R07.9 CHEST PAIN, UNSPECIFIED TYPE: Primary | ICD-10-CM

## 2024-05-17 DIAGNOSIS — R05.3 CHRONIC COUGH: ICD-10-CM

## 2024-05-17 PROCEDURE — 3017F COLORECTAL CA SCREEN DOC REV: CPT

## 2024-05-17 PROCEDURE — G8427 DOCREV CUR MEDS BY ELIG CLIN: HCPCS

## 2024-05-17 PROCEDURE — 4004F PT TOBACCO SCREEN RCVD TLK: CPT

## 2024-05-17 PROCEDURE — G8417 CALC BMI ABV UP PARAM F/U: HCPCS

## 2024-05-17 RX ORDER — MIRTAZAPINE 7.5 MG/1
7.5 TABLET, FILM COATED ORAL NIGHTLY
Qty: 90 TABLET | Refills: 1 | Status: SHIPPED | OUTPATIENT
Start: 2024-05-17

## 2024-05-17 RX ORDER — DULOXETIN HYDROCHLORIDE 60 MG/1
60 CAPSULE, DELAYED RELEASE ORAL DAILY
Qty: 30 CAPSULE | Refills: 0 | Status: SHIPPED | OUTPATIENT
Start: 2024-05-17

## 2024-05-17 RX ORDER — METOPROLOL SUCCINATE 25 MG/1
25 TABLET, EXTENDED RELEASE ORAL DAILY
Qty: 90 TABLET | Refills: 0 | Status: SHIPPED | OUTPATIENT
Start: 2024-05-17

## 2024-05-17 NOTE — PROGRESS NOTES
[facility]  [facility]    Subjective:  Hospital follow up for chest pain  Stress test negative  CTA negative  Continues to complain about chest pain and dyspnea  Wearing a heart monitor    ROS otherwise negative    Past medical, surgical, family and social history were reviewed, non-contributory, and unchanged unless otherwise stated.    Objective:    /82   Pulse 69   Temp 97.3 °F (36.3 °C)   Resp 20   Ht 1.499 m (4' 11\")   Wt 79.3 kg (174 lb 13.2 oz)   SpO2 94%   BMI 35.31 kg/m²     Exam is as noted by resident with the following changes, additions or corrections:        Assessment/Plan:    Chest pain - complete monitor. Work up thus far negative. Send for EGD as could be GERD component. See resident note for details.     Attending Physician Statement  I have reviewed the chart, including any radiology or labs.  I agree with the assessment, plan and orders as documented by the resident.  Please refer to the resident note for additional information.      Electronically signed by MICHAEL OSBORN MD on 5/17/2024 at 11:46 AM    
control.  - Follow up with PCP    2. CAD in native artery  - metoprolol succinate (TOPROL XL) 25 MG extended release tablet; Take 1 tablet by mouth daily  Dispense: 90 tablet; Refill: 0  05/13/2024: Stress ejection fraction of 79%. The left ventricle is normal in size. No patient motion or attenuation are identified. Tomographic images demonstrate a normal distribution of tracer uptake   - [facility] 2020: CAD, Medical management at this moment. Diffuse in nature and vasculature is small caliber. Absolute nicotine cessation with aggressive tisk factor control.  - Follow up with specialist    3. Gastroesophageal reflux disease, unspecified whether esophagitis present  - Ordered EGD, last 2012 in view of dysphagia and chest pain  - Moi Mayorga MD, General Surgery, [place]    4. Essential hypertension - controlled  - Continue with medical management  - metoprolol succinate (TOPROL XL) 25 MG extended release tablet; Take 1 tablet by mouth daily  Dispense: 90 tablet; Refill: 0  - Follow up with PCP      Counseled regarding above diagnosis, including possible risks and complications,  especially if left uncontrolled. Counseled regarding the possible side effects, risks, benefits and alternatives to treatment;patient and/or guardian verbalizes understanding, agrees, feels comfortable with and wishes to proceed with above treatment plan.    Call or go to EDimmediately if symptoms worsen or persist. Advised patient to call with any new medication issues, and, as applicable, read all Rx info from pharmacy to assure aware of all possible risks and side effects of medicationbefore taking.     Patient and/or guardian given opportunity to ask questions/raise concerns.  The patient verbalized comfort and understanding ofinstructions.    I encourage further reading and education about your health conditions.  Information on many health conditions is provided by theAmerican Academy of Family Physicians:

## 2024-05-22 ENCOUNTER — HOSPITAL ENCOUNTER (OUTPATIENT)
Dept: CT IMAGING | Age: 64
Discharge: HOME | End: 2024-05-24
Payer: MEDICARE

## 2024-05-22 DIAGNOSIS — R91.1 PULMONARY NODULE 1 CM OR GREATER IN DIAMETER: ICD-10-CM

## 2024-05-22 DIAGNOSIS — R05.3 CHRONIC COUGH: ICD-10-CM

## 2024-05-22 PROCEDURE — 71250 CT THORAX DX C-: CPT

## 2024-07-09 ENCOUNTER — TELEPHONE (OUTPATIENT)
Dept: CARDIOLOGY CLINIC | Age: 64
End: 2024-07-09

## 2024-07-09 NOTE — TELEPHONE ENCOUNTER
----- Message from Juve Mccullough MD sent at 7/9/2024  7:37 AM EDT -----  See my note below  Would need ov for me to know what to do with it      ----- Message -----  From: Inocencia Gaviria  Sent: 7/8/2024   5:06 PM EDT  To: Juve Mccullough MD    WCS saw in consult in May and ordered this     ----- Message -----  From: Juve Mccullough MD  Sent: 7/8/2024  12:15 PM EDT  To: Inocencia Gaviria    I havent seen her for a year, and I dont see any note as to you and me ordering this  Would need ov for me to know what to do with it    ----- Message -----  From: Florence Babcock  Sent: 7/8/2024   9:13 AM EDT  To: Juve Mccullough MD      ----- Message -----  From: Alok Hardin DO  Sent: 7/5/2024   6:56 PM EDT  To: Florence Babcock    She is not my patient

## 2024-07-31 ENCOUNTER — OFFICE VISIT (OUTPATIENT)
Dept: FAMILY MEDICINE CLINIC | Age: 64
End: 2024-07-31
Payer: MEDICARE

## 2024-07-31 VITALS
RESPIRATION RATE: 17 BRPM | HEART RATE: 71 BPM | BODY MASS INDEX: 36.29 KG/M2 | TEMPERATURE: 97.2 F | OXYGEN SATURATION: 95 % | DIASTOLIC BLOOD PRESSURE: 67 MMHG | WEIGHT: 180 LBS | SYSTOLIC BLOOD PRESSURE: 113 MMHG | HEIGHT: 59 IN

## 2024-07-31 DIAGNOSIS — I10 ESSENTIAL HYPERTENSION: ICD-10-CM

## 2024-07-31 DIAGNOSIS — F32.A ANXIETY AND DEPRESSION: Chronic | ICD-10-CM

## 2024-07-31 DIAGNOSIS — I25.10 CAD IN NATIVE ARTERY: Chronic | ICD-10-CM

## 2024-07-31 DIAGNOSIS — F17.200 TOBACCO DEPENDENCE: ICD-10-CM

## 2024-07-31 DIAGNOSIS — Z12.31 ENCOUNTER FOR SCREENING MAMMOGRAM FOR MALIGNANT NEOPLASM OF BREAST: ICD-10-CM

## 2024-07-31 DIAGNOSIS — F41.9 ANXIETY AND DEPRESSION: Chronic | ICD-10-CM

## 2024-07-31 DIAGNOSIS — R91.8 MULTIPLE LUNG NODULES ON CT: Primary | ICD-10-CM

## 2024-07-31 DIAGNOSIS — R05.3 CHRONIC COUGH: ICD-10-CM

## 2024-07-31 PROCEDURE — 3017F COLORECTAL CA SCREEN DOC REV: CPT | Performed by: FAMILY MEDICINE

## 2024-07-31 PROCEDURE — 99214 OFFICE O/P EST MOD 30 MIN: CPT | Performed by: FAMILY MEDICINE

## 2024-07-31 PROCEDURE — G8427 DOCREV CUR MEDS BY ELIG CLIN: HCPCS | Performed by: FAMILY MEDICINE

## 2024-07-31 PROCEDURE — 3074F SYST BP LT 130 MM HG: CPT | Performed by: FAMILY MEDICINE

## 2024-07-31 PROCEDURE — G8417 CALC BMI ABV UP PARAM F/U: HCPCS | Performed by: FAMILY MEDICINE

## 2024-07-31 PROCEDURE — 3078F DIAST BP <80 MM HG: CPT | Performed by: FAMILY MEDICINE

## 2024-07-31 PROCEDURE — 4004F PT TOBACCO SCREEN RCVD TLK: CPT | Performed by: FAMILY MEDICINE

## 2024-07-31 RX ORDER — ROSUVASTATIN CALCIUM 40 MG/1
40 TABLET, COATED ORAL DAILY
Qty: 30 TABLET | Refills: 3 | Status: SHIPPED | OUTPATIENT
Start: 2024-07-31

## 2024-07-31 RX ORDER — ASPIRIN 81 MG/1
81 TABLET, CHEWABLE ORAL DAILY
Qty: 30 TABLET | Refills: 11 | Status: SHIPPED | OUTPATIENT
Start: 2024-07-31

## 2024-07-31 RX ORDER — CHLORTHALIDONE 25 MG/1
25 TABLET ORAL DAILY
Qty: 90 TABLET | Refills: 0 | Status: SHIPPED | OUTPATIENT
Start: 2024-07-31

## 2024-07-31 RX ORDER — NICOTINE 21 MG/24HR
1 PATCH, TRANSDERMAL 24 HOURS TRANSDERMAL DAILY
Qty: 30 PATCH | Refills: 2 | Status: SHIPPED | OUTPATIENT
Start: 2024-07-31

## 2024-07-31 RX ORDER — METOPROLOL SUCCINATE 25 MG/1
25 TABLET, EXTENDED RELEASE ORAL DAILY
Qty: 90 TABLET | Refills: 0 | Status: SHIPPED | OUTPATIENT
Start: 2024-07-31

## 2024-07-31 RX ORDER — DULOXETIN HYDROCHLORIDE 60 MG/1
60 CAPSULE, DELAYED RELEASE ORAL DAILY
Qty: 30 CAPSULE | Refills: 2 | Status: SHIPPED | OUTPATIENT
Start: 2024-07-31

## 2024-07-31 RX ORDER — VARENICLINE TARTRATE 1 MG/1
1 TABLET, FILM COATED ORAL 2 TIMES DAILY
Qty: 60 TABLET | Refills: 3 | Status: SHIPPED | OUTPATIENT
Start: 2024-07-31

## 2024-07-31 RX ORDER — LOSARTAN POTASSIUM 100 MG/1
100 TABLET ORAL DAILY
Qty: 30 TABLET | Refills: 3 | Status: SHIPPED | OUTPATIENT
Start: 2024-07-31

## 2024-07-31 NOTE — PROGRESS NOTES
7/31/2024    Tiffany Ivan is a 63 y.o. female here for   Chief Complaint   Patient presents with    Back Pain    Other    Results     Here for f/u and management of chronic medical conditions. She is frustrated with herself because she is back to smoking. She knows she should quit, can list various reasons for quitting but feels like she doesn't have anything else to do. Ongoing stressors related to being sepaated from . She has had some improvement with patches/ chantix, vut she has not started chantix. She is considering starting it. She has gained weight which she attributes to snacking more, 'stress eating'.     Reviewed results of CT scan.  See results below.  She has not yet seen pulmonology.      CT chest 5/22/24  IMPRESSION:  1. Stable pulmonary nodules ranging from 3-5 mm. No new or dominant nodule.  Previously described area on 07/11/2023 examination of the right upper lobe  anteriorly has largely resolved with only minimal scarring remaining at this  site.  2. Multiple pulmonary nodules.  CT chest recommended for follow-up at 6 months  3. No acute airspace disease     RECOMMENDATIONS:  Lung rads category 3 follow-up recommended CT chest 6-12 months low suspicion        Wt Readings from Last 3 Encounters:   07/31/24 81.6 kg (180 lb)   05/17/24 79.3 kg (174 lb 13.2 oz)   05/13/24 80.3 kg (177 lb)     Reports hot flashes.     She  reports that she has been smoking cigarettes. She started smoking about 43 years ago. She has a 20.5 pack-year smoking history. She has never been exposed to tobacco smoke. She has never used smokeless tobacco.    Medications and allergies reviewed and updated in chart.    Current Outpatient Medications   Medication Sig Dispense Refill    DULoxetine (CYMBALTA) 60 MG extended release capsule Take 1 capsule by mouth daily 30 capsule 0    metoprolol succinate (TOPROL XL) 25 MG extended release tablet Take 1 tablet by mouth daily 90 tablet 0    tiotropium (SPIRIVA RESPIMAT)

## 2024-08-05 ENCOUNTER — OFFICE VISIT (OUTPATIENT)
Dept: CARDIOLOGY CLINIC | Age: 64
End: 2024-08-05
Payer: MEDICARE

## 2024-08-05 VITALS
WEIGHT: 181.6 LBS | RESPIRATION RATE: 16 BRPM | HEART RATE: 59 BPM | HEIGHT: 59 IN | BODY MASS INDEX: 36.61 KG/M2 | DIASTOLIC BLOOD PRESSURE: 82 MMHG | SYSTOLIC BLOOD PRESSURE: 149 MMHG

## 2024-08-05 DIAGNOSIS — I25.10 CAD IN NATIVE ARTERY: Primary | Chronic | ICD-10-CM

## 2024-08-05 DIAGNOSIS — R00.2 PALPITATIONS: ICD-10-CM

## 2024-08-05 PROBLEM — M46.97 INFLAMMATORY SPONDYLOPATHY OF LUMBOSACRAL REGION (HCC): Status: RESOLVED | Noted: 2020-12-04 | Resolved: 2024-08-05

## 2024-08-05 PROBLEM — R07.9 CHEST PAIN: Status: RESOLVED | Noted: 2024-05-12 | Resolved: 2024-08-05

## 2024-08-05 PROBLEM — R41.3 MEMORY PROBLEM: Status: RESOLVED | Noted: 2022-02-07 | Resolved: 2024-08-05

## 2024-08-05 PROCEDURE — 99214 OFFICE O/P EST MOD 30 MIN: CPT | Performed by: INTERNAL MEDICINE

## 2024-08-05 PROCEDURE — 3017F COLORECTAL CA SCREEN DOC REV: CPT | Performed by: INTERNAL MEDICINE

## 2024-08-05 PROCEDURE — 3077F SYST BP >= 140 MM HG: CPT | Performed by: INTERNAL MEDICINE

## 2024-08-05 PROCEDURE — G8417 CALC BMI ABV UP PARAM F/U: HCPCS | Performed by: INTERNAL MEDICINE

## 2024-08-05 PROCEDURE — G8427 DOCREV CUR MEDS BY ELIG CLIN: HCPCS | Performed by: INTERNAL MEDICINE

## 2024-08-05 PROCEDURE — 4004F PT TOBACCO SCREEN RCVD TLK: CPT | Performed by: INTERNAL MEDICINE

## 2024-08-05 PROCEDURE — 3079F DIAST BP 80-89 MM HG: CPT | Performed by: INTERNAL MEDICINE

## 2024-08-05 PROCEDURE — 93000 ELECTROCARDIOGRAM COMPLETE: CPT | Performed by: INTERNAL MEDICINE

## 2024-08-05 NOTE — PROGRESS NOTES
Chief Complaint   Patient presents with    Coronary Artery Disease    Palpitations       Patient Active Problem List    Diagnosis Date Noted    Episode of recurrent major depressive disorder (HCC) 02/07/2022    History of ankylosing spondylitis 12/29/2021    Palpitations 12/22/2021     Overview Note:     A. Monitor 1/2022: one ten beat SVT   B. MCOT 5/2024: possible Atach      Obesity     Vertigo 03/21/2019    CAD in native artery      Overview Note:     A. \"ACS\" 2018 (RV):  \" severe sequential lesions small non dominant RCA\"  B.  Cath 9/25/20 ([facility]):  \"70% small OM\"  B. Normal lexiscan 5/2024      Mixed hyperlipidemia 09/22/2018    OLIVIA (obstructive sleep apnea) 04/05/2018    Pulmonary nodule 09/27/2016    Essential hypertension 11/11/2015    Diverticulosis of sigmoid colon     GERD (gastroesophageal reflux disease)        Current Outpatient Medications   Medication Sig Dispense Refill    losartan (COZAAR) 100 MG tablet Take 1 tablet by mouth daily 30 tablet 3    metoprolol succinate (TOPROL XL) 25 MG extended release tablet Take 1 tablet by mouth daily 90 tablet 0    rosuvastatin (CRESTOR) 40 MG tablet Take 1 tablet by mouth daily 30 tablet 3    tiotropium (SPIRIVA RESPIMAT) 2.5 MCG/ACT AERS inhaler Inhale 2 puffs into the lungs daily 1 each 2    varenicline (CHANTIX CONTINUING MONTH MARICEL) 1 MG tablet Take 1 tablet by mouth 2 times daily 60 tablet 3    DULoxetine (CYMBALTA) 60 MG extended release capsule Take 1 capsule by mouth daily 30 capsule 2    chlorthalidone (HYGROTON) 25 MG tablet Take 1 tablet by mouth daily 90 tablet 0    aspirin 81 MG chewable tablet Take 1 tablet by mouth daily 30 tablet 11    mirtazapine (REMERON) 7.5 MG tablet Take 1 tablet by mouth nightly 90 tablet 1    oxyCODONE-acetaminophen (PERCOCET)  MG per tablet Take 1 tablet by mouth every 6 hours as needed for Pain.      melatonin 3 MG TABS tablet Take 1 tablet by mouth nightly as needed (insomnia) 30 tablet 0    cyclobenzaprine

## 2024-10-03 ENCOUNTER — HOSPITAL ENCOUNTER (OUTPATIENT)
Dept: GENERAL RADIOLOGY | Age: 64
Discharge: HOME | End: 2024-10-05
Payer: MEDICARE

## 2024-10-03 VITALS — HEIGHT: 57 IN | BODY MASS INDEX: 34.52 KG/M2 | WEIGHT: 160 LBS

## 2024-10-03 DIAGNOSIS — Z12.31 ENCOUNTER FOR SCREENING MAMMOGRAM FOR MALIGNANT NEOPLASM OF BREAST: ICD-10-CM

## 2024-10-03 PROCEDURE — 77063 BREAST TOMOSYNTHESIS BI: CPT

## 2024-10-04 ENCOUNTER — OFFICE VISIT (OUTPATIENT)
Dept: FAMILY MEDICINE CLINIC | Age: 64
End: 2024-10-04
Payer: MEDICARE

## 2024-10-04 VITALS
TEMPERATURE: 97.2 F | WEIGHT: 172 LBS | RESPIRATION RATE: 18 BRPM | OXYGEN SATURATION: 98 % | HEART RATE: 92 BPM | HEIGHT: 57 IN | SYSTOLIC BLOOD PRESSURE: 129 MMHG | BODY MASS INDEX: 37.11 KG/M2 | DIASTOLIC BLOOD PRESSURE: 88 MMHG

## 2024-10-04 DIAGNOSIS — R23.2 HOT FLASHES: Primary | ICD-10-CM

## 2024-10-04 DIAGNOSIS — K21.9 GASTROESOPHAGEAL REFLUX DISEASE, UNSPECIFIED WHETHER ESOPHAGITIS PRESENT: ICD-10-CM

## 2024-10-04 PROCEDURE — 4004F PT TOBACCO SCREEN RCVD TLK: CPT | Performed by: STUDENT IN AN ORGANIZED HEALTH CARE EDUCATION/TRAINING PROGRAM

## 2024-10-04 PROCEDURE — G8417 CALC BMI ABV UP PARAM F/U: HCPCS | Performed by: STUDENT IN AN ORGANIZED HEALTH CARE EDUCATION/TRAINING PROGRAM

## 2024-10-04 PROCEDURE — G8484 FLU IMMUNIZE NO ADMIN: HCPCS | Performed by: STUDENT IN AN ORGANIZED HEALTH CARE EDUCATION/TRAINING PROGRAM

## 2024-10-04 PROCEDURE — 3017F COLORECTAL CA SCREEN DOC REV: CPT | Performed by: STUDENT IN AN ORGANIZED HEALTH CARE EDUCATION/TRAINING PROGRAM

## 2024-10-04 PROCEDURE — 99213 OFFICE O/P EST LOW 20 MIN: CPT | Performed by: STUDENT IN AN ORGANIZED HEALTH CARE EDUCATION/TRAINING PROGRAM

## 2024-10-04 PROCEDURE — 3079F DIAST BP 80-89 MM HG: CPT | Performed by: STUDENT IN AN ORGANIZED HEALTH CARE EDUCATION/TRAINING PROGRAM

## 2024-10-04 PROCEDURE — 3074F SYST BP LT 130 MM HG: CPT | Performed by: STUDENT IN AN ORGANIZED HEALTH CARE EDUCATION/TRAINING PROGRAM

## 2024-10-04 PROCEDURE — G8427 DOCREV CUR MEDS BY ELIG CLIN: HCPCS | Performed by: STUDENT IN AN ORGANIZED HEALTH CARE EDUCATION/TRAINING PROGRAM

## 2024-10-04 RX ORDER — OMEPRAZOLE 40 MG/1
40 CAPSULE, DELAYED RELEASE ORAL DAILY
Qty: 90 CAPSULE | Refills: 0 | Status: SHIPPED | OUTPATIENT
Start: 2024-10-04 | End: 2025-04-02

## 2024-10-04 RX ORDER — GABAPENTIN 100 MG/1
CAPSULE ORAL
Qty: 50 CAPSULE | Refills: 0 | Status: SHIPPED | OUTPATIENT
Start: 2024-10-04 | End: 2024-11-03

## 2024-10-04 SDOH — ECONOMIC STABILITY: FOOD INSECURITY: WITHIN THE PAST 12 MONTHS, YOU WORRIED THAT YOUR FOOD WOULD RUN OUT BEFORE YOU GOT MONEY TO BUY MORE.: NEVER TRUE

## 2024-10-04 SDOH — ECONOMIC STABILITY: FOOD INSECURITY: WITHIN THE PAST 12 MONTHS, THE FOOD YOU BOUGHT JUST DIDN'T LAST AND YOU DIDN'T HAVE MONEY TO GET MORE.: NEVER TRUE

## 2024-10-04 SDOH — ECONOMIC STABILITY: INCOME INSECURITY: HOW HARD IS IT FOR YOU TO PAY FOR THE VERY BASICS LIKE FOOD, HOUSING, MEDICAL CARE, AND HEATING?: NOT HARD AT ALL

## 2024-10-04 NOTE — PROGRESS NOTES
S: 64 y.o. female with   Chief Complaint   Patient presents with    Hot Flashes    Health Maintenance     Declines vaccines       Pt is here bc of her hot flashes.  This has been going on for the last year. She can't take estrogen bc of her cardiac history.  No vaginal bleeding and she had a partial hysterectomy.      O: VS:  height is 1.448 m (4' 9.01\") and weight is 78 kg (172 lb). Her temporal temperature is 97.2 °F (36.2 °C). Her blood pressure is 129/88 and her pulse is 92. Her respiration is 18 and oxygen saturation is 98%.   BP Readings from Last 3 Encounters:   10/04/24 129/88   08/05/24 (!) 149/82   07/31/24 113/67     See resident note      Impression/Plan:   1) hot flashes - try gabapentin.  Pt already on SNRI.        Health Maintenance Due   Topic Date Due    Respiratory Syncytial Virus (RSV) Pregnant or age 60 yrs+ (1 - 1-dose 60+ series) Never done    Shingles vaccine (2 of 2) 07/25/2023    Annual Wellness Visit (Medicare Advantage)  01/01/2024    Flu vaccine (1) 08/01/2024    COVID-19 Vaccine (3 - 2023-24 season) 09/01/2024         Attending Physician Statement  I have discussed the case, including pertinent history and exam findings with the resident.  I agree with the documented assessment and plan.      Ning Lozada MD  
comfortable with and wishes to proceed with above treatment plan.    Call or go to ED immediately if symptoms worsen or persist. Advised patient to call with any new medication issues, and, as applicable, read all Rx info from pharmacy to assure aware of all possible risks and side effects of medication before taking.     Patient and/or guardian given opportunity to ask questions/raise concerns.The patient verbalized comfort and understanding of instructions.    I encourage further reading and education about your health conditions.Information on many health conditions is provided by the American Academy of Family Physicians: https://familydoctor.org/  Please bring any questions to me at your next visit.    Medication List:    Current Outpatient Medications   Medication Sig Dispense Refill    omeprazole (PRILOSEC) 40 MG delayed release capsule Take 1 capsule by mouth Daily 90 capsule 0    gabapentin (NEURONTIN) 100 MG capsule Take 1 pill at night for one week then increase to 2 pills at night. 50 capsule 0    losartan (COZAAR) 100 MG tablet Take 1 tablet by mouth daily 30 tablet 3    metoprolol succinate (TOPROL XL) 25 MG extended release tablet Take 1 tablet by mouth daily 90 tablet 0    rosuvastatin (CRESTOR) 40 MG tablet Take 1 tablet by mouth daily 30 tablet 3    tiotropium (SPIRIVA RESPIMAT) 2.5 MCG/ACT AERS inhaler Inhale 2 puffs into the lungs daily 1 each 2    varenicline (CHANTIX CONTINUING MONTH MARICEL) 1 MG tablet Take 1 tablet by mouth 2 times daily 60 tablet 3    DULoxetine (CYMBALTA) 60 MG extended release capsule Take 1 capsule by mouth daily 30 capsule 2    chlorthalidone (HYGROTON) 25 MG tablet Take 1 tablet by mouth daily 90 tablet 0    aspirin 81 MG chewable tablet Take 1 tablet by mouth daily 30 tablet 11    mirtazapine (REMERON) 7.5 MG tablet Take 1 tablet by mouth nightly 90 tablet 1    oxyCODONE-acetaminophen (PERCOCET)  MG per tablet Take 1 tablet by mouth every 6 hours as needed for Pain.

## 2024-10-04 NOTE — PATIENT INSTRUCTIONS
[place] Housing Resources*  (Call [employer]/211 if need more resources.)         [facility]  What they offer: Housing for elderly, disabled, handicapped, moderate and low-income families; rental assistance under Section 8 program (Housing Choice Voucher Program). Call for application information.  [facility] Phone number: [phone]  Website: [employer]  [facility] Phone Number: [phone]  Website: [employer]  [facility] Phone Number: [phone]  Website: Poll Everywhere                 [employer] [facility]:  What they offer: shelter for women and children as survivors of domestic violence in [facility]  Phone number: [phone]               [employer] SAFE:  What they offer: shelter for women and children as survivors of domestic violence in [facility]  Phone Number: [phone]  Website: [employer]    [facility] DOMESTIC VIOLENCE SERVICES:  What they offer: shelter for women and children as survivors of domestic violence in [facility]  Phone Number: [phone]  Website: [employer]          HOMELESS PROGRAM [facility]  What they offer: Assists homeless persons or families that lack a fixed or regular nighttime residence; shelter houses homeless from 3-30 days. Extended stay optional depending upon housing situation  PHONE Number: [phone], [phone]  Website: caaofcc.org    [facility]: [address] [place]  [address]  What they offer: Temporary shelter for homeless persons or families  Phone: [employer]  [phone]       Men   [phone]  Website: rescu[facility]mv.org  [facility]:  What they offer: Temporary shelter with hot meals for homeless persons 18 and older and for families.  Phone: [phone]  Website: [employer]  [employer]  What they offer: Emergency shelter for LGBTQ+ community and other resources  Contact: [phone]; Adam Jo

## 2024-10-08 PROBLEM — Z71.6 TOBACCO ABUSE COUNSELING: Status: ACTIVE | Noted: 2024-10-08

## 2024-10-08 PROBLEM — J44.9 COPD, MILD (HCC): Status: ACTIVE | Noted: 2024-10-08

## 2024-10-08 PROBLEM — F17.200 NICOTINE DEPENDENCE WITH CURRENT USE: Status: ACTIVE | Noted: 2024-10-08

## 2024-10-11 ENCOUNTER — HOSPITAL ENCOUNTER (EMERGENCY)
Age: 64
Discharge: HOME | End: 2024-10-12
Payer: MEDICARE

## 2024-10-11 DIAGNOSIS — N30.01 ACUTE CYSTITIS WITH HEMATURIA: Primary | ICD-10-CM

## 2024-10-11 DIAGNOSIS — I71.43 INFRARENAL ABDOMINAL AORTIC ANEURYSM (AAA) WITHOUT RUPTURE (HCC): ICD-10-CM

## 2024-10-11 DIAGNOSIS — E27.9 ADRENAL NODULE (HCC): ICD-10-CM

## 2024-10-11 PROCEDURE — 99285 EMERGENCY DEPT VISIT HI MDM: CPT

## 2024-10-12 ENCOUNTER — APPOINTMENT (OUTPATIENT)
Dept: CT IMAGING | Age: 64
End: 2024-10-12
Payer: MEDICARE

## 2024-10-12 VITALS
OXYGEN SATURATION: 100 % | HEART RATE: 79 BPM | SYSTOLIC BLOOD PRESSURE: 146 MMHG | WEIGHT: 160 LBS | DIASTOLIC BLOOD PRESSURE: 97 MMHG | RESPIRATION RATE: 18 BRPM | HEIGHT: 57 IN | BODY MASS INDEX: 34.52 KG/M2 | TEMPERATURE: 97.8 F

## 2024-10-12 LAB
ALBUMIN SERPL-MCNC: 4 G/DL (ref 3.5–5.2)
ALP SERPL-CCNC: 102 U/L (ref 35–104)
ALT SERPL-CCNC: 23 U/L (ref 0–32)
ANION GAP SERPL CALCULATED.3IONS-SCNC: 12 MMOL/L (ref 7–16)
AST SERPL-CCNC: 21 U/L (ref 0–31)
BACTERIA URNS QL MICRO: ABNORMAL
BASOPHILS # BLD: 0.09 K/UL (ref 0–0.2)
BASOPHILS NFR BLD: 1 % (ref 0–2)
BILIRUB SERPL-MCNC: 0.2 MG/DL (ref 0–1.2)
BILIRUB UR QL STRIP: ABNORMAL
BUN SERPL-MCNC: 17 MG/DL (ref 6–23)
CALCIUM SERPL-MCNC: 9.2 MG/DL (ref 8.6–10.2)
CHLORIDE SERPL-SCNC: 100 MMOL/L (ref 98–107)
CLARITY UR: ABNORMAL
CO2 SERPL-SCNC: 27 MMOL/L (ref 22–29)
COLOR UR: ABNORMAL
CREAT SERPL-MCNC: 0.8 MG/DL (ref 0.5–1)
EOSINOPHIL # BLD: 0.24 K/UL (ref 0.05–0.5)
EOSINOPHILS RELATIVE PERCENT: 2 % (ref 0–6)
ERYTHROCYTE [DISTWIDTH] IN BLOOD BY AUTOMATED COUNT: 13.8 % (ref 11.5–15)
GFR, ESTIMATED: 81 ML/MIN/1.73M2
GLUCOSE SERPL-MCNC: 115 MG/DL (ref 74–99)
GLUCOSE UR STRIP-MCNC: 100 MG/DL
HCT VFR BLD AUTO: 44.7 % (ref 34–48)
HGB BLD-MCNC: 14.6 G/DL (ref 11.5–15.5)
HGB UR QL STRIP.AUTO: ABNORMAL
IMM GRANULOCYTES # BLD AUTO: 0.06 K/UL (ref 0–0.58)
IMM GRANULOCYTES NFR BLD: 0 % (ref 0–5)
INR PPP: 1
KETONES UR STRIP-MCNC: 15 MG/DL
LACTATE BLDV-SCNC: 1 MMOL/L (ref 0.5–2.2)
LEUKOCYTE ESTERASE UR QL STRIP: ABNORMAL
LIPASE SERPL-CCNC: 18 U/L (ref 13–60)
LYMPHOCYTES NFR BLD: 3.44 K/UL (ref 1.5–4)
LYMPHOCYTES RELATIVE PERCENT: 22 % (ref 20–42)
MCH RBC QN AUTO: 29.9 PG (ref 26–35)
MCHC RBC AUTO-ENTMCNC: 32.7 G/DL (ref 32–34.5)
MCV RBC AUTO: 91.4 FL (ref 80–99.9)
MONOCYTES NFR BLD: 1.11 K/UL (ref 0.1–0.95)
MONOCYTES NFR BLD: 7 % (ref 2–12)
NEUTROPHILS NFR BLD: 69 % (ref 43–80)
NEUTS SEG NFR BLD: 10.93 K/UL (ref 1.8–7.3)
NITRITE UR QL STRIP: POSITIVE
PH UR STRIP: 6.5 [PH] (ref 5–9)
PLATELET # BLD AUTO: 398 K/UL (ref 130–450)
PMV BLD AUTO: 10.1 FL (ref 7–12)
POTASSIUM SERPL-SCNC: 3.7 MMOL/L (ref 3.5–5)
PROT SERPL-MCNC: 7.4 G/DL (ref 6.4–8.3)
PROT UR STRIP-MCNC: >=300 MG/DL
PROTHROMBIN TIME: 10.3 SEC (ref 9.3–12.4)
RBC # BLD AUTO: 4.89 M/UL (ref 3.5–5.5)
RBC #/AREA URNS HPF: ABNORMAL /HPF
SODIUM SERPL-SCNC: 139 MMOL/L (ref 132–146)
SP GR UR STRIP: 1.02 (ref 1–1.03)
UROBILINOGEN UR STRIP-ACNC: 2 EU/DL (ref 0–1)
WBC #/AREA URNS HPF: ABNORMAL /HPF
WBC OTHER # BLD: 15.9 K/UL (ref 4.5–11.5)

## 2024-10-12 PROCEDURE — 2580000003 HC RX 258: Performed by: NURSE PRACTITIONER

## 2024-10-12 PROCEDURE — 6360000002 HC RX W HCPCS: Performed by: NURSE PRACTITIONER

## 2024-10-12 PROCEDURE — 85025 COMPLETE CBC W/AUTO DIFF WBC: CPT

## 2024-10-12 PROCEDURE — 74177 CT ABD & PELVIS W/CONTRAST: CPT

## 2024-10-12 PROCEDURE — 96374 THER/PROPH/DIAG INJ IV PUSH: CPT

## 2024-10-12 PROCEDURE — 96372 THER/PROPH/DIAG INJ SC/IM: CPT

## 2024-10-12 PROCEDURE — 6360000004 HC RX CONTRAST MEDICATION: Performed by: RADIOLOGY

## 2024-10-12 PROCEDURE — 80053 COMPREHEN METABOLIC PANEL: CPT

## 2024-10-12 PROCEDURE — 85610 PROTHROMBIN TIME: CPT

## 2024-10-12 PROCEDURE — 87086 URINE CULTURE/COLONY COUNT: CPT

## 2024-10-12 PROCEDURE — 81001 URINALYSIS AUTO W/SCOPE: CPT

## 2024-10-12 PROCEDURE — 83690 ASSAY OF LIPASE: CPT

## 2024-10-12 PROCEDURE — 83605 ASSAY OF LACTIC ACID: CPT

## 2024-10-12 RX ORDER — MORPHINE SULFATE 4 MG/ML
4 INJECTION, SOLUTION INTRAMUSCULAR; INTRAVENOUS ONCE
Status: COMPLETED | OUTPATIENT
Start: 2024-10-12 | End: 2024-10-12

## 2024-10-12 RX ORDER — IOPAMIDOL 755 MG/ML
75 INJECTION, SOLUTION INTRAVASCULAR
Status: COMPLETED | OUTPATIENT
Start: 2024-10-12 | End: 2024-10-12

## 2024-10-12 RX ORDER — CEFDINIR 300 MG/1
300 CAPSULE ORAL 2 TIMES DAILY
Qty: 20 CAPSULE | Refills: 0 | Status: SHIPPED | OUTPATIENT
Start: 2024-10-12 | End: 2024-10-22

## 2024-10-12 RX ADMIN — IOPAMIDOL 75 ML: 755 INJECTION, SOLUTION INTRAVENOUS at 02:10

## 2024-10-12 RX ADMIN — MORPHINE SULFATE 4 MG: 4 INJECTION, SOLUTION INTRAMUSCULAR; INTRAVENOUS at 02:39

## 2024-10-12 RX ADMIN — CEFTRIAXONE SODIUM 1000 MG: 1 INJECTION, POWDER, FOR SOLUTION INTRAMUSCULAR; INTRAVENOUS at 02:38

## 2024-10-12 ASSESSMENT — PAIN DESCRIPTION - LOCATION: LOCATION: BACK;ABDOMEN

## 2024-10-12 ASSESSMENT — PAIN SCALES - GENERAL: PAINLEVEL_OUTOF10: 8

## 2024-10-12 ASSESSMENT — PAIN DESCRIPTION - DESCRIPTORS: DESCRIPTORS: DULL;ACHING

## 2024-10-12 NOTE — DISCHARGE INSTRUCTIONS
Take the antibiotic as prescribed if you do not have any significant improvement in the hematuria then you will need to follow-up with urology for further evaluation.  Also follow-up with your doctor regarding the finding of the aortic aneurysm of the abdominal area.

## 2024-10-12 NOTE — ED PROVIDER NOTES
[facility]dependent  HPI:  10/11/24, Time: 11:57 PM EDT         Tiffany Ivan is a 64 y.o. female presenting to the ED for concern regarding bloody urine as well as possible vaginal bleeding.  Patient presents to emergency department states that since 3 PM she has been having bleeding.  She believes it is coming from the vaginal area she does not believe that it is rectal at all.  She also reports that it is common from urine.  She does report having history of a hysterectomy.  She does report some mild abdominal cramping.  Patient denies any unusual nausea, vomiting or diarrhea.  She is not on any anticoagulant therapy.  No fevers that she is aware of.  Otherwise no chest pain no shortness of breath no unusual weakness or dizziness.    Review of Systems:   A complete review of systems was performed and pertinent positives and negatives are stated within HPI, all other systems reviewed and are negative.          --------------------------------------------- PAST HISTORY ---------------------------------------------  Past Medical History:  has a past medical history of Anxiety and depression, Baker's cyst, ruptured, CAD (coronary artery disease), Chronic back pain, Chronic myocarditis (HCC), Diverticulosis of sigmoid colon, Duodenal ulcer, Facet arthropathy, Fibrocystic breast changes, Fracture of metatarsal bone, Genital warts, GERD (gastroesophageal reflux disease), Heart attack (HCC), History of blood transfusion, History of cervical dysplasia, Hyperlipidemia, Hypertension, IGT (impaired glucose tolerance), Myocarditis (HCC), Obesity, OLIVIA (obstructive sleep apnea), Osteoarthritis of multiple joints, Osteomyelitis, Primary insomnia, Protruded cervical disc/DDD with neural foraminal stenosis, Pulmonary nodule, Sacroiliitis (HCC), Shingles, Tobacco abuse, and Valvular heart disease.    Past Surgical History:  has a past surgical history that includes shoulder surgery (Right); knee surgery (Bilateral); Foot surgery;  plan.      --------------------------------- IMPRESSION AND DISPOSITION ---------------------------------    IMPRESSION  1. Acute cystitis with hematuria    2. Infrarenal abdominal aortic aneurysm (AAA) without rupture (HCC)    3. Adrenal nodule (HCC)        DISPOSITION  Disposition: Discharge to home  Patient condition is stable      NOTE: This report was transcribed using voice recognition software. Every effort was made to ensure accuracy; however, inadvertent computerized transcription errors may be present

## 2024-10-13 LAB
MICROORGANISM SPEC CULT: ABNORMAL
MICROORGANISM SPEC CULT: ABNORMAL
SERVICE CMNT-IMP: ABNORMAL
SPECIMEN DESCRIPTION: ABNORMAL

## 2024-10-14 ENCOUNTER — TELEPHONE (OUTPATIENT)
Dept: FAMILY MEDICINE CLINIC | Age: 64
End: 2024-10-14

## 2024-10-14 NOTE — TELEPHONE ENCOUNTER
Went to the ED this weekend. Found out that she has an aneurism. She would like you to give her a call.

## 2024-10-15 NOTE — TELEPHONE ENCOUNTER
Called patient at home.  Pt is worried about her suprarenal aneurysm which is new and about the adrenal nodule which she is worried is increasing her blood pressure.  She was seen recently and is being treated for a urinary tract infection which does not seem to be getting better.   Would like to talk with PCP regarding these issues.  Ed pt to not go to the ER as they will not be able to help her with her aneurysm.  Ed pt that PCP will be able to refer her to the correct physician to help and that she needs to keep her BP under control, take her meds and keep hydrated.

## 2024-10-15 NOTE — TELEPHONE ENCOUNTER
Patient  called again this morning. States that if she doesn't receive a return call she will go back to the ED or [facility].

## 2024-10-18 ENCOUNTER — OFFICE VISIT (OUTPATIENT)
Dept: FAMILY MEDICINE CLINIC | Age: 64
End: 2024-10-18
Payer: MEDICARE

## 2024-10-18 VITALS
OXYGEN SATURATION: 94 % | RESPIRATION RATE: 16 BRPM | BODY MASS INDEX: 36.89 KG/M2 | SYSTOLIC BLOOD PRESSURE: 133 MMHG | TEMPERATURE: 97.2 F | HEART RATE: 59 BPM | WEIGHT: 171 LBS | HEIGHT: 57 IN | DIASTOLIC BLOOD PRESSURE: 89 MMHG

## 2024-10-18 DIAGNOSIS — N36.2 URETHRAL CARUNCLE: ICD-10-CM

## 2024-10-18 DIAGNOSIS — R31.0 GROSS HEMATURIA: Primary | ICD-10-CM

## 2024-10-18 DIAGNOSIS — R10.9 FLANK PAIN: ICD-10-CM

## 2024-10-18 DIAGNOSIS — Z72.0 TOBACCO USE: ICD-10-CM

## 2024-10-18 LAB
BACTERIA: ABNORMAL
BILIRUBIN, URINE: NEGATIVE
COLOR, UA: YELLOW
EPITHELIAL CELLS, UA: ABNORMAL /HPF
GLUCOSE URINE: NEGATIVE MG/DL
KETONES, URINE: NEGATIVE MG/DL
LEUKOCYTE ESTERASE, URINE: NEGATIVE
NITRITE, URINE: NEGATIVE
PH, URINE: 5.5 (ref 5–9)
PROTEIN UA: NEGATIVE MG/DL
RBC UA: ABNORMAL /HPF
SPECIFIC GRAVITY UA: 1.02 (ref 1–1.03)
TURBIDITY: CLEAR
URINE HGB: NEGATIVE
UROBILINOGEN, URINE: 0.2 EU/DL (ref 0–1)
WBC UA: ABNORMAL /HPF

## 2024-10-18 PROCEDURE — 4004F PT TOBACCO SCREEN RCVD TLK: CPT | Performed by: FAMILY MEDICINE

## 2024-10-18 PROCEDURE — 3017F COLORECTAL CA SCREEN DOC REV: CPT | Performed by: FAMILY MEDICINE

## 2024-10-18 PROCEDURE — 3079F DIAST BP 80-89 MM HG: CPT | Performed by: FAMILY MEDICINE

## 2024-10-18 PROCEDURE — 3075F SYST BP GE 130 - 139MM HG: CPT | Performed by: FAMILY MEDICINE

## 2024-10-18 PROCEDURE — G8417 CALC BMI ABV UP PARAM F/U: HCPCS | Performed by: FAMILY MEDICINE

## 2024-10-18 PROCEDURE — G8484 FLU IMMUNIZE NO ADMIN: HCPCS | Performed by: FAMILY MEDICINE

## 2024-10-18 PROCEDURE — 99214 OFFICE O/P EST MOD 30 MIN: CPT | Performed by: FAMILY MEDICINE

## 2024-10-18 PROCEDURE — G8427 DOCREV CUR MEDS BY ELIG CLIN: HCPCS | Performed by: FAMILY MEDICINE

## 2024-10-18 RX ORDER — FLUCONAZOLE 150 MG/1
150 TABLET ORAL ONCE
Qty: 1 TABLET | Refills: 0 | Status: SHIPPED | OUTPATIENT
Start: 2024-10-18 | End: 2024-10-18

## 2024-10-18 RX ORDER — CONJUGATED ESTROGENS 0.62 MG/G
CREAM VAGINAL
Qty: 30 G | Refills: 2 | Status: SHIPPED | OUTPATIENT
Start: 2024-10-18

## 2024-10-18 NOTE — PROGRESS NOTES
of estrogen cream to the caruncle once daily for two weeks and then twice per week for two to three months        F/u as scheduled    Advised patient to call with any new medication issues. All questions answered.  Call or go to emergency department ifsymptoms worsen or persist.

## 2024-10-24 LAB — NON-GYN CYTOLOGY REPORT: NORMAL

## 2024-11-04 NOTE — TELEPHONE ENCOUNTER
Name of Medication(s) Requested:  Requested Prescriptions     Pending Prescriptions Disp Refills    hydrOXYzine pamoate (VISTARIL) 25 MG capsule [Pharmacy Med Name: HYDROXYZINE PAMOATE 25MG CAPSULES] 90 capsule 0     Sig: TAKE 1 CAPSULE BY MOUTH THREE TIMES DAILY AS NEEDED FOR ANXIETY       Medication is on current medication list Yes    Dosage and directions were verified? Yes    Quantity verified: 30 day supply     Pharmacy Verified?  Yes    Last Appointment:  10/18/2024    Future appts:  Future Appointments   Date Time Provider Department Center   11/7/2024  1:00 PM Sy Marks DO [facility]   11/12/2024 12:30 PM Clifton Berg DO [facility] [place] DEP   11/29/2024 10:45 AM Sally Gardner MD [facility]   2/6/2025 11:45 AM Juve Mccullough MD [facility]        (If no appt send self scheduling link. .REFILLAPPT)  Scheduling request sent?     [] Yes  [x] No    Does patient need updated?  [] Yes  [x] No

## 2024-11-05 RX ORDER — HYDROXYZINE PAMOATE 25 MG/1
CAPSULE ORAL
Qty: 90 CAPSULE | Refills: 0 | Status: SHIPPED | OUTPATIENT
Start: 2024-11-05

## 2024-11-07 ENCOUNTER — OFFICE VISIT (OUTPATIENT)
Dept: RHEUMATOLOGY | Age: 64
End: 2024-11-07
Payer: MEDICARE

## 2024-11-07 VITALS
DIASTOLIC BLOOD PRESSURE: 94 MMHG | OXYGEN SATURATION: 97 % | SYSTOLIC BLOOD PRESSURE: 139 MMHG | HEART RATE: 94 BPM | WEIGHT: 160 LBS | BODY MASS INDEX: 34.52 KG/M2 | HEIGHT: 57 IN

## 2024-11-07 DIAGNOSIS — M13.0 POLYARTHRITIS: ICD-10-CM

## 2024-11-07 DIAGNOSIS — M54.42 CHRONIC BILATERAL LOW BACK PAIN WITH BILATERAL SCIATICA: ICD-10-CM

## 2024-11-07 DIAGNOSIS — M45.8 ANKYLOSING SPONDYLITIS OF SACRAL REGION (HCC): Primary | ICD-10-CM

## 2024-11-07 DIAGNOSIS — M46.1 SACROILIITIS (HCC): ICD-10-CM

## 2024-11-07 DIAGNOSIS — G89.29 CHRONIC BILATERAL LOW BACK PAIN WITH BILATERAL SCIATICA: ICD-10-CM

## 2024-11-07 DIAGNOSIS — M54.41 CHRONIC BILATERAL LOW BACK PAIN WITH BILATERAL SCIATICA: ICD-10-CM

## 2024-11-07 PROCEDURE — G8484 FLU IMMUNIZE NO ADMIN: HCPCS | Performed by: INTERNAL MEDICINE

## 2024-11-07 PROCEDURE — 3080F DIAST BP >= 90 MM HG: CPT | Performed by: INTERNAL MEDICINE

## 2024-11-07 PROCEDURE — G2211 COMPLEX E/M VISIT ADD ON: HCPCS | Performed by: INTERNAL MEDICINE

## 2024-11-07 PROCEDURE — 4004F PT TOBACCO SCREEN RCVD TLK: CPT | Performed by: INTERNAL MEDICINE

## 2024-11-07 PROCEDURE — G8427 DOCREV CUR MEDS BY ELIG CLIN: HCPCS | Performed by: INTERNAL MEDICINE

## 2024-11-07 PROCEDURE — 3017F COLORECTAL CA SCREEN DOC REV: CPT | Performed by: INTERNAL MEDICINE

## 2024-11-07 PROCEDURE — 3075F SYST BP GE 130 - 139MM HG: CPT | Performed by: INTERNAL MEDICINE

## 2024-11-07 PROCEDURE — G8417 CALC BMI ABV UP PARAM F/U: HCPCS | Performed by: INTERNAL MEDICINE

## 2024-11-07 PROCEDURE — 99205 OFFICE O/P NEW HI 60 MIN: CPT | Performed by: INTERNAL MEDICINE

## 2024-11-07 ASSESSMENT — ENCOUNTER SYMPTOMS
TROUBLE SWALLOWING: 0
SHORTNESS OF BREATH: 0
VOMITING: 0
BACK PAIN: 1
DIARRHEA: 0
ABDOMINAL PAIN: 0
COLOR CHANGE: 0
COUGH: 0
NAUSEA: 0

## 2024-11-07 NOTE — PROGRESS NOTES
with pain in her knees and hands. She has been diagnosed with carpal tunnel syndrome. She has dry eyes and has been suffering from severe headaches for the past year.     She has undergone a colonoscopy and reports no history of Crohn's disease or ulcerative colitis. She has dry skin but reports no rashes.     She had right knee surgery and a hammertoe bunion that turned into a bone infection, resulting in the loss of her second toe.    FAMILY HISTORY  Her mother had severe rheumatoid arthritis and was completely crippled up. She  at the age of 57. Her grandmother had some digestive issues. Her mother, grandmother, and great grandmother all had rheumatoid arthritis.       Past Medical History:   Diagnosis Date    Anxiety and depression 2013    Baker's cyst, ruptured     CAD (coronary artery disease)     Chronic back pain     ankylosing spondylitis , RA    Chronic myocarditis (HCC) 2016    Diverticulosis of sigmoid colon     Duodenal ulcer     Facet arthropathy     Fibrocystic breast changes     Fracture of metatarsal bone     RIGHT FOOT    Genital warts     GERD (gastroesophageal reflux disease)     Heart attack (HCC) 09/15/2018    History of blood transfusion     loss of blood during pregnancy    History of cervical dysplasia 2013    Hyperlipidemia     Hypertension     IGT (impaired glucose tolerance) 2015    Myocarditis ([facility])     Dr. Dawson    Obesity     OLIVIA (obstructive sleep apnea)     CPAP    Osteoarthritis of multiple joints 2013    Osteomyelitis     Primary insomnia 2015    Protruded cervical disc/DDD with neural foraminal stenosis 6/10/2015    Pulmonary nodule 2016    Sacroiliitis (HCC) 2014    Shingles     Tobacco abuse     Valvular heart disease 2016    Per cardiac MRI 16-Mild TR/Mild AI Follows with [facility] Dr Dawson        Review of Systems   Constitutional:  Negative for fatigue and fever.   HENT:  Negative for mouth sores and trouble

## 2024-11-07 NOTE — PATIENT INSTRUCTIONS
You certainly have degenerative changes in the spine but also some features that raise some suspicion for ankylosing spondylitis so will need further workup    Pending workup may consider Cosentyx    Dr. Marks has ordered a radiology test for you such as \"Bone Density Scan, MRI, CT, etc\". The [facility] Radiology Scheduling Department should contact you within 1-2 weeks to schedule your appointment. If you do not hear from the scheduling department, please contact them at [phone]. If any questions, please call Dr. Marks's office if needed at [phone]. Thank you.

## 2024-11-08 ENCOUNTER — HOSPITAL ENCOUNTER (OUTPATIENT)
Age: 64
Discharge: HOME | End: 2024-11-08
Payer: MEDICARE

## 2024-11-08 DIAGNOSIS — M13.0 POLYARTHRITIS: ICD-10-CM

## 2024-11-08 DIAGNOSIS — M46.1 SACROILIITIS (HCC): ICD-10-CM

## 2024-11-08 DIAGNOSIS — M45.8 ANKYLOSING SPONDYLITIS OF SACRAL REGION (HCC): ICD-10-CM

## 2024-11-08 LAB
CRP SERPL HS-MCNC: 7 MG/L (ref 0–5)
ERYTHROCYTE [SEDIMENTATION RATE] IN BLOOD BY WESTERGREN METHOD: 9 MM/HR (ref 0–20)
RHEUMATOID FACT SER NEPH-ACNC: <10 IU/ML (ref 0–13)
SEND OUT REPORT: NORMAL
TEST NAME: NORMAL

## 2024-11-08 PROCEDURE — 36415 COLL VENOUS BLD VENIPUNCTURE: CPT

## 2024-11-08 PROCEDURE — 86140 C-REACTIVE PROTEIN: CPT

## 2024-11-08 PROCEDURE — 86200 CCP ANTIBODY: CPT

## 2024-11-08 PROCEDURE — 86812 HLA TYPING A B OR C: CPT

## 2024-11-08 PROCEDURE — 86431 RHEUMATOID FACTOR QUANT: CPT

## 2024-11-08 PROCEDURE — 85652 RBC SED RATE AUTOMATED: CPT

## 2024-11-10 LAB — HLA B27: NORMAL

## 2024-11-11 DIAGNOSIS — M13.0 POLYARTHRITIS: Primary | ICD-10-CM

## 2024-11-11 DIAGNOSIS — M45.6 ANKYLOSING SPONDYLITIS LUMBAR REGION (HCC): ICD-10-CM

## 2024-11-11 LAB — CCP AB SER IA-ACNC: 1.4 U/ML (ref 0–7)

## 2024-11-11 NOTE — PROGRESS NOTES
[facility] Care      Department of Family Medicine  Phone: [phone]   Fax: [phone]    11/11/24    Tiffany Ivan is a 64 y.o. female with PMHx of HTN, ankylosing spondylitis?, diverticulosis, GERD, pulmonary nodules, CAD , COPD, chronic back pain with sciatica, HLD presenting to the outpatient clinic for:  Chief Complaint   Patient presents with    New Patient     No concerns        New patient  (Prev [place] patient, Han Ivan ex wife)    HPI:    Social:  Lives with: self; moving from an apt to a mobile home this month   2 years ago, was together since she was 18 yo; is still BFF with his sister   Works at: disabled 2/2 to chronic back pain   Children/grandchildren: 2 children (1 son- 41 yo in [place] has been there 15 years and will be there for life, 1 daughter 36 yo), 11 grandchildren in total  Pets: wants a dog   Hobbies: not many currently, takes care of her ex a lot; now working to find herself again   Alcohol: very rarely   Drugs: denies  Tobacco: current, right now \"a lot lately\" 2/2 to stressors, 0.5 ppd currently; smoking since 16 yo   Vape: denies, tried before but stopped     Medical History:  CAD: Follows with cardiology (Jamel); has had caths in 2018 and 2020 but no stents placed; minimal CAD with atypical chest pain leading to cardiac catheteriztions in the past and recent normal lexiscan in May 2024- medical management only, Normal LV function by Echo and CV exam   Ankylosing spondylitis?  Versus degenerative back disease:  Following with rheumatology(Kendrick).  Has upcoming MRI and lab work up, started on Cosentyx; needs DNA testing for further work up  Chronic Back Pain: has been recommended to have surgery previously   Tobacco use: is planning on doing Chantix   Mild AAA: 2.6 cm visualized on CT abdomen pelvis from ER in October  Adrenal Nodule: since 2019, 2.2 cm seen on CT; referred to Endo (Radha) by urology   Lung Nodule: did see pulm

## 2024-11-12 ENCOUNTER — OFFICE VISIT (OUTPATIENT)
Dept: FAMILY MEDICINE CLINIC | Age: 64
End: 2024-11-12
Payer: MEDICARE

## 2024-11-12 VITALS
OXYGEN SATURATION: 98 % | WEIGHT: 160 LBS | SYSTOLIC BLOOD PRESSURE: 122 MMHG | TEMPERATURE: 98.5 F | BODY MASS INDEX: 34.52 KG/M2 | HEART RATE: 76 BPM | DIASTOLIC BLOOD PRESSURE: 66 MMHG | HEIGHT: 57 IN

## 2024-11-12 DIAGNOSIS — F41.9 ANXIETY: Primary | ICD-10-CM

## 2024-11-12 DIAGNOSIS — I71.43 INFRARENAL ABDOMINAL AORTIC ANEURYSM (AAA) WITHOUT RUPTURE (HCC): ICD-10-CM

## 2024-11-12 PROCEDURE — G8427 DOCREV CUR MEDS BY ELIG CLIN: HCPCS | Performed by: STUDENT IN AN ORGANIZED HEALTH CARE EDUCATION/TRAINING PROGRAM

## 2024-11-12 PROCEDURE — 3078F DIAST BP <80 MM HG: CPT | Performed by: STUDENT IN AN ORGANIZED HEALTH CARE EDUCATION/TRAINING PROGRAM

## 2024-11-12 PROCEDURE — 3017F COLORECTAL CA SCREEN DOC REV: CPT | Performed by: STUDENT IN AN ORGANIZED HEALTH CARE EDUCATION/TRAINING PROGRAM

## 2024-11-12 PROCEDURE — G8484 FLU IMMUNIZE NO ADMIN: HCPCS | Performed by: STUDENT IN AN ORGANIZED HEALTH CARE EDUCATION/TRAINING PROGRAM

## 2024-11-12 PROCEDURE — 3074F SYST BP LT 130 MM HG: CPT | Performed by: STUDENT IN AN ORGANIZED HEALTH CARE EDUCATION/TRAINING PROGRAM

## 2024-11-12 PROCEDURE — G8417 CALC BMI ABV UP PARAM F/U: HCPCS | Performed by: STUDENT IN AN ORGANIZED HEALTH CARE EDUCATION/TRAINING PROGRAM

## 2024-11-12 PROCEDURE — 4004F PT TOBACCO SCREEN RCVD TLK: CPT | Performed by: STUDENT IN AN ORGANIZED HEALTH CARE EDUCATION/TRAINING PROGRAM

## 2024-11-12 PROCEDURE — 99214 OFFICE O/P EST MOD 30 MIN: CPT | Performed by: STUDENT IN AN ORGANIZED HEALTH CARE EDUCATION/TRAINING PROGRAM

## 2024-11-12 RX ORDER — HYDROXYZINE PAMOATE 25 MG/1
25 CAPSULE ORAL 3 TIMES DAILY PRN
Qty: 90 CAPSULE | Refills: 0 | Status: SHIPPED | OUTPATIENT
Start: 2024-11-12

## 2024-11-12 ASSESSMENT — ENCOUNTER SYMPTOMS
BACK PAIN: 1
COUGH: 0
NAUSEA: 0
ABDOMINAL PAIN: 0
SHORTNESS OF BREATH: 0

## 2024-11-12 NOTE — PATIENT INSTRUCTIONS
Blood in urine: CT urogram order by urology (Mehran), wants to look upper bladder tract    Adrenal Nodule: usually benign, because increase in size, sent to Endocrinology (Francisco) for further work up    Anklyosis Spondylitis?: Marker resulted indeterminate, Rhuem (Kendrick) ordered further DNA test for more workup and pelvic MRI

## 2024-11-13 ENCOUNTER — TELEPHONE (OUTPATIENT)
Dept: VASCULAR SURGERY | Age: 64
End: 2024-11-13

## 2024-11-14 LAB
SEND OUT REPORT: NORMAL
TEST NAME: NORMAL

## 2024-12-04 ENCOUNTER — OFFICE VISIT (OUTPATIENT)
Dept: VASCULAR SURGERY | Age: 64
End: 2024-12-04
Payer: MEDICARE

## 2024-12-04 DIAGNOSIS — I77.811 ABDOMINAL AORTIC ECTASIA (HCC): Primary | ICD-10-CM

## 2024-12-04 PROCEDURE — G8417 CALC BMI ABV UP PARAM F/U: HCPCS | Performed by: SURGERY

## 2024-12-04 PROCEDURE — 3017F COLORECTAL CA SCREEN DOC REV: CPT | Performed by: SURGERY

## 2024-12-04 PROCEDURE — G8427 DOCREV CUR MEDS BY ELIG CLIN: HCPCS | Performed by: SURGERY

## 2024-12-04 PROCEDURE — 4004F PT TOBACCO SCREEN RCVD TLK: CPT | Performed by: SURGERY

## 2024-12-04 PROCEDURE — 99203 OFFICE O/P NEW LOW 30 MIN: CPT | Performed by: SURGERY

## 2024-12-04 PROCEDURE — G8484 FLU IMMUNIZE NO ADMIN: HCPCS | Performed by: SURGERY

## 2024-12-04 NOTE — PROGRESS NOTES
Vascular Surgery Outpatient Consultation      Chief Complaint   Patient presents with    Follow-up     Hospital follow up with AAA.        Reason for Consult:  Abdominal ectasia    Requesting Physician:  Dr. Erinn Bennett    HISTORY OF PRESENT ILLNESS:                The patient is a 64 y.o. female who presents for evaluation of abdominal aortic aneurysm.  Reports she was in the emergency room with hematuria and had a CT scan performed.  On the CT scan it reported that her aorta measured 2.6 cm and was aneurysmal.  She denies any family history of aneurysm disease.  She does smoke.  She is undergoing workup for hematuria by urology as well as an adrenal nodule.    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 Dawson    Obesity     OLIVIA (obstructive sleep apnea)     CPAP    Osteoarthritis of multiple joints 08/13/2013    Osteomyelitis     Primary insomnia 11/11/2015    Protruded cervical disc/DDD with neural foraminal stenosis 06/10/2015    Pulmonary nodule 09/27/2016    Sacroiliitis ([facility]) 06/02/2014    Shingles     Tobacco abuse     Valvular heart disease 11/30/2016    Per cardiac MRI 11/8/16-Mild TR/Mild AI Follows with [facility] Dr Dawson     Past Surgical History:        Procedure Laterality Date    BUNIONECTOMY  great toe

## 2024-12-09 DIAGNOSIS — K21.9 GASTROESOPHAGEAL REFLUX DISEASE, UNSPECIFIED WHETHER ESOPHAGITIS PRESENT: ICD-10-CM

## 2024-12-10 RX ORDER — OMEPRAZOLE 40 MG/1
40 CAPSULE, DELAYED RELEASE ORAL DAILY
Qty: 90 CAPSULE | Refills: 0 | Status: SHIPPED | OUTPATIENT
Start: 2024-12-10

## 2024-12-10 NOTE — TELEPHONE ENCOUNTER
Name of Medication(s) Requested:  Requested Prescriptions     Pending Prescriptions Disp Refills    omeprazole (PRILOSEC) 40 MG delayed release capsule [Pharmacy Med Name: OMEPRAZOLE 40MG CAPSULES] 90 capsule 0     Sig: TAKE 1 CAPSULE BY MOUTH DAILY       Medication is on current medication list Yes    Dosage and directions were verified? Yes    Quantity verified: 90 day supply     Pharmacy Verified?  Yes    Last Appointment:  10/18/2024    Future appts:  Future Appointments   Date Time Provider Department Center   2/6/2025 11:45 AM Juve Mccullough MD [place] [facility]   11/4/2026  1:00 PM HAL YNEREIDA VAS US 1 SEYZ CARDIO SE Rad/Car   11/4/2026  1:30 PM Aretha Yin MD [facility]/MED [facility]        (If no appt send self scheduling link. .REFILLAPPT)  Scheduling request sent?     [] Yes  [] No    Does patient need updated?  [] Yes  [] No

## 2024-12-16 ENCOUNTER — HOSPITAL ENCOUNTER (OUTPATIENT)
Age: 64
Discharge: HOME | End: 2024-12-16
Payer: MEDICARE

## 2024-12-16 DIAGNOSIS — M45.6 ANKYLOSING SPONDYLITIS LUMBAR REGION (HCC): ICD-10-CM

## 2024-12-16 DIAGNOSIS — M46.1 SACROILIITIS (HCC): ICD-10-CM

## 2024-12-16 DIAGNOSIS — M13.0 POLYARTHRITIS: ICD-10-CM

## 2024-12-16 PROCEDURE — 36415 COLL VENOUS BLD VENIPUNCTURE: CPT

## 2024-12-16 PROCEDURE — 86812 HLA TYPING A B OR C: CPT

## 2024-12-19 LAB — HLA B27: NORMAL

## 2024-12-21 LAB
SEND OUT REPORT: NORMAL
TEST NAME: NORMAL

## 2025-01-02 LAB — HLA B27: NORMAL

## 2025-01-06 DIAGNOSIS — M45.0 ANKYLOSING SPONDYLITIS OF MULTIPLE SITES IN SPINE (HCC): Primary | ICD-10-CM

## 2025-01-08 ENCOUNTER — TELEPHONE (OUTPATIENT)
Dept: RHEUMATOLOGY | Age: 65
End: 2025-01-08

## 2025-01-08 NOTE — TELEPHONE ENCOUNTER
Patient had a MRI Abd done on 01/07/2025 at [facility].    Report is scanned in chart.    Please review and advise further instructions.      Electronically signed by Roseann Sanchez CMA on 1/8/2025 at 2:39 PM

## 2025-01-09 NOTE — TELEPHONE ENCOUNTER
Noted, waiting on that report.    Electronically signed by Roseann Sanchez CMA on 1/9/2025 at 1:20 PM

## 2025-01-30 ENCOUNTER — TELEPHONE (OUTPATIENT)
Dept: ENDOCRINOLOGY | Age: 65
End: 2025-01-30

## 2025-01-30 NOTE — TELEPHONE ENCOUNTER
Patient is asking to be fit on schedule to a sooner date and time. She is being referred for Adrenal nodule (HCC), by Dr. Laurent. I scheduled for first available and added to wait list. Please contact patient if this can be moved to sooner date and time.

## 2025-02-06 ENCOUNTER — OFFICE VISIT (OUTPATIENT)
Dept: CARDIOLOGY CLINIC | Age: 65
End: 2025-02-06
Payer: MEDICARE

## 2025-02-06 VITALS
HEIGHT: 57 IN | SYSTOLIC BLOOD PRESSURE: 131 MMHG | RESPIRATION RATE: 18 BRPM | OXYGEN SATURATION: 97 % | TEMPERATURE: 97.8 F | BODY MASS INDEX: 36.55 KG/M2 | WEIGHT: 169.4 LBS | HEART RATE: 72 BPM | DIASTOLIC BLOOD PRESSURE: 75 MMHG

## 2025-02-06 DIAGNOSIS — R00.2 PALPITATIONS: ICD-10-CM

## 2025-02-06 DIAGNOSIS — I10 ESSENTIAL HYPERTENSION: ICD-10-CM

## 2025-02-06 DIAGNOSIS — I25.10 CAD IN NATIVE ARTERY: Primary | Chronic | ICD-10-CM

## 2025-02-06 DIAGNOSIS — F41.9 ANXIETY: ICD-10-CM

## 2025-02-06 DIAGNOSIS — I25.10 CAD IN NATIVE ARTERY: Chronic | ICD-10-CM

## 2025-02-06 PROBLEM — Z71.6 TOBACCO ABUSE COUNSELING: Status: RESOLVED | Noted: 2024-10-08 | Resolved: 2025-02-06

## 2025-02-06 PROCEDURE — 4004F PT TOBACCO SCREEN RCVD TLK: CPT | Performed by: INTERNAL MEDICINE

## 2025-02-06 PROCEDURE — 3075F SYST BP GE 130 - 139MM HG: CPT | Performed by: INTERNAL MEDICINE

## 2025-02-06 PROCEDURE — 93000 ELECTROCARDIOGRAM COMPLETE: CPT | Performed by: INTERNAL MEDICINE

## 2025-02-06 PROCEDURE — G8427 DOCREV CUR MEDS BY ELIG CLIN: HCPCS | Performed by: INTERNAL MEDICINE

## 2025-02-06 PROCEDURE — 3078F DIAST BP <80 MM HG: CPT | Performed by: INTERNAL MEDICINE

## 2025-02-06 PROCEDURE — 99213 OFFICE O/P EST LOW 20 MIN: CPT | Performed by: INTERNAL MEDICINE

## 2025-02-06 PROCEDURE — G8417 CALC BMI ABV UP PARAM F/U: HCPCS | Performed by: INTERNAL MEDICINE

## 2025-02-06 PROCEDURE — 3017F COLORECTAL CA SCREEN DOC REV: CPT | Performed by: INTERNAL MEDICINE

## 2025-02-06 RX ORDER — CHLORTHALIDONE 25 MG/1
25 TABLET ORAL DAILY
Qty: 90 TABLET | Refills: 0 | Status: SHIPPED | OUTPATIENT
Start: 2025-02-06

## 2025-02-06 RX ORDER — ROSUVASTATIN CALCIUM 40 MG/1
40 TABLET, COATED ORAL DAILY
Qty: 30 TABLET | Refills: 3 | Status: SHIPPED | OUTPATIENT
Start: 2025-02-06

## 2025-02-06 RX ORDER — METOPROLOL SUCCINATE 25 MG/1
25 TABLET, EXTENDED RELEASE ORAL DAILY
Qty: 90 TABLET | Refills: 0 | Status: SHIPPED | OUTPATIENT
Start: 2025-02-06

## 2025-02-06 RX ORDER — CHLORTHALIDONE 25 MG/1
25 TABLET ORAL DAILY
Qty: 90 TABLET | Refills: 0 | OUTPATIENT
Start: 2025-02-06

## 2025-02-06 RX ORDER — METOPROLOL SUCCINATE 25 MG/1
25 TABLET, EXTENDED RELEASE ORAL DAILY
Qty: 90 TABLET | Refills: 0 | OUTPATIENT
Start: 2025-02-06

## 2025-02-06 RX ORDER — HYDROXYZINE PAMOATE 25 MG/1
CAPSULE ORAL
Qty: 90 CAPSULE | Refills: 0 | Status: SHIPPED | OUTPATIENT
Start: 2025-02-06

## 2025-02-06 NOTE — PROGRESS NOTES
Chief Complaint   Patient presents with    Coronary Artery Disease    Palpitations       Patient Active Problem List    Diagnosis Date Noted    Chronic bilateral low back pain with bilateral sciatica 11/07/2024    Nicotine dependence with current use 10/08/2024    COPD, mild (HCC) 10/08/2024    Episode of recurrent major depressive disorder (HCC) 02/07/2022    History of ankylosing spondylitis 12/29/2021    Palpitations 12/22/2021     Overview Note:     A. Monitor 1/2022: one ten beat SVT   B. MCOT 5/2024: possible Atach      Vertigo 03/21/2019    CAD in native artery      Overview Note:     A. \"ACS\" 2018 ([facility]):  \" severe sequential lesions small non dominant RCA\"  B.  Cath 9/25/20 ([facility]):  \"70% small OM\"  B. Normal lexiscan 5/2024      Mixed hyperlipidemia 09/22/2018    OLIVIA (obstructive sleep apnea) 04/05/2018    Pulmonary nodules 09/27/2016    Essential hypertension 11/11/2015    Diverticulosis of sigmoid colon     GERD (gastroesophageal reflux disease)        Current Outpatient Medications   Medication Sig Dispense Refill    omeprazole (PRILOSEC) 40 MG delayed release capsule Take 1 capsule by mouth Daily 90 capsule 0    hydrOXYzine pamoate (VISTARIL) 25 MG capsule Take 1 capsule by mouth 3 times daily as needed for Itching (Patient taking differently: Take 1 capsule by mouth nightly) 90 capsule 0    estrogens conjugated (PREMARIN) 0.625 MG/GM CREA vaginal cream Apply a fingertip amount (ie, approximately 0.3 mg) of estrogen cream to the caruncle once daily for two weeks and then twice per week for two to three months 30 g 2    diclofenac sodium (VOLTAREN) 1 % GEL Apply topically 4 times daily as needed      losartan (COZAAR) 100 MG tablet Take 1 tablet by mouth daily 30 tablet 3    metoprolol succinate (TOPROL XL) 25 MG extended release tablet Take 1 tablet by mouth daily 90 tablet 0    rosuvastatin (CRESTOR) 40 MG tablet Take 1 tablet by mouth daily 30 tablet 3    tiotropium (SPIRIVA RESPIMAT) 2.5 MCG/ACT AERS

## 2025-02-06 NOTE — PATIENT INSTRUCTIONS
Check your metoprolol dose.  I have u down as 25mg.  If this is so, try taking two pills a day - either together, or 12 hours apart to see if palps get less    If you have 50 mg tabs, OK to take an extra 1/2 tab 12 hours later

## 2025-02-06 NOTE — TELEPHONE ENCOUNTER
Name of Medication(s) Requested:  Requested Prescriptions     Pending Prescriptions Disp Refills    metoprolol succinate (TOPROL XL) 25 MG extended release tablet 90 tablet 0     Sig: Take 1 tablet by mouth daily    rosuvastatin (CRESTOR) 40 MG tablet 30 tablet 3     Sig: Take 1 tablet by mouth daily    chlorthalidone (HYGROTON) 25 MG tablet 90 tablet 0     Sig: Take 1 tablet by mouth daily       Medication is on current medication list Yes    Dosage and directions were verified? Yes    Quantity verified: 90 day supply     Pharmacy Verified?  Yes    Last Appointment:  11/12/2024    Future appts:  Future Appointments   Date Time Provider Department Center   8/11/2025  1:00 PM Sy Douglas APRN - CNS BDM ENDO [facility]   2/9/2026 11:45 AM Juve Mccullough MD [place] [facility]   11/4/2026  1:00 PM HAL JIMENEZ VAS US 1 SEYZ CARDIO [facility] Rad/Car   11/4/2026  1:30 PM Aretha Yin MD VASC/MED [facility]        (If no appt send self scheduling link. .REFILLAPPT)  Scheduling request sent?     [] Yes  [] No    Does patient need updated?  [] Yes  [] No

## 2025-02-06 NOTE — TELEPHONE ENCOUNTER
Name of Medication(s) Requested:  Requested Prescriptions     Pending Prescriptions Disp Refills    hydrOXYzine pamoate (VISTARIL) 25 MG capsule [Pharmacy Med Name: HYDROXYZINE PAMOATE 25MG CAPSULES] 90 capsule 0     Sig: TAKE 1 CAPSULE BY MOUTH THREE TIMES DAILY AS NEEDED FOR ITCHING       Medication is on current medication list Yes    Dosage and directions were verified? Yes    Quantity verified: 90 day supply     Pharmacy Verified?  Yes    Last Appointment:  11/12/2024    Future appts:  Future Appointments   Date Time Provider Department Center   2/20/2025  7:50 AM Clifton Berg DO [facility] [place]   8/11/2025  1:00 PM Sy Douglas APRN - CNS BDM ENDO [facility]   2/9/2026 11:45 AM Juve Mccullough MD [place] [facility]   11/4/2026  1:00 PM HAL JIMENEZ VAS US 1 SEYZ CARDIO [facility] Rad/Car   11/4/2026  1:30 PM Aretha Yin MD VASC/MED [facility]        (If no appt send self scheduling link. .REFILLAPPT)  Scheduling request sent?     [] Yes  [] No    Does patient need updated?  [] Yes  [] No

## 2025-02-20 ENCOUNTER — OFFICE VISIT (OUTPATIENT)
Dept: FAMILY MEDICINE CLINIC | Age: 65
End: 2025-02-20
Payer: MEDICARE

## 2025-02-20 VITALS
OXYGEN SATURATION: 92 % | HEART RATE: 79 BPM | TEMPERATURE: 98.8 F | HEIGHT: 57 IN | BODY MASS INDEX: 36.29 KG/M2 | DIASTOLIC BLOOD PRESSURE: 76 MMHG | WEIGHT: 168.2 LBS | SYSTOLIC BLOOD PRESSURE: 132 MMHG

## 2025-02-20 DIAGNOSIS — I10 ESSENTIAL HYPERTENSION: ICD-10-CM

## 2025-02-20 DIAGNOSIS — F17.200 TOBACCO DEPENDENCE: ICD-10-CM

## 2025-02-20 DIAGNOSIS — I25.10 CAD IN NATIVE ARTERY: Chronic | ICD-10-CM

## 2025-02-20 DIAGNOSIS — K21.9 GASTROESOPHAGEAL REFLUX DISEASE, UNSPECIFIED WHETHER ESOPHAGITIS PRESENT: ICD-10-CM

## 2025-02-20 DIAGNOSIS — J44.1 COPD EXACERBATION (HCC): Primary | ICD-10-CM

## 2025-02-20 PROCEDURE — 3023F SPIROM DOC REV: CPT | Performed by: STUDENT IN AN ORGANIZED HEALTH CARE EDUCATION/TRAINING PROGRAM

## 2025-02-20 PROCEDURE — 3075F SYST BP GE 130 - 139MM HG: CPT | Performed by: STUDENT IN AN ORGANIZED HEALTH CARE EDUCATION/TRAINING PROGRAM

## 2025-02-20 PROCEDURE — G8417 CALC BMI ABV UP PARAM F/U: HCPCS | Performed by: STUDENT IN AN ORGANIZED HEALTH CARE EDUCATION/TRAINING PROGRAM

## 2025-02-20 PROCEDURE — 3078F DIAST BP <80 MM HG: CPT | Performed by: STUDENT IN AN ORGANIZED HEALTH CARE EDUCATION/TRAINING PROGRAM

## 2025-02-20 PROCEDURE — 99214 OFFICE O/P EST MOD 30 MIN: CPT | Performed by: STUDENT IN AN ORGANIZED HEALTH CARE EDUCATION/TRAINING PROGRAM

## 2025-02-20 PROCEDURE — 3017F COLORECTAL CA SCREEN DOC REV: CPT | Performed by: STUDENT IN AN ORGANIZED HEALTH CARE EDUCATION/TRAINING PROGRAM

## 2025-02-20 PROCEDURE — 4004F PT TOBACCO SCREEN RCVD TLK: CPT | Performed by: STUDENT IN AN ORGANIZED HEALTH CARE EDUCATION/TRAINING PROGRAM

## 2025-02-20 PROCEDURE — G8427 DOCREV CUR MEDS BY ELIG CLIN: HCPCS | Performed by: STUDENT IN AN ORGANIZED HEALTH CARE EDUCATION/TRAINING PROGRAM

## 2025-02-20 RX ORDER — VARENICLINE TARTRATE 0.5 MG/1
.5-1 TABLET, FILM COATED ORAL SEE ADMIN INSTRUCTIONS
Qty: 57 TABLET | Refills: 0 | Status: SHIPPED | OUTPATIENT
Start: 2025-02-20

## 2025-02-20 RX ORDER — AZITHROMYCIN 250 MG/1
TABLET, FILM COATED ORAL
Qty: 6 TABLET | Refills: 0 | Status: SHIPPED | OUTPATIENT
Start: 2025-02-20 | End: 2025-03-02

## 2025-02-20 RX ORDER — OMEPRAZOLE 40 MG/1
40 CAPSULE, DELAYED RELEASE ORAL DAILY
Qty: 90 CAPSULE | Refills: 3 | Status: SHIPPED | OUTPATIENT
Start: 2025-02-20

## 2025-02-20 RX ORDER — METOPROLOL SUCCINATE 50 MG/1
50 TABLET, EXTENDED RELEASE ORAL DAILY
Qty: 90 TABLET | Refills: 0 | Status: SHIPPED | OUTPATIENT
Start: 2025-02-20

## 2025-02-20 RX ORDER — METHYLPREDNISOLONE 4 MG/1
TABLET ORAL
Qty: 1 KIT | Refills: 0 | Status: SHIPPED | OUTPATIENT
Start: 2025-02-20 | End: 2025-02-26

## 2025-02-20 RX ORDER — BENZONATATE 100 MG/1
100 CAPSULE ORAL 3 TIMES DAILY PRN
Qty: 30 CAPSULE | Refills: 0 | Status: SHIPPED | OUTPATIENT
Start: 2025-02-20 | End: 2025-03-02

## 2025-02-20 SDOH — ECONOMIC STABILITY: FOOD INSECURITY: WITHIN THE PAST 12 MONTHS, THE FOOD YOU BOUGHT JUST DIDN'T LAST AND YOU DIDN'T HAVE MONEY TO GET MORE.: NEVER TRUE

## 2025-02-20 SDOH — ECONOMIC STABILITY: FOOD INSECURITY: WITHIN THE PAST 12 MONTHS, YOU WORRIED THAT YOUR FOOD WOULD RUN OUT BEFORE YOU GOT MONEY TO BUY MORE.: NEVER TRUE

## 2025-02-20 ASSESSMENT — ENCOUNTER SYMPTOMS
ABDOMINAL PAIN: 0
WHEEZING: 1
SHORTNESS OF BREATH: 1
COUGH: 1
NAUSEA: 0
BACK PAIN: 1

## 2025-02-20 ASSESSMENT — PATIENT HEALTH QUESTIONNAIRE - PHQ9
7. TROUBLE CONCENTRATING ON THINGS, SUCH AS READING THE NEWSPAPER OR WATCHING TELEVISION: NOT AT ALL
10. IF YOU CHECKED OFF ANY PROBLEMS, HOW DIFFICULT HAVE THESE PROBLEMS MADE IT FOR YOU TO DO YOUR WORK, TAKE CARE OF THINGS AT HOME, OR GET ALONG WITH OTHER PEOPLE: NOT DIFFICULT AT ALL
SUM OF ALL RESPONSES TO PHQ QUESTIONS 1-9: 0
1. LITTLE INTEREST OR PLEASURE IN DOING THINGS: NOT AT ALL
SUM OF ALL RESPONSES TO PHQ QUESTIONS 1-9: 0
4. FEELING TIRED OR HAVING LITTLE ENERGY: NOT AT ALL
6. FEELING BAD ABOUT YOURSELF - OR THAT YOU ARE A FAILURE OR HAVE LET YOURSELF OR YOUR FAMILY DOWN: NOT AT ALL
SUM OF ALL RESPONSES TO PHQ9 QUESTIONS 1 & 2: 0
5. POOR APPETITE OR OVEREATING: NOT AT ALL
3. TROUBLE FALLING OR STAYING ASLEEP: NOT AT ALL
2. FEELING DOWN, DEPRESSED OR HOPELESS: NOT AT ALL
9. THOUGHTS THAT YOU WOULD BE BETTER OFF DEAD, OR OF HURTING YOURSELF: NOT AT ALL
8. MOVING OR SPEAKING SO SLOWLY THAT OTHER PEOPLE COULD HAVE NOTICED. OR THE OPPOSITE, BEING SO FIGETY OR RESTLESS THAT YOU HAVE BEEN MOVING AROUND A LOT MORE THAN USUAL: NOT AT ALL

## 2025-02-20 NOTE — PROGRESS NOTES
Date    BUNIONECTOMY  great toe    CARDIAC CATHETERIZATION  09/24/2018    COLONOSCOPY  08/20/2012    Dr. Romo    FOOT DEBRIDEMENT  11/2016    Dr Salmon-right great toe/skin    FOOT SURGERY      HAMMER TOE SURGERY      HYSTERECTOMY, TOTAL ABDOMINAL (CERVIX REMOVED)  2001    elective due to abnormal cells    JOINT REPLACEMENT Right     Knee    KNEE SURGERY Bilateral     arthroscopy    NERVE BLOCK  07/16/2014    right sacroiliac joint #1    NERVE BLOCK Right 11/03/2014    SI injection #2    NERVE BLOCK Right 11/19/2014    si inj #3    NERVE BLOCK Bilateral 04/22/2015    greater occipital nerve block  #1    NERVE BLOCK Bilateral 05/06/2015    greater occipital nerve block #2    NERVE BLOCK Bilateral 05/13/2015    greater occipital nerve block #3    NERVE BLOCK Right 06/10/2015    cervical transforaminal #1    NERVE BLOCK      Pain Management    NERVE BLOCK Right 01/18/2016    right hip injection  #1    NERVE BLOCK Right 01/25/2016    hip injection #2    NERVE BLOCK  02/17/2015    bilateral occipital     NERVE BLOCK Left 08/15/2016    genicular #1    NERVE BLOCK  09/07/2016    left genicular nerve block #2    NERVE BLOCK Right 12/07/2016    hip injection #2    NERVE BLOCK Right 04/19/2017    hip #1    NERVE BLOCK Right 09/11/2017    hip #2    OTHER SURGICAL HISTORY Right 02/09/2015    lumbar radiofrequency    OTHER SURGICAL HISTORY Right 03/09/2016    lumbar radiofrequency    OTHER SURGICAL HISTORY Right 11/14/2016    right hip injection #1    OTHER SURGICAL HISTORY Left 12/19/2016    genicular radiofrequency    ROTATOR CUFF REPAIR  1999 at 39 years    Dr Contreras    SHOULDER SURGERY Right     TONSILLECTOMY      TONSILLECTOMY      TOTAL KNEE ARTHROPLASTY Left 7/22/14 at 53 years old    Dr. Eugene Bedolla    UPPER GASTROINTESTINAL ENDOSCOPY  08/20/2012     BREAST FINE NEEDLE ASPIRATION  2018       Family History:      Problem Relation Age of Onset    Arthritis Mother     High Blood Pressure Mother     Rheum Arthritis 
Acceptable eye movement/Lids with acceptable appearance and movement/Conjunctiva clear/Iris acceptable shape and color/Cornea clear/Pupils equally round and react to light/Pupil red reflexes present and equal

## 2025-03-09 DIAGNOSIS — F41.9 ANXIETY: ICD-10-CM

## 2025-03-10 RX ORDER — HYDROXYZINE PAMOATE 25 MG/1
CAPSULE ORAL
Qty: 90 CAPSULE | Refills: 0 | Status: SHIPPED | OUTPATIENT
Start: 2025-03-10

## 2025-03-10 NOTE — TELEPHONE ENCOUNTER
Name of Medication(s) Requested:  Requested Prescriptions     Pending Prescriptions Disp Refills    hydrOXYzine pamoate (VISTARIL) 25 MG capsule [Pharmacy Med Name: HYDROXYZINE PAMOATE 25MG CAPSULES] 90 capsule 0     Sig: TAKE 1 CAPSULE BY MOUTH THREE TIMES DAILY AS NEEDED FOR ITCHING       Medication is on current medication list Yes    Dosage and directions were verified? Yes    Quantity verified: 30 day supply     Pharmacy Verified?  Yes    Last Appointment:  2/20/2025    Future appts:  Future Appointments   Date Time Provider Department Center   4/3/2025 10:30 AM Clifton Berg DO [facility]   8/11/2025  1:00 PM Sy Douglas APRN - CNS BDM ENDO [facility]   2/9/2026 11:45 AM Juve Mccullough MD [place] [facility]   11/4/2026  1:00 PM HAL JIMENEZ VAS US 1 SEYZ CARDIO [facility] Rad/Car   11/4/2026  1:30 PM Aretha Yin MD VASC/MED [facility]        (If no appt send self scheduling link. .REFILLAPPT)  Scheduling request sent?     [] Yes  [] No    Does patient need updated?  [] Yes  [] No

## 2025-04-16 DIAGNOSIS — F41.9 ANXIETY: ICD-10-CM

## 2025-04-17 RX ORDER — HYDROXYZINE PAMOATE 25 MG/1
CAPSULE ORAL
Qty: 90 CAPSULE | Refills: 0 | Status: SHIPPED | OUTPATIENT
Start: 2025-04-17

## 2025-04-23 DIAGNOSIS — I10 ESSENTIAL HYPERTENSION: ICD-10-CM

## 2025-04-23 DIAGNOSIS — I25.10 CAD IN NATIVE ARTERY: Chronic | ICD-10-CM

## 2025-04-23 RX ORDER — METOPROLOL SUCCINATE 25 MG/1
25 TABLET, EXTENDED RELEASE ORAL DAILY
Qty: 90 TABLET | Refills: 0 | OUTPATIENT
Start: 2025-04-23

## 2025-04-23 RX ORDER — CHLORTHALIDONE 25 MG/1
25 TABLET ORAL DAILY
Qty: 90 TABLET | Refills: 0 | OUTPATIENT
Start: 2025-04-23

## 2025-04-23 RX ORDER — LOSARTAN POTASSIUM 100 MG/1
100 TABLET ORAL DAILY
Qty: 90 TABLET | OUTPATIENT
Start: 2025-04-23

## 2025-04-24 DIAGNOSIS — I25.10 CAD IN NATIVE ARTERY: Chronic | ICD-10-CM

## 2025-04-24 DIAGNOSIS — I10 ESSENTIAL HYPERTENSION: ICD-10-CM

## 2025-04-24 NOTE — TELEPHONE ENCOUNTER
Name of Medication(s) Requested:  Requested Prescriptions     Pending Prescriptions Disp Refills    losartan (COZAAR) 100 MG tablet 30 tablet 3     Sig: Take 1 tablet by mouth daily    metoprolol succinate (TOPROL XL) 50 MG extended release tablet 90 tablet 0     Sig: Take 1 tablet by mouth daily    chlorthalidone (HYGROTON) 25 MG tablet 90 tablet 0     Sig: Take 1 tablet by mouth daily       Medication is on current medication list Yes    Dosage and directions were verified? Yes    Quantity verified: 90 day supply     Pharmacy Verified?  YesName of Medication(s) Requested:  Requested Prescriptions     Pending Prescriptions Disp Refills    losartan (COZAAR) 100 MG tablet 30 tablet 3     Sig: Take 1 tablet by mouth daily    metoprolol succinate (TOPROL XL) 50 MG extended release tablet 90 tablet 0     Sig: Take 1 tablet by mouth daily    chlorthalidone (HYGROTON) 25 MG tablet 90 tablet 0     Sig: Take 1 tablet by mouth daily       Medication is on current medication list Yes    Dosage and directions were verified? Yes    Quantity verified: 90 day supply     Pharmacy Verified?  Yes    Last Appointment:  2/20/2025    Future appts:  Future Appointments   Date Time Provider Department Center   7/16/2025 10:30 AM Clifton Berg DO [facility] DEP   8/11/2025  1:00 PM Sy Douglas APRN - CNS BDM ENDO [facility]   2/9/2026 11:45 AM Juve Mccullough MD [facility]   11/4/2026  1:00 PM HAL BLACKMON  1 SEYZ CARDIO SE Rad/Car   11/4/2026  1:30 PM Aretha Yin MD [facility]/MED [facility]        (If no appt send self scheduling link. .REFILLAPPT)  Scheduling request sent?     [] Yes  [] No    Does patient need updated?  [] Yes  [] No    Last Appointment:  2/20/2025    Future appts:  Future Appointments   Date Time Provider Department Center   7/16/2025 10:30 AM Clifton Berg DO [facility] DEP   8/11/2025  1:00 PM Sy Douglas APRN - CNS BDM ENDO [facility]   2/9/2026 11:45 AM Juve Mccullough

## 2025-04-28 RX ORDER — METOPROLOL SUCCINATE 50 MG/1
50 TABLET, EXTENDED RELEASE ORAL DAILY
Qty: 90 TABLET | Refills: 0 | Status: SHIPPED | OUTPATIENT
Start: 2025-04-28

## 2025-04-28 RX ORDER — LOSARTAN POTASSIUM 100 MG/1
100 TABLET ORAL DAILY
Qty: 30 TABLET | Refills: 3 | Status: SHIPPED | OUTPATIENT
Start: 2025-04-28

## 2025-04-28 RX ORDER — CHLORTHALIDONE 25 MG/1
25 TABLET ORAL DAILY
Qty: 90 TABLET | Refills: 0 | Status: SHIPPED | OUTPATIENT
Start: 2025-04-28

## 2025-05-19 RX ORDER — ROSUVASTATIN CALCIUM 40 MG/1
40 TABLET, COATED ORAL DAILY
Qty: 30 TABLET | Refills: 3 | Status: SHIPPED | OUTPATIENT
Start: 2025-05-19

## 2025-07-09 ENCOUNTER — OFFICE VISIT (OUTPATIENT)
Dept: ENDOCRINOLOGY | Age: 65
End: 2025-07-09
Payer: MEDICARE

## 2025-07-09 VITALS
OXYGEN SATURATION: 95 % | TEMPERATURE: 98.1 F | DIASTOLIC BLOOD PRESSURE: 69 MMHG | BODY MASS INDEX: 36.05 KG/M2 | SYSTOLIC BLOOD PRESSURE: 110 MMHG | WEIGHT: 166.6 LBS | HEART RATE: 61 BPM

## 2025-07-09 DIAGNOSIS — E27.9 ADRENAL NODULE: Primary | ICD-10-CM

## 2025-07-09 DIAGNOSIS — R61 EXCESSIVE SWEATING: ICD-10-CM

## 2025-07-09 PROCEDURE — G8417 CALC BMI ABV UP PARAM F/U: HCPCS | Performed by: CLINICAL NURSE SPECIALIST

## 2025-07-09 PROCEDURE — 4004F PT TOBACCO SCREEN RCVD TLK: CPT | Performed by: CLINICAL NURSE SPECIALIST

## 2025-07-09 PROCEDURE — 3074F SYST BP LT 130 MM HG: CPT | Performed by: CLINICAL NURSE SPECIALIST

## 2025-07-09 PROCEDURE — 3078F DIAST BP <80 MM HG: CPT | Performed by: CLINICAL NURSE SPECIALIST

## 2025-07-09 PROCEDURE — 3017F COLORECTAL CA SCREEN DOC REV: CPT | Performed by: CLINICAL NURSE SPECIALIST

## 2025-07-09 PROCEDURE — G8427 DOCREV CUR MEDS BY ELIG CLIN: HCPCS | Performed by: CLINICAL NURSE SPECIALIST

## 2025-07-09 PROCEDURE — 99205 OFFICE O/P NEW HI 60 MIN: CPT | Performed by: CLINICAL NURSE SPECIALIST

## 2025-07-09 RX ORDER — DEXAMETHASONE 1 MG
1 TABLET ORAL ONCE
Qty: 1 TABLET | Refills: 0 | Status: SHIPPED | OUTPATIENT
Start: 2025-07-09 | End: 2025-07-09

## 2025-07-09 RX ORDER — PREGABALIN 75 MG/1
CAPSULE ORAL
COMMUNITY
Start: 2025-06-27

## 2025-07-09 NOTE — PROGRESS NOTES
[facility]  [facility] Department of Endocrinology Diabetes and Metabolism   [address], [place], [place], [address]  Phone: [phone]  Fax: [phone]    Date of Service: 7/9/2025    Primary Care Physician: Clifton Berg DO  Referring physician: Alonso Laurent MD  Provider: MIKALA Renteria - CNS     Reason for the visit:  Adrenal nodule      History of Present Illness:  The history is provided by the patient. No  was used. Accuracy of the patient data is excellent.  Tiffany Ivan is a very pleasant 64 y.o. female seen today for diabetes management     First diagnosed with adrenal nodule:  Dx in 10/2024   Context: Dx after vaginal bleeding and CT was done   Imaging: (10/24) Recent CT of the abdomen with contrast showed 2.2 cm right adrenal nodule.      No biochemical testing was done     Patient complaining of excessive sweating and hot flashes    Patient does have hypertension and is currently on losartan    Denies hypokalemia          PAST MEDICAL HISTORY   Past Medical History:   Diagnosis Date    AAA (abdominal aortic aneurysm)     Anxiety and depression 09/11/2013    Baker's cyst, ruptured     CAD (coronary artery disease)     Chronic back pain     ankylosing spondylitis , RA    Chronic myocarditis (HCC) 12/01/2016    Diverticulosis of sigmoid colon     Duodenal ulcer     Facet arthropathy     Fibrocystic breast changes     Fracture of metatarsal bone     RIGHT FOOT    Genital warts     GERD (gastroesophageal reflux disease)     Heart attack (HCC) 09/15/2018    History of blood transfusion     loss of blood during pregnancy    History of cervical dysplasia 12/16/2013    Hyperlipidemia     Hypertension     IGT (impaired glucose tolerance) 11/11/2015    Myocarditis ([facility])     Dr. Dawson    Obesity     OLIVIA (obstructive sleep apnea)     CPAP    Osteoarthritis of multiple joints 08/13/2013    Osteomyelitis ([facility])     Primary insomnia 11/11/2015

## 2025-07-11 ENCOUNTER — TELEPHONE (OUTPATIENT)
Dept: ENDOCRINOLOGY | Age: 65
End: 2025-07-11

## 2025-07-11 NOTE — TELEPHONE ENCOUNTER
Pt was seen in the office for a new pt consult for an adrenal adenoma. We need to stop her losartan for 4 weeks to get the proper labs to check aldosterone and renin levels. Is this ok to do? If she requires a replacement we are asking not to put her on any ACE/ARB. Please advise.

## 2025-07-16 ENCOUNTER — OFFICE VISIT (OUTPATIENT)
Dept: FAMILY MEDICINE CLINIC | Age: 65
End: 2025-07-16
Payer: MEDICARE

## 2025-07-16 VITALS
HEIGHT: 57 IN | HEART RATE: 79 BPM | BODY MASS INDEX: 35.12 KG/M2 | TEMPERATURE: 97.5 F | DIASTOLIC BLOOD PRESSURE: 82 MMHG | OXYGEN SATURATION: 98 % | SYSTOLIC BLOOD PRESSURE: 114 MMHG | WEIGHT: 162.8 LBS

## 2025-07-16 DIAGNOSIS — E55.9 VITAMIN D DEFICIENCY: ICD-10-CM

## 2025-07-16 DIAGNOSIS — R05.3 CHRONIC COUGH: ICD-10-CM

## 2025-07-16 DIAGNOSIS — Z00.00 MEDICARE ANNUAL WELLNESS VISIT, SUBSEQUENT: Primary | ICD-10-CM

## 2025-07-16 DIAGNOSIS — E78.2 MIXED HYPERLIPIDEMIA: Chronic | ICD-10-CM

## 2025-07-16 DIAGNOSIS — R73.9 HYPERGLYCEMIA: ICD-10-CM

## 2025-07-16 DIAGNOSIS — I10 ESSENTIAL HYPERTENSION: ICD-10-CM

## 2025-07-16 DIAGNOSIS — Z87.891 PERSONAL HISTORY OF TOBACCO USE: ICD-10-CM

## 2025-07-16 PROCEDURE — G0296 VISIT TO DETERM LDCT ELIG: HCPCS | Performed by: STUDENT IN AN ORGANIZED HEALTH CARE EDUCATION/TRAINING PROGRAM

## 2025-07-16 PROCEDURE — 3074F SYST BP LT 130 MM HG: CPT | Performed by: STUDENT IN AN ORGANIZED HEALTH CARE EDUCATION/TRAINING PROGRAM

## 2025-07-16 PROCEDURE — 3079F DIAST BP 80-89 MM HG: CPT | Performed by: STUDENT IN AN ORGANIZED HEALTH CARE EDUCATION/TRAINING PROGRAM

## 2025-07-16 PROCEDURE — 3017F COLORECTAL CA SCREEN DOC REV: CPT | Performed by: STUDENT IN AN ORGANIZED HEALTH CARE EDUCATION/TRAINING PROGRAM

## 2025-07-16 PROCEDURE — G0439 PPPS, SUBSEQ VISIT: HCPCS | Performed by: STUDENT IN AN ORGANIZED HEALTH CARE EDUCATION/TRAINING PROGRAM

## 2025-07-16 RX ORDER — DEXAMETHASONE 1 MG
TABLET ORAL
COMMUNITY
Start: 2025-07-09

## 2025-07-16 ASSESSMENT — LIFESTYLE VARIABLES
HOW OFTEN DURING THE LAST YEAR HAVE YOU BEEN UNABLE TO REMEMBER WHAT HAPPENED THE NIGHT BEFORE BECAUSE YOU HAD BEEN DRINKING: NEVER
HOW OFTEN DURING THE LAST YEAR HAVE YOU FOUND THAT YOU WERE NOT ABLE TO STOP DRINKING ONCE YOU HAD STARTED: NEVER
HOW OFTEN DURING THE LAST YEAR HAVE YOU HAD A FEELING OF GUILT OR REMORSE AFTER DRINKING: NEVER
HOW OFTEN DO YOU HAVE A DRINK CONTAINING ALCOHOL: 4 OR MORE TIMES A WEEK
HAVE YOU OR SOMEONE ELSE BEEN INJURED AS A RESULT OF YOUR DRINKING: NO
HAS A RELATIVE, FRIEND, DOCTOR, OR ANOTHER HEALTH PROFESSIONAL EXPRESSED CONCERN ABOUT YOUR DRINKING OR SUGGESTED YOU CUT DOWN: NO
HOW OFTEN DURING THE LAST YEAR HAVE YOU FAILED TO DO WHAT WAS NORMALLY EXPECTED FROM YOU BECAUSE OF DRINKING: NEVER
HOW OFTEN DURING THE LAST YEAR HAVE YOU NEEDED AN ALCOHOLIC DRINK FIRST THING IN THE MORNING TO GET YOURSELF GOING AFTER A NIGHT OF HEAVY DRINKING: NEVER
HOW MANY STANDARD DRINKS CONTAINING ALCOHOL DO YOU HAVE ON A TYPICAL DAY: 1 OR 2

## 2025-07-16 ASSESSMENT — PATIENT HEALTH QUESTIONNAIRE - PHQ9
SUM OF ALL RESPONSES TO PHQ QUESTIONS 1-9: 1
9. THOUGHTS THAT YOU WOULD BE BETTER OFF DEAD, OR OF HURTING YOURSELF: NOT AT ALL
10. IF YOU CHECKED OFF ANY PROBLEMS, HOW DIFFICULT HAVE THESE PROBLEMS MADE IT FOR YOU TO DO YOUR WORK, TAKE CARE OF THINGS AT HOME, OR GET ALONG WITH OTHER PEOPLE: NOT DIFFICULT AT ALL
7. TROUBLE CONCENTRATING ON THINGS, SUCH AS READING THE NEWSPAPER OR WATCHING TELEVISION: NOT AT ALL
1. LITTLE INTEREST OR PLEASURE IN DOING THINGS: NOT AT ALL
3. TROUBLE FALLING OR STAYING ASLEEP: NOT AT ALL
6. FEELING BAD ABOUT YOURSELF - OR THAT YOU ARE A FAILURE OR HAVE LET YOURSELF OR YOUR FAMILY DOWN: NOT AT ALL
8. MOVING OR SPEAKING SO SLOWLY THAT OTHER PEOPLE COULD HAVE NOTICED. OR THE OPPOSITE, BEING SO FIGETY OR RESTLESS THAT YOU HAVE BEEN MOVING AROUND A LOT MORE THAN USUAL: NOT AT ALL
5. POOR APPETITE OR OVEREATING: NOT AT ALL
2. FEELING DOWN, DEPRESSED OR HOPELESS: NOT AT ALL
4. FEELING TIRED OR HAVING LITTLE ENERGY: SEVERAL DAYS
SUM OF ALL RESPONSES TO PHQ QUESTIONS 1-9: 1

## 2025-07-16 NOTE — PROGRESS NOTES
Appearance: Normal appearance.   HENT:      Right Ear: Tympanic membrane, ear canal and external ear normal.      Left Ear: Tympanic membrane, ear canal and external ear normal.   Cardiovascular:      Rate and Rhythm: Normal rate and regular rhythm.      Heart sounds: Normal heart sounds. No murmur heard.     No gallop.   Pulmonary:      Effort: Pulmonary effort is normal.      Breath sounds: Wheezing (faint, diffuse bilat, chronic) present. No rhonchi.   Abdominal:      General: Bowel sounds are normal. There is no distension.      Palpations: Abdomen is soft.      Tenderness: There is no abdominal tenderness.   Musculoskeletal:      Cervical back: No tenderness.      Right lower leg: No edema.      Left lower leg: No edema.   Lymphadenopathy:      Cervical: No cervical adenopathy.   Neurological:      Mental Status: She is alert.               No Known Allergies  Prior to Visit Medications    Medication Sig Taking? Authorizing Provider   dexAMETHasone (DECADRON) 1 MG tablet  Yes Provider, MD Simba   tiotropium (SPIRIVA RESPIMAT) 2.5 MCG/ACT AERS inhaler Inhale 2 puffs into the lungs daily Yes Clifton Berg DO   rosuvastatin (CRESTOR) 40 MG tablet TAKE 1 TABLET BY MOUTH DAILY Yes Chris Frye PA   losartan (COZAAR) 100 MG tablet Take 1 tablet by mouth daily Yes Chris Frye PA   metoprolol succinate (TOPROL XL) 50 MG extended release tablet Take 1 tablet by mouth daily Yes Chris Frye PA   chlorthalidone (HYGROTON) 25 MG tablet Take 1 tablet by mouth daily Yes Chris Frye PA   hydrOXYzine pamoate (VISTARIL) 25 MG capsule TAKE 1 CAPSULE BY MOUTH THREE TIMES DAILY AS NEEDED FOR ITCHING Yes Chris Frye PA   omeprazole (PRILOSEC) 40 MG delayed release capsule Take 1 capsule by mouth Daily Yes Clifton Berg DO   varenicline (CHANTIX) 0.5 MG tablet Take 1-2 tablets by mouth See Admin Instructions 0.5mg DAILY for 3 days followed by 0.5mg TWICE DAILY for 4 days followed

## 2025-07-17 ENCOUNTER — RESULTS FOLLOW-UP (OUTPATIENT)
Dept: FAMILY MEDICINE CLINIC | Age: 65
End: 2025-07-17

## 2025-07-17 ENCOUNTER — HOSPITAL ENCOUNTER (OUTPATIENT)
Age: 65
Discharge: HOME | End: 2025-07-17
Payer: MEDICARE

## 2025-07-17 DIAGNOSIS — E27.9 ADRENAL NODULE: ICD-10-CM

## 2025-07-17 DIAGNOSIS — R61 EXCESSIVE SWEATING: ICD-10-CM

## 2025-07-17 DIAGNOSIS — R73.9 HYPERGLYCEMIA: ICD-10-CM

## 2025-07-17 DIAGNOSIS — I10 ESSENTIAL HYPERTENSION: ICD-10-CM

## 2025-07-17 DIAGNOSIS — E78.2 MIXED HYPERLIPIDEMIA: Chronic | ICD-10-CM

## 2025-07-17 DIAGNOSIS — E55.9 VITAMIN D DEFICIENCY: ICD-10-CM

## 2025-07-17 LAB
25(OH)D3 SERPL-MCNC: 39.2 NG/ML (ref 30–100)
ALBUMIN SERPL-MCNC: 4.1 G/DL (ref 3.5–5.2)
ALP SERPL-CCNC: 97 U/L (ref 35–104)
ALT SERPL-CCNC: 24 U/L (ref 0–35)
ANION GAP SERPL CALCULATED.3IONS-SCNC: 13 MMOL/L (ref 7–16)
AST SERPL-CCNC: 25 U/L (ref 0–35)
BASOPHILS # BLD: 0.13 K/UL (ref 0–0.2)
BASOPHILS NFR BLD: 1 % (ref 0–2)
BILIRUB SERPL-MCNC: 0.3 MG/DL (ref 0–1.2)
BUN SERPL-MCNC: 24 MG/DL (ref 8–23)
CALCIUM SERPL-MCNC: 9.9 MG/DL (ref 8.8–10.2)
CHLORIDE SERPL-SCNC: 100 MMOL/L (ref 98–107)
CHOLEST SERPL-MCNC: 189 MG/DL
CO2 SERPL-SCNC: 28 MMOL/L (ref 22–29)
CREAT SERPL-MCNC: 0.8 MG/DL (ref 0.5–1)
EOSINOPHIL # BLD: 0.25 K/UL (ref 0.05–0.5)
EOSINOPHILS RELATIVE PERCENT: 2 % (ref 0–6)
ERYTHROCYTE [DISTWIDTH] IN BLOOD BY AUTOMATED COUNT: 13.2 % (ref 11.5–15)
GFR, ESTIMATED: 79 ML/MIN/1.73M2
GLUCOSE SERPL-MCNC: 95 MG/DL (ref 74–99)
HBA1C MFR BLD: 6.1 % (ref 4–5.6)
HCT VFR BLD AUTO: 49.5 % (ref 34–48)
HDLC SERPL-MCNC: 49 MG/DL
HGB BLD-MCNC: 16.7 G/DL (ref 11.5–15.5)
IMM GRANULOCYTES # BLD AUTO: 0.04 K/UL (ref 0–0.58)
IMM GRANULOCYTES NFR BLD: 0 % (ref 0–5)
LDLC SERPL CALC-MCNC: 114 MG/DL
LYMPHOCYTES NFR BLD: 2.8 K/UL (ref 1.5–4)
LYMPHOCYTES RELATIVE PERCENT: 27 % (ref 20–42)
MCH RBC QN AUTO: 30.4 PG (ref 26–35)
MCHC RBC AUTO-ENTMCNC: 33.7 G/DL (ref 32–34.5)
MCV RBC AUTO: 90.2 FL (ref 80–99.9)
MONOCYTES NFR BLD: 0.86 K/UL (ref 0.1–0.95)
MONOCYTES NFR BLD: 8 % (ref 2–12)
NEUTROPHILS NFR BLD: 61 % (ref 43–80)
NEUTS SEG NFR BLD: 6.4 K/UL (ref 1.8–7.3)
PLATELET # BLD AUTO: 388 K/UL (ref 130–450)
PMV BLD AUTO: 10.5 FL (ref 7–12)
POTASSIUM SERPL-SCNC: 4.3 MMOL/L (ref 3.5–5.1)
PROT SERPL-MCNC: 7.5 G/DL (ref 6.4–8.3)
RBC # BLD AUTO: 5.49 M/UL (ref 3.5–5.5)
SODIUM SERPL-SCNC: 141 MMOL/L (ref 136–145)
T4 FREE SERPL-MCNC: 0.9 NG/DL (ref 0.9–1.7)
TRIGL SERPL-MCNC: 128 MG/DL
TSH SERPL DL<=0.05 MIU/L-ACNC: 2.71 UIU/ML (ref 0.27–4.2)
VLDLC SERPL CALC-MCNC: 26 MG/DL
WBC OTHER # BLD: 10.5 K/UL (ref 4.5–11.5)

## 2025-07-17 PROCEDURE — 82306 VITAMIN D 25 HYDROXY: CPT

## 2025-07-17 PROCEDURE — 85025 COMPLETE CBC W/AUTO DIFF WBC: CPT

## 2025-07-17 PROCEDURE — 80053 COMPREHEN METABOLIC PANEL: CPT

## 2025-07-17 PROCEDURE — 82088 ASSAY OF ALDOSTERONE: CPT

## 2025-07-17 PROCEDURE — 84443 ASSAY THYROID STIM HORMONE: CPT

## 2025-07-17 PROCEDURE — 84439 ASSAY OF FREE THYROXINE: CPT

## 2025-07-17 PROCEDURE — 80061 LIPID PANEL: CPT

## 2025-07-17 PROCEDURE — 83036 HEMOGLOBIN GLYCOSYLATED A1C: CPT

## 2025-07-17 PROCEDURE — 84244 ASSAY OF RENIN: CPT

## 2025-07-17 PROCEDURE — 36415 COLL VENOUS BLD VENIPUNCTURE: CPT

## 2025-07-17 PROCEDURE — 83835 ASSAY OF METANEPHRINES: CPT

## 2025-07-17 NOTE — RESULT ENCOUNTER NOTE
Labs stable overall. A1c remains in prediabetic range at 6.1. Hemoglobin is slightly elevated and this is likely from chronic smoking and we will continue to monitor.

## 2025-07-19 ENCOUNTER — HOSPITAL ENCOUNTER (OUTPATIENT)
Age: 65
Discharge: HOME | End: 2025-07-19
Payer: MEDICARE

## 2025-07-19 DIAGNOSIS — R61 EXCESSIVE SWEATING: ICD-10-CM

## 2025-07-19 DIAGNOSIS — E27.9 ADRENAL NODULE: ICD-10-CM

## 2025-07-19 LAB
CORTIS SERPL-MCNC: 2.5 UG/DL (ref 2.7–18.4)
CORTISOL COLLECTION INFO: ABNORMAL

## 2025-07-19 PROCEDURE — 36415 COLL VENOUS BLD VENIPUNCTURE: CPT

## 2025-07-19 PROCEDURE — 82533 TOTAL CORTISOL: CPT

## 2025-07-19 PROCEDURE — 83835 ASSAY OF METANEPHRINES: CPT

## 2025-07-19 PROCEDURE — 82384 ASSAY THREE CATECHOLAMINES: CPT

## 2025-07-19 PROCEDURE — 83497 ASSAY OF 5-HIAA: CPT

## 2025-07-20 LAB
ALDOST SERPL-MCNC: 27.6 NG/DL
ALDOSTERONE COMMENT: NORMAL

## 2025-07-21 DIAGNOSIS — I10 ESSENTIAL HYPERTENSION: Primary | ICD-10-CM

## 2025-07-21 LAB
ALDOST/RENIN PLAS-RTO: 0.6 RATIO (ref 0.1–3.7)
METANEPH/PLASMA INTERP: ABNORMAL
METANEPHRINE: 0.22 NMOL/L (ref 0–0.49)
NORMETANEPHRINE PLASMA: 1.25 NMOL/L (ref 0–0.89)
RENIN PLAS-MCNC: 46.8 PG/ML

## 2025-07-21 RX ORDER — AMLODIPINE BESYLATE 5 MG/1
5 TABLET ORAL DAILY
Qty: 90 TABLET | Refills: 0 | Status: SHIPPED | OUTPATIENT
Start: 2025-07-21

## 2025-07-24 LAB
CATECHOL/URINE INTERP: ABNORMAL
COLLECT DURATION TIME SPEC: 24 H
CREAT 24H UR-MCNC: 59 MG/DL
CREATININE URINE /24 HR: 1136 MG/D (ref 500–1400)
DOPAMINE 24 HOUR URINE: 187 UG/D (ref 71–485)
DOPAMINE, URINE, PER VOLUME: 97 UG/L
DOPAMINE, URINE, RATIO TO CREATININE: 164 UG/G CRT (ref 0–250)
EPINEPHRINE 24 HOUR URINE: 6 UG/D (ref 1–14)
EPINEPHRINE, URINE, PER VOLUME: 3 UG/L
EPINEPHRINE, URINE, RATIO TO CREATININE: 5 UG/G CRT (ref 0–20)
NOREPINEPHRINE 24 HOUR URINE: 92 UG/D (ref 14–120)
NOREPINEPHRINE CREAT RATIO: 81 UG/G CRT (ref 0–45)
NOREPINEPHRINE, URINE, PER VOLUME: 48 UG/L
SPECIMEN VOL ?TM UR: 1925 ML

## 2025-07-25 LAB
5 HIAA URINE (PER GM CREAT): 5 MG/GCR (ref 0–14)
5-HIAA INTERPRETATION: NORMAL
5-HIAA URINE: 2.7 MG/L
5-HIAA, URINE: 5 MG/D (ref 0–15)
COLLECT DURATION TIME SPEC: 24 H
CREAT 24H UR-MCNC: 59 MG/DL
CREATININE URINE /24 HR: 1136 MG/D (ref 500–1400)
SPECIMEN VOL ?TM UR: 1925 ML

## 2025-07-26 LAB
COLLECT DURATION TIME SPEC: 24 H
CREAT 24H UR-MCNC: 59 MG/DL
CREATININE URINE /24 HR: 1136 MG/D (ref 500–1400)
METANEPHRINE UF INTERPRETATION: ABNORMAL
METANEPHRINE UG/G CRE: 120 UG/G CRT (ref 0–300)
METANEPHRINES 24 HOUR URINE: 137 UG/D (ref 36–229)
METANEPHRINES, URINE (UMOL/L): 71 UG/L
NORMETANEPHRINE, (G/CRT): 527 UG/G CRT (ref 0–400)
NORMETANEPHRINE, (NMOL/DAY): 599 UG/D (ref 95–650)
NORMETANEPHRINES, NMOL/L: 311 UG/L
SPECIMEN VOL ?TM UR: 1925 ML

## 2025-08-04 DIAGNOSIS — I10 ESSENTIAL HYPERTENSION: ICD-10-CM

## 2025-08-04 RX ORDER — CHLORTHALIDONE 25 MG/1
25 TABLET ORAL DAILY
Qty: 90 TABLET | Refills: 0 | Status: SHIPPED | OUTPATIENT
Start: 2025-08-04

## 2025-08-05 DIAGNOSIS — I25.10 CAD IN NATIVE ARTERY: Chronic | ICD-10-CM

## 2025-08-05 DIAGNOSIS — I10 ESSENTIAL HYPERTENSION: ICD-10-CM

## 2025-08-05 RX ORDER — METOPROLOL SUCCINATE 50 MG/1
50 TABLET, EXTENDED RELEASE ORAL DAILY
Qty: 90 TABLET | Refills: 0 | Status: SHIPPED | OUTPATIENT
Start: 2025-08-05

## 2025-08-15 ENCOUNTER — HOSPITAL ENCOUNTER (OUTPATIENT)
Dept: CT IMAGING | Age: 65
Discharge: HOME | End: 2025-08-17
Payer: MEDICARE

## 2025-08-15 ENCOUNTER — HOSPITAL ENCOUNTER (OUTPATIENT)
Dept: LAB | Age: 65
Discharge: HOME | End: 2025-08-15
Payer: MEDICARE

## 2025-08-15 DIAGNOSIS — E27.9 ADRENAL NODULE: ICD-10-CM

## 2025-08-15 LAB
ALBUMIN SERPL-MCNC: 3.9 G/DL (ref 3.5–5.2)
ALP SERPL-CCNC: 98 U/L (ref 35–104)
ALT SERPL-CCNC: 40 U/L (ref 0–35)
ANION GAP SERPL CALCULATED.3IONS-SCNC: 13 MMOL/L (ref 7–16)
AST SERPL-CCNC: 44 U/L (ref 0–35)
BILIRUB SERPL-MCNC: 0.2 MG/DL (ref 0–1.2)
BUN SERPL-MCNC: 11 MG/DL (ref 8–23)
CALCIUM SERPL-MCNC: 9.8 MG/DL (ref 8.8–10.2)
CHLORIDE SERPL-SCNC: 101 MMOL/L (ref 98–107)
CO2 SERPL-SCNC: 29 MMOL/L (ref 22–29)
CREAT SERPL-MCNC: 0.7 MG/DL (ref 0.5–1)
GFR, ESTIMATED: >90 ML/MIN/1.73M2
GLUCOSE SERPL-MCNC: 97 MG/DL (ref 74–99)
POTASSIUM SERPL-SCNC: 3.5 MMOL/L (ref 3.5–5.1)
PROT SERPL-MCNC: 7 G/DL (ref 6.4–8.3)
SODIUM SERPL-SCNC: 143 MMOL/L (ref 136–145)

## 2025-08-15 PROCEDURE — 74160 CT ABDOMEN W/CONTRAST: CPT

## 2025-08-15 PROCEDURE — 80053 COMPREHEN METABOLIC PANEL: CPT

## 2025-08-15 PROCEDURE — 82088 ASSAY OF ALDOSTERONE: CPT

## 2025-08-15 PROCEDURE — 6360000004 HC RX CONTRAST MEDICATION: Performed by: RADIOLOGY

## 2025-08-15 PROCEDURE — 36415 COLL VENOUS BLD VENIPUNCTURE: CPT

## 2025-08-15 PROCEDURE — 84244 ASSAY OF RENIN: CPT

## 2025-08-15 RX ORDER — IOPAMIDOL 755 MG/ML
75 INJECTION, SOLUTION INTRAVASCULAR
Status: COMPLETED | OUTPATIENT
Start: 2025-08-15 | End: 2025-08-15

## 2025-08-15 RX ADMIN — IOPAMIDOL 75 ML: 755 INJECTION, SOLUTION INTRAVENOUS at 11:46

## 2025-08-19 LAB — ALDOST SERPL-MCNC: 6.7 NG/DL

## 2025-08-20 LAB
ALDOST/RENIN PLAS-RTO: 0.6 RATIO (ref 0.1–3.7)
RENIN PLAS-MCNC: 10.9 PG/ML

## 2025-08-21 ENCOUNTER — HOSPITAL ENCOUNTER (OUTPATIENT)
Dept: LAB | Age: 65
Discharge: HOME | End: 2025-08-21

## 2025-08-21 DIAGNOSIS — E27.9 ADRENAL NODULE: Primary | ICD-10-CM

## 2025-08-21 DIAGNOSIS — R61 EXCESSIVE SWEATING: ICD-10-CM
